# Patient Record
Sex: MALE
[De-identification: names, ages, dates, MRNs, and addresses within clinical notes are randomized per-mention and may not be internally consistent; named-entity substitution may affect disease eponyms.]

---

## 2022-08-02 ENCOUNTER — HOSPITAL ENCOUNTER (EMERGENCY)
Dept: HOSPITAL 97 - ER | Age: 52
Discharge: HOME | End: 2022-08-02
Payer: SELF-PAY

## 2022-08-02 VITALS — OXYGEN SATURATION: 100 %

## 2022-08-02 VITALS — DIASTOLIC BLOOD PRESSURE: 74 MMHG | SYSTOLIC BLOOD PRESSURE: 138 MMHG

## 2022-08-02 VITALS — TEMPERATURE: 98.6 F

## 2022-08-02 DIAGNOSIS — K72.00: Primary | ICD-10-CM

## 2022-08-02 DIAGNOSIS — R79.1: ICD-10-CM

## 2022-08-02 DIAGNOSIS — K70.31: ICD-10-CM

## 2022-08-02 DIAGNOSIS — R60.9: ICD-10-CM

## 2022-08-02 DIAGNOSIS — Z20.822: ICD-10-CM

## 2022-08-02 LAB
ALBUMIN SERPL BCP-MCNC: 1.6 G/DL (ref 3.4–5)
ALP SERPL-CCNC: 90 U/L (ref 45–117)
ALT SERPL W P-5'-P-CCNC: 30 U/L (ref 12–78)
ANISOCYTOSIS BLD QL: (no result)
AST SERPL W P-5'-P-CCNC: 51 U/L (ref 15–37)
BLD SMEAR INTERP: (no result)
BUN BLD-MCNC: 5 MG/DL (ref 7–18)
GLUCOSE SERPLBLD-MCNC: 95 MG/DL (ref 74–106)
HCT VFR BLD CALC: 31 % (ref 39.6–49)
INR BLD: 2.69
LYMPHOCYTES # SPEC AUTO: 0.6 K/UL (ref 0.7–4.9)
MACROCYTES BLD QL: (no result)
MAGNESIUM SERPL-MCNC: 2 MG/DL (ref 1.8–2.4)
MCV RBC: 111 FL (ref 80–100)
MORPHOLOGY BLD-IMP: (no result)
NT-PROBNP SERPL-MCNC: 334 PG/ML (ref ?–125)
PMV BLD: 7.8 FL (ref 7.6–11.3)
POTASSIUM SERPL-SCNC: 3.6 MMOL/L (ref 3.5–5.1)
RBC # BLD: 2.8 M/UL (ref 4.33–5.43)
TROPONIN I SERPL HS-MCNC: 4.9 PG/ML (ref ?–58.9)

## 2022-08-02 PROCEDURE — 83735 ASSAY OF MAGNESIUM: CPT

## 2022-08-02 PROCEDURE — 93005 ELECTROCARDIOGRAM TRACING: CPT

## 2022-08-02 PROCEDURE — 83880 ASSAY OF NATRIURETIC PEPTIDE: CPT

## 2022-08-02 PROCEDURE — 84484 ASSAY OF TROPONIN QUANT: CPT

## 2022-08-02 PROCEDURE — 99284 EMERGENCY DEPT VISIT MOD MDM: CPT

## 2022-08-02 PROCEDURE — 85610 PROTHROMBIN TIME: CPT

## 2022-08-02 PROCEDURE — 96372 THER/PROPH/DIAG INJ SC/IM: CPT

## 2022-08-02 PROCEDURE — 85025 COMPLETE CBC W/AUTO DIFF WBC: CPT

## 2022-08-02 PROCEDURE — 80048 BASIC METABOLIC PNL TOTAL CA: CPT

## 2022-08-02 PROCEDURE — 71045 X-RAY EXAM CHEST 1 VIEW: CPT

## 2022-08-02 PROCEDURE — 86850 RBC ANTIBODY SCREEN: CPT

## 2022-08-02 PROCEDURE — 36415 COLL VENOUS BLD VENIPUNCTURE: CPT

## 2022-08-02 PROCEDURE — 96365 THER/PROPH/DIAG IV INF INIT: CPT

## 2022-08-02 PROCEDURE — 86901 BLOOD TYPING SEROLOGIC RH(D): CPT

## 2022-08-02 PROCEDURE — 96375 TX/PRO/DX INJ NEW DRUG ADDON: CPT

## 2022-08-02 PROCEDURE — 86900 BLOOD TYPING SEROLOGIC ABO: CPT

## 2022-08-02 PROCEDURE — 87811 SARS-COV-2 COVID19 W/OPTIC: CPT

## 2022-08-02 PROCEDURE — 82140 ASSAY OF AMMONIA: CPT

## 2022-08-02 PROCEDURE — 80076 HEPATIC FUNCTION PANEL: CPT

## 2022-08-02 NOTE — XMS REPORT
Continuity of Care Document

                            Created on:2022



Patient:MEHUL CESAR

Sex:Male

:1970

External Reference #:[id number]





Demographics







                          Address                   [address] 



                                                    [place] [address]

 

                          Home Phone                [phone]

 

                          Mobile Phone              [phone]

 

                          Email Address             [email or web address]

 

                          Preferred Language        English

 

                          Marital Status            Unknown

 

                          [employer]ist Affiliation     Unknown

 

                          Race                      Unknown

 

                          Additional Race(s)        Unavailable

 

                          Ethnic Group              Unknown









Author







                          Organization              [facility]

t

 

                          Address                   [address]



                                                    [place] [address]

 

                          Phone                     [phone]









Support







                Name            Relationship    Address         Phone

 

                MIGUEL ANGEL CESAR               [address]available



                                                [address]             



                                                [place] [address] 









Care Team Providers







                    Name                Role                Phone

 

                    OWENRADHAJERRODWILLIS PASCAL Primary Care Physician Unavailable

 

                    JONNA MAHMOOD    Attending Clinician Unavailable

 

                    MARGE HOPKINS   Attending Clinician Unavailable

 

                    ENA KNOX      Attending Clinician Unavailable

 

                    MELINDA MEDINA  Attending Clinician Unavailable

 

                    ESTRELLITA POSEY  Attending Clinician Unavailable

 

                    MACEY OLIVAREZ Attending Clinician Unavailable

 

                    JONNA MAHMOOD    Admitting Clinician Unavailable

 

                    MARGE HOPKINS   Admitting Clinician Unavailable

 

                    ENA KNOX      Admitting Clinician Unavailable

 

                    MACEY OLIVAREZ Admitting Clinician Unavailable









Payers







           Payer Name Policy Type Policy Number Effective Date Expiration Date LAURENCE HERNANDEZ OPEN ACCESS            [id number]  2013 



           O NAP                          00:00:00   00:00:00   

 

           [facility]            [id number] 2019            



                                            00:00:00              







Problems

This patient has no known problems.



Allergies, Adverse Reactions, Alerts







       Allergy Allergy Status Severity Reaction(s) Onset  Inactive Treating Comm

ents 

Source



       Name   Type                        Date   Date   Clinician        

 

       NO KNOWN Allergy Active                                           [facility]



       ALLERGIE                                                         



       S                                                              







Medications

This patient has no known medications.



Vital Signs







             Vital Name   Observation Time Observation Value Comments     Source

 

             HEIGHT       2021 15:18:00 172.7 cm                  

 

             WEIGHT       2021 15:18:00 81.194 kg                 

 

             HEIGHT       2021 15:18:00 172.7 cm                  

 

             WEIGHT       2021 15:18:00 81.194 kg                 

 

             HEIGHT       2021 11:00:00 172.7 cm                  

 

             WEIGHT       2021 11:00:00 81.194 kg                 

 

             HEIGHT       2021 11:00:00 172.7 cm                  

 

             WEIGHT       2021 11:00:00 81.194 kg                 







Procedures

This patient has no known procedures.



Encounters







        Start   End     Encounter Admission Attending Care    Care    Encounter 

Source



        Date/Time Date/Time Type    Type    Clinicians Facility Department ID   

   

 

        2021-10-09         Outpatient         TIMOTEO [facility]    Surgery [id number]

 [facility]



        01:00:26                         JONNA                          

 

        2021         Inpatient UR      NALAM  [facility]    Internal [id number]

 [facility]



        14:51:00                         MARGE           Med             

 

        2021 Outpatient HAYES MEDINA [facility]    [id number]

[id number] [facility]



        00:00:00 00:00:00                 RISE                            

 

        2021 Outpatient [facility]    [id number]

[id number] [facility]



        00:00:00 00:00:00                                                 

 

        2021 Outpatient       ABILIO ENA [facility]    Radiology 2

[id number] [facility]



        06:16:51 09:51:00                                                 

 

        2021 Outpatient [facility]    [id number]

[id number] [facility]



        00:00:00 00:00:00                                                 

 

        2021 Outpatient         [facility]    [id number]

023 [facility]



        00:00:00 00:00:00                 ESTRELLITA                          

 

        2021 Outpatient       KALPESH   [facility]    [id number]

022 [facility]



        00:00:00 00:00:00                 ESTRELLITA                          

 

        2021 Outpatient                 [facility]    [id number]

[id number] [facility]



        00:00:00 00:00:00                                                 

 

        2019-12-10 2019-12-10 Outpatient [facility]    [id number]

253 [facility]



        00:00:00 00:00:00                                                 







Results







           Test Description Test Time  Test Comments Results    Result Comments 

Source









                    HEPATIC FUNCTION PANEL 2022 01:12:10 









                      Test Item  Value      Reference Range Interpretation Comme

nts









             PROTEIN, TOTAL (test code = 7.9 G/DL     6.1-8.3                   



             )                                               

 

             ALBUMIN (test code = 2201) 2.0 G/DL     3.5-5.2      L            

 

             BILIRUBIN, TOTAL (test code = 11.3 MG/DL   See_Comment  H          

   [Automated message] The



             )                                               system which ge

nerated this



                                                                 result transmit

susi reference



                                                                 range: <=1.2. T

he reference



                                                                 range was not u

sed to



                                                                 interpret this 

result as



                                                                 normal/abnormal

.

 

             BILIRUBIN, DIRECT (test code = 3.6 MG/DL    0.0-0.3      H         

   



             )                                               

 

             ALKALINE PHOSPHATASE (test 101 U/L                          



             code = 2204)                                        

 

             AST (test code = 2218) 49 U/L       9-50                      

 

             ALT (test code = 2219) 25 U/L       5-50                      



CBC W/AUTO DIFF WITH [id number] 04:37:27





             Test Item    Value        Reference Range Interpretation Comments

 

             WBC (test code = 4.8 K/UL     3.5-11.0                  



             1001)                                               

 

             RBC (test code = 3.14 M/UL    4.50-6.10    L            



             1002)                                               

 

             HEMOGLOBIN (test 11.7 G/DL    13.5-17.0    L            



             code = 1003)                                        

 

             HEMATOCRIT (test 32.9 %       40.0-51.0    L            



             code = 1004)                                        

 

             MCV (test code = 104.8 fL     80.0-99.0    H            



             1005)                                               

 

             MCH (test code = 37.3 PG      25.0-33.0    H            



             1006)                                               

 

             MCHC (test code = 35.6 G/DL    31.0-36.0                 



             1007)                                               

 

             RDW (test code = 12.8 %       11.5-15.0                 



             1038)                                               

 

             NEUTROPHILS (test 58.4 %                                  AUTOMATED



             code = 1008)                                        DIFFERENTIAL



                                                                 CONFIRMED  WITH



                                                                 MANUAL SLIDE RE

VIEW.

 

             LYMPHOCYTES (test 18.6 %                                 



             code = 1010)                                        

 

             MONOCYTES (test code 15.9 %                                 



             = 1011)                                             

 

             EOSINOPHILS (test 5.0 %                                  



             code = 1012)                                        

 

             BASOPHILS (test code 1.3 %                                  



             = 1013)                                             

 

             IMMATURE     0.8 %                                  



             GRANULOCYTES (test                                        



             code = 1036)                                        

 

             NUCLEATED RBCS (test 0.0 /100     See_Comment                [Autom

ated message]



             code = 1065) WBC'S                                  The system whic

h



                                                                 generated this 

result



                                                                 transmitted ref

erence



                                                                 range: 0.0. The



                                                                 reference range

 was



                                                                 not used to int

erpret



                                                                 this result as



                                                                 normal/abnormal

.

 

             PLATELET COUNT (test 92 K/UL      130-400      L            



             code = 1015)                                        

 

             ABSOLUTE NEUTROPHILS 2.80 K/UL    1.50-7.50                 



             (test code = 1066)                                        

 

             ABSOLUTE LYMPHOCYTES 0.89 K/UL    1.00-4.00    L            



             (test code = 1067)                                        

 

             ABSOLUTE MONOCYTES 0.76 K/UL    0.20-1.00                 



             (test code = 1068)                                        

 

             ABSOLUTE EOSINOPHILS 0.24 K/UL    0.00-0.50                 



             (test code = 1040)                                        

 

             ABSOLUTE BASOPHILS 0.06 K/UL    0.00-0.20                 



             (test code = 1069)                                        

 

             ABS IMMATURE 0.04 K/UL    0.00-0.10                 



             GRANULOCYTES (test                                        



             code = 1020)                                        

 

             ABS NUCLEATED RBCS 0.02 K/UL    0.00-0.11                 



             (test code = 45344)                                        

 

             COMMENTS (test code (NOTE)                                  SLIGHT 

MACROCYTOSIS



             = 1016)                                             SEE ADDITIONAL



                                                                 COMMENTS BELOW:



                                                                 PLATELET CLUMPI

NG



                                                                 PRESENT; TRUE



                                                                 PLATELET COUNT 

MAY BE



                                                                 HIGHER. IF CLIN

ICALLY



                                                                 INDICATED, ORDE

R



                                                                 REPEAT PLATELET

 COUNT



                                                                 WITH CITRATE



                                                                 ANTICOAGULATED 

TUBE



                                                                 (CPL ORDER CODE



                                                                 1047). UNLESS



                                                                 OTHERWISE INDIC

ATED,



                                                                 ALL TESTING PER

FORMED



                                                                 ATCLINICAL PATH

[employer] (now [employer]), [facility].



                                                                 [address] [place], [place] [address] [place] [address]



                                                                 DIRECTOR: BETH MESA M.D.

 CLIA



                                                                 NUMBER [id number]

[id number] CAP



                                                                 ACCREDITATION N

O.



                                                                 [id number]



MISCELLANEOUS LAB [id number] 13:01:00





             Test Item    Value        Reference Range Interpretation Comments

 

             SCAN RESULT (test code = 8651034)                                  

      



BASIC METABOLIC [id number] 14:32:00





             Test Item    Value        Reference Range Interpretation Comments

 

             SODIUM (BEAKER) 138 meq/L    136-145                   



             (test code = 381)                                        

 

             POTASSIUM (BEAKER) 3.8 meq/L    3.5-5.1                   



             (test code = 379)                                        

 

             CHLORIDE (BEAKER) 107 meq/L                        



             (test code = 382)                                        

 

             CO2 (BEAKER) (test 24 meq/L     22-29                     



             code = 355)                                         

 

             BLOOD UREA NITROGEN 4 mg/dL      7-21         L            



             (BEAKER) (test code                                        



             = 354)                                              

 

             CREATININE (BEAKER) 0.83 mg/dL   0.57-1.25                 



             (test code = 358)                                        

 

             GLUCOSE RANDOM 60 mg/dL            L            



             (BEAKER) (test code                                        



             = 652)                                              

 

             CALCIUM (BEAKER) 8.6 mg/dL    8.4-10.2                  



             (test code = 697)                                        

 

             EGFR (BEAKER) (test 98 mL/min/1.73                           ESTIMA

SUSI GFR IS



             code = 1092) sq m                                   NOT AS ACCURATE

 AS



                                                                 CREATININE



                                                                 CLEARANCE IN



                                                                 PREDICTING



                                                                 GLOMERULAR



                                                                 FILTRATION RATE

.



                                                                 ESTIMATED GFR I

S



                                                                 NOT APPLICABLE 

FOR



                                                                 DIALYSIS PATIEN

TS.



 ID - RHONDA Pearceecimen moderately ictericHEPATIC FUNCTION [id number] 
14:32:00





             Test Item    Value        Reference Range Interpretation Comments

 

             TOTAL PROTEIN (BEAKER) (test code = 7.8 gm/dL    6.0-8.3           

        



             770)                                                

 

             ALBUMIN (BEAKER) (test code = 1145) 2.5 g/dL     3.5-5.0      L    

        

 

             BILIRUBIN TOTAL (BEAKER) (test code 5.8 mg/dL    0.2-1.2      H    

        



             = 377)                                              

 

             BILIRUBIN DIRECT (BEAKER) (test 2.5 mg/dL    0.1-0.5      H        

    



             code = 706)                                         

 

             ALKALINE PHOSPHATASE (BEAKER) (test 163 U/L             H    

        



             code = 346)                                         

 

             AST (SGOT) (BEAKER) (test code = 47 U/L       5-34         H       

     



             353)                                                

 

             ALT (SGPT) (BEAKER) (test code = 18 U/L       6-55                 

     



             347)                                                



 ID - RHONDA Mckeonimedolly moderately ictericPROTHROMBIN TIME/[id number] 
14:23:00





             Test Item    Value        Reference Range Interpretation Comments

 

             PROTIME (BEAKER) 21.9 seconds 11.9-14.2    H            



             (test code = 759)                                        

 

             INR (BEAKER) (test 1.97         See_Comment                [Automat

ed message]



             code = 370)                                         The system [employer]



                                                                 generated this 

result



                                                                 transmitted ref

erence



                                                                 range: <=5.90. 

The



                                                                 reference range

 was



                                                                 not used to int

erpret



                                                                 this result as



                                                                 normal/abnormal

.



Effective 2019: PT Reference Range ChangeNew: 11.9-14.2 Previous: 11.7-
14.7RECOMMENDED COUMADIN/WARFARIN INR THERAPY RANGESSTANDARD DOSE: 2.0-3.0 
Includes: PROPHYLAXIS for venous thrombosis, systemic embolization; TREATMENT 
for venous thrombosis and/or pulmonary embolus.HIGH RISK: Target INR is 2.5-3.5 
for patients wiht mechanical heart valves.CBC W/PLT COUNT &amp; AUTO 
[id number] 14:21:00





             Test Item    Value        Reference Range Interpretation Comments

 

             WHITE BLOOD CELL COUNT (BEAKER) 4.5 K/ L     3.5-10.5              

    



             (test code = 775)                                        

 

             RED BLOOD CELL COUNT (BEAKER) 3.70 M/ L    4.63-6.08    L          

  



             (test code = 761)                                        

 

             HEMOGLOBIN (BEAKER) (test code = 12.5 GM/DL   13.7-17.5    L       

     



             410)                                                

 

             HEMATOCRIT (BEAKER) (test code = 37.4 %       40.1-51.0    L       

     



             411)                                                

 

             MEAN CORPUSCULAR VOLUME (BEAKER) 101.1 fL     79.0-92.2    H       

     



             (test code = 753)                                        

 

             MEAN CORPUSCULAR HEMOGLOBIN 33.8 pg      25.7-32.2    H            



             (BEAKER) (test code = 751)                                        

 

             MEAN CORPUSCULAR HEMOGLOBIN CONC 33.4 GM/DL   32.3-36.5            

     



             (BEAKER) (test code = 752)                                        

 

             RED CELL DISTRIBUTION WIDTH 14.9 %       11.6-14.4    H            



             (BEAKER) (test code = 412)                                        

 

             PLATELET COUNT (BEAKER) (test 124 K/CU MM  150-450      L          

  



             code = 756)                                         

 

             MEAN PLATELET VOLUME (BEAKER) 11.8 fL      9.4-12.4                

  



             (test code = 754)                                        

 

             NUCLEATED RED BLOOD CELLS 0 /100 WBC   0-0                       



             (BEAKER) (test code = 413)                                        

 

             NEUTROPHILS RELATIVE PERCENT 43 %                                  

 



             (BEAKER) (test code = 429)                                        

 

             LYMPHOCYTES RELATIVE PERCENT 34 %                                  

 



             (BEAKER) (test code = 430)                                        

 

             MONOCYTES RELATIVE PERCENT 12 %                                   



             (BEAKER) (test code = 431)                                        

 

             EOSINOPHILS RELATIVE PERCENT 10 %                                  

 



             (BEAKER) (test code = 432)                                        

 

             BASOPHILS RELATIVE PERCENT 1 %                                    



             (BEAKER) (test code = 437)                                        

 

             NEUTROPHILS ABSOLUTE COUNT 1.92 K/ L    1.78-5.38                 



             (BEAKER) (test code = 670)                                        

 

             LYMPHOCYTES ABSOLUTE COUNT 1.55 K/ L    1.32-3.57                 



             (BEAKER) (test code = 414)                                        

 

             MONOCYTES ABSOLUTE COUNT (BEAKER) 0.55 K/ L    0.30-0.82           

      



             (test code = 415)                                        

 

             EOSINOPHILS ABSOLUTE COUNT 0.45 K/ L    0.04-0.54                 



             (BEAKER) (test code = 416)                                        

 

             BASOPHILS ABSOLUTE COUNT (BEAKER) 0.05 K/ L    0.01-0.08           

      



             (test code = 417)                                        

 

             IMMATURE GRANULOCYTES-RELATIVE 0 %          0-1                    

   



             PERCENT (BEAKER) (test code =                                      

  



             2801)                                               



MISCELLANEOUS LAB [id number] 10:46:00





             Test Item    Value        Reference Range Interpretation Comments

 

             SCAN RESULT (test code = 7945863)                                  

      



BASIC METABOLIC [id number] 12:16:00





             Test Item    Value        Reference Range Interpretation Comments

 

             SODIUM (BEAKER) 135 meq/L    136-145      L            



             (test code = 381)                                        

 

             POTASSIUM (BEAKER) 3.9 meq/L    3.5-5.1                   



             (test code = 379)                                        

 

             CHLORIDE (BEAKER) 107 meq/L                        



             (test code = 382)                                        

 

             CO2 (BEAKER) (test 25 meq/L     22-29                     



             code = 355)                                         

 

             BLOOD UREA NITROGEN 6 mg/dL      7-21         L            



             (BEAKER) (test code                                        



             = 354)                                              

 

             CREATININE (BEAKER) 0.77 mg/dL   0.57-1.25                 



             (test code = 358)                                        

 

             GLUCOSE RANDOM 83 mg/dL                         



             (BEAKER) (test code                                        



             = 652)                                              

 

             CALCIUM (BEAKER) 7.8 mg/dL    8.4-10.2     L            



             (test code = 697)                                        

 

             EGFR (BEAKER) (test 107 mL/min/1.73                           ESTIM

ATED GFR IS



             code = 1092) sq m                                   NOT AS ACCURATE

 AS



                                                                 CREATININE



                                                                 CLEARANCE IN



                                                                 PREDICTING



                                                                 GLOMERULAR



                                                                 FILTRATION RATE

.



                                                                 ESTIMATED GFR I

S



                                                                 NOT APPLICABLE 

FOR



                                                                 DIALYSIS PATIEN

TS.



 ID Dre TOPETE FSpecimen moderately ictericHEPATIC FUNCTION PANEL
2021 12:07:00





             Test Item    Value        Reference Range Interpretation Comments

 

             TOTAL PROTEIN (BEAKER) (test code = 6.6 gm/dL    6.0-8.3           

        



             770)                                                

 

             ALBUMIN (BEAKER) (test code = 1145) 2.3 g/dL     3.5-5.0      L    

        

 

             BILIRUBIN TOTAL (BEAKER) (test code 5.9 mg/dL    0.2-1.2      H    

        



             = 377)                                              

 

             BILIRUBIN DIRECT (BEAKER) (test 2.2 mg/dL    0.1-0.5      H        

    



             code = 706)                                         

 

             ALKALINE PHOSPHATASE (BEAKER) (test 109 U/L                  

        



             code = 346)                                         

 

             AST (SGOT) (BEAKER) (test code = 54 U/L       5-34         H       

     



             353)                                                

 

             ALT (SGPT) (BEAKER) (test code = 24 U/L       6-55                 

     



             347)                                                



 ID Dre TOPETE FSpecimen moderately ictericPROTHROMBIN TIME/INR
2021 12:01:00





             Test Item    Value        Reference Range Interpretation Comments

 

             PROTIME (BEAKER) 24.8 seconds 11.9-14.2    H            



             (test code = 759)                                        

 

             INR (BEAKER) (test 2.31         See_Comment                [Automat

ed message]



             code = 370)                                         The system [employer]



                                                                 generated this 

result



                                                                 transmitted ref

erence



                                                                 range: <=5.90. 

The



                                                                 reference range

 was



                                                                 not used to int

erpret



                                                                 this result as



                                                                 normal/abnormal

.



Effective 2019: PT Reference Range ChangeNew: 11.9-14.2 Previous: 11.7-
14.7RECOMMENDED COUMADIN/WARFARIN INR THERAPY RANGESSTANDARD DOSE: 2.0-3.0 
Includes: PROPHYLAXIS for venous thrombosis, systemic embolization; TREATMENT 
for venous thrombosis and/or pulmonary embolus.HIGH RISK: Target INR is 2.5-3.5 
for patients wiht mechanical heart valves.CBC W/PLT COUNT &amp; AUTO 
[id number] 11:44:00





             Test Item    Value        Reference Range Interpretation Comments

 

             WHITE BLOOD CELL COUNT (BEAKER) 3.7 K/ L     3.5-10.5              

    



             (test code = 775)                                        

 

             RED BLOOD CELL COUNT (BEAKER) 3.30 M/ L    4.63-6.08    L          

  



             (test code = 761)                                        

 

             HEMOGLOBIN (BEAKER) (test code = 11.6 GM/DL   13.7-17.5    L       

     



             410)                                                

 

             HEMATOCRIT (BEAKER) (test code = 34.8 %       40.1-51.0    L       

     



             411)                                                

 

             MEAN CORPUSCULAR VOLUME (BEAKER) 105.5 fL     79.0-92.2    H       

     



             (test code = 753)                                        

 

             MEAN CORPUSCULAR HEMOGLOBIN 35.2 pg      25.7-32.2    H            



             (BEAKER) (test code = 751)                                        

 

             MEAN CORPUSCULAR HEMOGLOBIN CONC 33.3 GM/DL   32.3-36.5            

     



             (BEAKER) (test code = 752)                                        

 

             RED CELL DISTRIBUTION WIDTH 13.9 %       11.6-14.4                 



             (BEAKER) (test code = 412)                                        

 

             PLATELET COUNT (BEAKER) (test code 83 K/CU MM   150-450      L     

       



             = 756)                                              

 

             MEAN PLATELET VOLUME (BEAKER) 12.1 fL      9.4-12.4                

  



             (test code = 754)                                        

 

             NUCLEATED RED BLOOD CELLS (BEAKER) 0 /100 WBC   0-0                

       



             (test code = 413)                                        

 

             NEUTROPHILS RELATIVE PERCENT 46 %                                  

 



             (BEAKER) (test code = 429)                                        

 

             LYMPHOCYTES RELATIVE PERCENT 34 %                                  

 



             (BEAKER) (test code = 430)                                        

 

             MONOCYTES RELATIVE PERCENT 14 %                                   



             (BEAKER) (test code = 431)                                        

 

             EOSINOPHILS RELATIVE PERCENT 5 %                                   

 



             (BEAKER) (test code = 432)                                        

 

             BASOPHILS RELATIVE PERCENT 1 %                                    



             (BEAKER) (test code = 437)                                        

 

             NEUTROPHILS ABSOLUTE COUNT 1.69 K/ L    1.78-5.38    L            



             (BEAKER) (test code = 670)                                        

 

             LYMPHOCYTES ABSOLUTE COUNT 1.24 K/ L    1.32-3.57    L            



             (BEAKER) (test code = 414)                                        

 

             MONOCYTES ABSOLUTE COUNT (BEAKER) 0.53 K/ L    0.30-0.82           

      



             (test code = 415)                                        

 

             EOSINOPHILS ABSOLUTE COUNT 0.20 K/ L    0.04-0.54                 



             (BEAKER) (test code = 416)                                        

 

             BASOPHILS ABSOLUTE COUNT (BEAKER) 0.03 K/ L    0.01-0.08           

      



             (test code = 417)                                        

 

             IMMATURE GRANULOCYTES-RELATIVE 0 %          0-1                    

   



             PERCENT (BEAKER) (test code =                                      

  



             2801)                                               



U/S, [id number] 23:08:00Referring: Dr. Qiana Liu Administer 
200 mL of albumin 25% (50 grams) IV x1 after paracentesis if 3 or more liters 
removed. Send ascitic fluid for cell count and differential. Administer 200 mL 
ofalbumin 25% (50 grams) IV x1 after paracentesis if 3 or more liters removed. 
Send ascitic fluid for cell count and differential. Labs to be ordered:-
&gt;Other (please add comment) Reason for Exam:-&gt;ascites, portal 
hypertension, cirrhosis
************************************************************[facility] 1970 Sex: 
M************************************************************FINAL REPORT 
PATIENT ID: [id number] Ultrasound guided paracentesis. Clinical History: Ascites. 
Sedation: None. : Denisa Tellez PA-C Assistant: None. Estimated
Blood Loss: &lt; 1 cc. Specimen: 4200 cc of clear yellow fluid, samples sent to 
laboratory. Technique: Informed consent was obtained. The risks of pain, 
bleeding, infection, bowel perforation, injury to adjacent structures, and adv
erse medication reactions were discussed with the patient. After informed 
consent was obtained, the patient's abdomen was scanned. The right upper 
quadrant of the abdomen was selected for paracentesis.After the largest fluid 
pocket area was marked, and the anterior abdominal wall was evaluated with color
Doppler to exclude presence of blood vessels traversing the area, the skin was 
prepped and draped in the usual sterile manner. After local anesthesia was 
achieved with 2% lidocaine, a 5 Argentine one-step catheter was advanced into the 
peritoneal cavity under ultrasound guidance. After completion of drainage, the 
catheter was removed. There was no evidence of complication. 
Impression:Successful ultrasound guided paracentesis. Signed: Latasha Santos 
MDReport Verified Date/Time: 2021 23:08:28 Reading Location: [facility] B1 P006J 
Ultrasound Reading Room Electronically signed by: LATASHA SANTOS MD on 
2021 11:08 PMSARS-COV2/RT-PCR ([facility] [place] &amp; REF LABS)2021 20:09:00





             Test Item    Value        Reference Range Interpretation Comments

 

             SARS-COV2/RT-PCR (test Negative     Not Detected, Negative,        

      



             code = 3426907)              See external report for              



                                       linked test               

 

             SARS-COV-2 PERFORMING LAB [facility]                             



             (test code = 7161064)                                        



Negative result for this test determines that SARS-CoV-2 RNA was not present in 
the specimen above the Limit of Detection (LOD). However, Negative results do 
not preclude SARS-CoV-2 infection and should not be used as the sole basis for 
treatment or patient management decisions. Negative results must be combined 
with clinical observations, patient history, and epidemiological information. A 
false negative result may occur if a specimen is improperly collected, 
transported or handled. A false negative result should be considered if 
patient's recent exposures or clinical presentation indicate that COVID-19 
(SARS-CoV-2) is likely and diagnostic tests for other causes of illness are 
negative. Re-testing should be considered in cases of suspected false 
negatives.The limit of detection for this assay is 800 copies/mL.This SARS CoV-2
test is a real-time RT-PCR test intended for the qualitative detection of 
nucleic acid from SARS-CoV-2 in a nasopharyngeal swab specimen collected from 
individuals suspected of COVID-19 by their healthcare provider.This test has not
been Food and Drug Administration (FDA) cleared or approved. This is a modified 
version of an approved Emergency Use Authorization (EUA) and is in the process 
of review by the FDA. Once authorized by the FDA, the issued EUA will be 
effective until the declaration that circumstances exist justifying the 
authorization of the emergency use ofin vitro diagnostic tests for detection 
and/or diagnosis of COVID-19 is terminated under Section 564(b)(2) of the Act or
the EUA is revoked under Section 564(g) of the Act.Fact Sheet for Healthcare 
Prov
iders:https://www.[employer]/sites/default/files/product/documents/Fact_Sheet_HC

_Xrnzbfwpu_Oimg_POKI-ZpW-1.pdfFact Sheet for Healthcare 
Patients:https://www.[employer]/sites/default/files/product/docume
nts/Anzi_Xzncl_Avkpauys_Byqv_PBZU-ZsK-6.pdfPerforming Laboratory:[facility].[place], [place] [address]WNUG FLUID CELL COUNT 
WITH [id number] 11:53:00





             Test Item    Value        Reference Range Interpretation Comments

 

             APPEARANCE FLUID (BEAKER) (test Hazy         Clear        A        

    



             code = 510)                                         

 

             COLOR FLUID (BEAKER) (test code Yellow       Colorless, Straw A    

        



             = 511)                                              

 

             RBC FLUID (BEAKER) (test code = 490 /cu mm   <=1          H        

    



             513)                                                

 

             ADJUSTED WBC FLUID (BEAKER) 474 /cu mm   <=5          H            



             (test code = 1691)                                        

 

             LINING CELLS (BEAKER) (test 28 /cu mm    <=1          H            



             code = 1590)                                        

 

             NEUTROPHILS FLUID (BEAKER) 10 %                                   



             (test code = 1656)                                        

 

             LYMPHS FLUID (BEAKER) (test 55 %                                   



             code = 488)                                         

 

             MONO/MACROPHAGE FLUID (BEAKER) 35 %                                

   



             (test code = 489)                                        

 

             EOSINOPHILS FLUID (BEAKER) 0 %                                    



             (test code = 491)                                        

 

             BASO FLUID (BEAKER) (test code 0 %                                 

   



             = 492)                                              

 

             CONTAINER BODY FLUID (BEAKER) Sterile Vial                         

  



             (test code = 2873)                                        



BLOOD [id number] 21:00:00





             Test Item    Value        Reference Range Interpretation Comments

 

             CULTURE (BEAKER) (test No growth in 5 days                         

  



             code = 1095)                                        



BLOOD [id number] 21:00:00





             Test Item    Value        Reference Range Interpretation Comments

 

             CULTURE (BEAKER) (test No growth in 5 days                         

  



             code = 1095)                                        



TISSUE [id number] 18:03:00Surgical Pathology Report  Case: [id number] 
Authorizing Provider: Cathryn Frankel MD Collected: 2021 08:47 AM 
Ordering Location: [address]  Received: 2021 08:23 AM Service
Pathologist: Geronimo Garrison MD  Specimen: Polyp, Colon - Left/Descending, x3 
COLON, LEFT/DESCENDING COLON POLYPS X 3, POLYPECTOMY- TUBULAR ADENOMA, FRAGMENTS
OF- HIGH GRADE DYSPLASIA OR CARCINOMA NOT SEEN Signing Pathologist Direct Phone 
Line: [phone] signed by Geronimo Garrison MD on2021 at 
6:03 [id number] CANCER SCREENINGDescending colonReceived in formalin 
labeled thepatient's name, accession number and "descending colon polyp" are 3 
tan-pink tissue fragments measuring up to 0.2 cm in greatest dimension which are
filtered and submitted in toto in A1.RAISSA Ha, HT 
(ASCP)Performed.[facility], Department of Pathology, [address] [place] [address], Tel [phone] [facility], Department of Pathology, [address] [place] [address], Tel 
[phone] Regional Medical Center, Department of Pathology, [address] [place] [address], Tel [phone], CTA CORONARY WITH CALCIUM
EVAL AND FFR [id number] 15:35:00Referring: Dr. Qiana Liu
************************************************************[facility] : 1970 Sex: 
M************************************************************Addendum 
BeginsREPORT STATUS:A PATIENT ID: [id number] I agree with the nonvascular findings
with exceptions and emphasis as below:*Cirrhotic liver with a TIPS and small 
volume upper abdominal ascites.*Trace bilateral pleural effusions with 
subsegmental atelectasis. Signed: Carey Aaron MDReport Verified Date/Time: 
2021 15:35:10 Addendum EndsFINAL REPORT PATIENT ID: [id number] CTA Aorta - 
chest with coronary calcium score and coronary CTA: 2021 11:01 PM. 
Comparison: CT chest dated 2021. Clinical History: 51 years old Male now 
referred for evaluation of coronary calcium score and for evaluation of coronary
artery anatomy and possible stenoses. Study was performed in part in order to 
avoid an invasive procedure.. Technique: Multidetector CT scanner. Images were 
obtained before and during the dynamic passage of intravenous contrast material 
with retrospective ECG gating. 3D volume-renderingand multiplanar 
reconstructions were performed interactively by the interpreting physician using
an independent ([employer]) workstation. Please refer to the contrast sheet scanned 
in the EPIC system for the amount and route of contrast given. This exam was 
performed according to our departmental dose-optimisation programme, which 
includes automated exposure control, adjustment of the mA and/or kV according to
patient size and/or use of iterative reconstruction technique. Dose modulation, 
iterative reconstruction, and/or weight based adjustment of the mA/kV was 
utilized to reduce the radiation dose to as low as reasonably achievable. 
Findings: The visualized thyroid gland appears normal. The chest wall, 
mediastinum, and pericardium are unremarkable. The pulmonary arteries appear 
normal. No significant adenopathy is identified in the axilla, mediastinum, and 
shani. Lung windows reveal no acute abnormalities, except for mild bibasilar 
atelectasis. There is no pulmonary parenchymal mass, infiltrate, orpleural 
effusion. The cardiac chambers demonstrate normal atrioventricular and 
ventriculoarterial concordance, and systemic and pulmonary venous return. The 
cardiac chamber sizes are notable for mild biatrial enlargement. The aortic 
valve is trileaflet, and free from calcifications. The ascending thoracic aorta 
and aortic arch is normal in course, caliber, and contour. The arch vessel 
branching pattern is normal. The imaged arch branch vessels are patent 
proximally. There is no acute aortic pathology, such as dissection, intramural 
hematoma, or contained rupture. Calcium score and high-resolution, ECG 
synchronized computed tomography of the heart with attention to the coronary 
arteries was performed. Coronary calcification was analyzed using the [employer] 
system software. These are the results of the calcificate evaluation (threshold 
= 130 HU): LM: Agatston = 0Volume = 0LAD:Agatston = 182Volume = 151LCX:Agatston 
= 40Volume = 48RCA:Agatston = 278Volume = 
271******************************************
**********************************Total:Agatston = 500Volume = 470 Reference 
ranges for calcium scores are provided as follows ([facility] Clin Proc 1999; 74: 243-
252): 0-10: minimal fvqltyo17-995:mild limbwpt028-476ybeaujaf calcium&gt; 
400significant calcium This calcium score places the patient is &gt; 95th 
percentile for age group. Coronary CT angiogram was performed using standard 
methodology and images analyzed using [employer] software package. CORONARY ANATOMY:
Origins: Normal coronary artery origins. Left Main Coronary Artery: The LM is a 
normal sized vessel that bifurcates into the LAD and LCx. There is no 
significant atherosclerotic change or stenotic disease. Left Anterior Descending
Coronary Artery: The LAD is a normal sized vessel that wraps around the apex. It
gives rise to 2 acute diagonal branches. There is mild to moderate eccentric 
calcification involving the proximal to mid LAD and focal calcification at the 
distal LAD. There is mild (25 -49%)luminal stenosis involving the ostium of the 
LAD with mixed calcific atherosclerotic changes, otherwise the remaining LAD is 
widely patent with minimal luminal irregularities. Left Circumflex Coronary 
Artery: The LCX is a normal sized vessel, which is non-dominant. It gives rise 
to 2 obtuse marginal branches. There is mild focal eccentric calcification at 
the mid circumflex, otherwise there is no significant luminal stenosis noted. 
Right Coronary Artery: The RCA is a normal sized vessel, which is dominant. It 
gives rise to a small conus, and a SA tatyana branch, and 1 acute marginal 
branches. In its distal segment it bifurcates into a PDA and RPL branch. There 
is scattered focal calcification with luminal irregularities noted, however 
there is no significant luminal stenosis visualized. ABDOMEN:The limited images 
of the upper abdomen reveal liver cirrhosis with evidence of TIPS. Conclusions: 
1. Quantitative coronary artery calcium Agaston score of 500 and volume score of
470. This calcium score places the patient is &gt; 95th percentile for age 
group. Coronary arteries have normal origins, and normal branching patterns. The
right coronaryartery is dominant. 2. There is mild to moderate eccentric 
calcification involving the proximal to mid LAD and focal calcification at the 
distal LAD. There is mild (25 -49%) luminal stenosis involving the ostium of the
LAD with mixed calcific atherosclerotic changes, otherwise the remaining LAD is 
widely patent with minimal luminal irregularities 3. The left circumflex, and 
right coronary arteries are all widely patent with scattered eccentric mild 
calcification with no significant luminal stenosis.4. The imaged thoracic aorta 
is normal in course, caliber, and contour. 5. Cirrhotic liver with evidence of 
TIPS. An addendum will be dictated regarding the non-vascular findings by the 
Consultant Radiologist. Signed: Jericho Coopereport Verified Date/Time: 
2021 15:08:54 Reading Location: [place] [address] CT Reading Room Electronically
signed by: AARON FELIX MD on 2021 03:35 PMT SPOT [id number] 
11:53:00





             Test Item    Value        Reference Range Interpretation Comments

 

             T-SPOT TB (BEAKER) (test code = Negative                           

    



             1683)                                               

 

             NEG CONTROL SPOT COUNT (BEAKER) 0                                  

    



             (test code = 1684)                                        

 

             PANEL A SPOT (BEAKER) (test code = 0                               

       



             1685)                                               

 

             PANEL B SPOT (BEAKER) (test code = 0                               

       



             1686)                                               

 

             POS CONTROL SPOT CT (BEAKER) (test 0                               

       



             code = 1687)                                        

 

             SCAN RESULT (test code = 7000549)                                  

      



[id number] 11:38:00





             Test Item    Value        Reference Range Interpretation Comments

 

             T3 TOTAL (BEAKER) (test code = 656) 64 ng/dL                 

        



Reference Range  ng/dLCBC W/PLT COUNT &amp; AUTO [id number] 
05:18:00





             Test Item    Value        Reference Range Interpretation Comments

 

             WHITE BLOOD CELL COUNT 4.1 K/ L     3.5-10.5                  



             (BEAKER) (test code =                                        



             775)                                                

 

             RED BLOOD CELL COUNT 2.95 M/ L    4.63-6.08    L            



             (BEAKER) (test code =                                        



             761)                                                

 

             HEMOGLOBIN (BEAKER) 10.7 GM/DL   13.7-17.5    L            



             (test code = 410)                                        

 

             HEMATOCRIT (BEAKER) 30.7 %       40.1-51.0    L            



             (test code = 411)                                        

 

             MEAN CORPUSCULAR 104.1 fL     79.0-92.2    H            Discordant 

MCV



             VOLUME (BEAKER) (test                                        result

s compared to



             code = 753)                                         previous result

s;



                                                                 clinical correl

ation



                                                                 required.

 

             MEAN CORPUSCULAR 36.3 pg      25.7-32.2    H            



             HEMOGLOBIN (BEAKER)                                        



             (test code = 751)                                        

 

             MEAN CORPUSCULAR 34.9 GM/DL   32.3-36.5                 



             HEMOGLOBIN CONC                                        



             (BEAKER) (test code =                                        



             752)                                                

 

             RED CELL DISTRIBUTION 14.4 %       11.6-14.4                 



             WIDTH (BEAKER) (test                                        



             code = 412)                                         

 

             PLATELET COUNT 111 K/CU MM  150-450      L            



             (BEAKER) (test code =                                        



             756)                                                

 

             MEAN PLATELET VOLUME 11.9 fL      9.4-12.4                  



             (BEAKER) (test code =                                        



             754)                                                

 

             NUCLEATED RED BLOOD 0 /100 WBC   0-0                       



             CELLS (BEAKER) (test                                        



             code = 413)                                         

 

             NEUTROPHILS RELATIVE 50 %                                   



             PERCENT (BEAKER) (test                                        



             code = 429)                                         

 

             LYMPHOCYTES RELATIVE 28 %                                   



             PERCENT (BEAKER) (test                                        



             code = 430)                                         

 

             MONOCYTES RELATIVE 12 %                                   



             PERCENT (BEAKER) (test                                        



             code = 431)                                         

 

             EOSINOPHILS RELATIVE 9 %                                    



             PERCENT (BEAKER) (test                                        



             code = 432)                                         

 

             BASOPHILS RELATIVE 1 %                                    



             PERCENT (BEAKER) (test                                        



             code = 437)                                         

 

             NEUTROPHILS ABSOLUTE 2.04 K/ L    1.78-5.38                 



             COUNT (BEAKER) (test                                        



             code = 670)                                         

 

             LYMPHOCYTES ABSOLUTE 1.16 K/ L    1.32-3.57    L            



             COUNT (BEAKER) (test                                        



             code = 414)                                         

 

             MONOCYTES ABSOLUTE 0.50 K/ L    0.30-0.82                 



             COUNT (BEAKER) (test                                        



             code = 415)                                         

 

             EOSINOPHILS ABSOLUTE 0.37 K/ L    0.04-0.54                 



             COUNT (BEAKER) (test                                        



             code = 416)                                         

 

             BASOPHILS ABSOLUTE 0.04 K/ L    0.01-0.08                 



             COUNT (BEAKER) (test                                        



             code = 417)                                         

 

             IMMATURE     0 %          0-1                       



             GRANULOCYTES-RELATIVE                                        



             PERCENT (BEAKER) (test                                        



             code = 2801)                                        



COMPREHENSIVE METABOLIC [id number] 05:10:00





             Test Item    Value        Reference Range Interpretation Comments

 

             TOTAL PROTEIN 5.5 gm/dL    6.0-8.3      L            



             (BEAKER) (test code =                                        



             770)                                                

 

             ALBUMIN (BEAKER) 1.7 g/dL     3.5-5.0      L            



             (test code = 1145)                                        

 

             ALKALINE PHOSPHATASE 101 U/L                          



             (BEAKER) (test code =                                        



             346)                                                

 

             BILIRUBIN TOTAL 4.9 mg/dL    0.2-1.2      H            



             (BEAKER) (test code =                                        



             377)                                                

 

             SODIUM (BEAKER) (test 134 meq/L    136-145      L            



             code = 381)                                         

 

             POTASSIUM (BEAKER) 3.5 meq/L    3.5-5.1                   



             (test code = 379)                                        

 

             CHLORIDE (BEAKER) 105 meq/L                        



             (test code = 382)                                        

 

             CO2 (BEAKER) (test 24 meq/L     22-29                     



             code = 355)                                         

 

             BLOOD UREA NITROGEN 6 mg/dL      7-21         L            



             (BEAKER) (test code =                                        



             354)                                                

 

             CREATININE (BEAKER) 0.72 mg/dL   0.57-1.25                 



             (test code = 358)                                        

 

             GLUCOSE RANDOM 100 mg/dL                        



             (BEAKER) (test code =                                        



             652)                                                

 

             CALCIUM (BEAKER) 7.0 mg/dL    8.4-10.2     L            



             (test code = 697)                                        

 

             AST (SGOT) (BEAKER) 73 U/L       5-34         H            



             (test code = 353)                                        

 

             ALT (SGPT) (BEAKER) 40 U/L       6-55                      



             (test code = 347)                                        

 

             EGFR (BEAKER) (test 115                                    ESTIMATE

D GFR IS



             code = 1092) mL/min/1.73 sq                           NOT AS ACCURA

TE AS



                          m                                      CREATININE



                                                                 CLEARANCE IN



                                                                 PREDICTING



                                                                 GLOMERULAR



                                                                 FILTRATION RATE

.



                                                                 ESTIMATED GFR I

S



                                                                 NOT APPLICABLE 

FOR



                                                                 DIALYSIS PATIEN

TS.



 ID - EDASISpecimen moderately ictericPROTHROMBIN TIME/[id number] 
04:53:00





             Test Item    Value        Reference Range Interpretation Comments

 

             PROTIME (BEAKER) (test code = 25.8 seconds 11.9-14.2    H          

  



             759)                                                

 

             INR (BEAKER) (test code = 370) 2.45         <=5.90                 

   



Effective 2019: PT Reference Range ChangeNew: 11.9-14.2 Previous: 11.7-
14.7RECOMMENDED COUMADIN/WARFARIN INR THERAPY RANGESSTANDARD DOSE: 2.0-3.0 
Includes: PROPHYLAXIS for venous thrombosis, systemic embolization; TREATMENT 
for venous thrombosis and/or pulmonary embolus.HIGH RISK: Target INR is 2.5-3.5 
for patients wiht mechanical heart valves.COMPREHENSIVE METABOLIC PANEL
2021 06:06:00





             Test Item    Value        Reference Range Interpretation Comments

 

             TOTAL PROTEIN 6.2 gm/dL    6.0-8.3                   



             (BEAKER) (test code =                                        



             770)                                                

 

             ALBUMIN (BEAKER) 1.8 g/dL     3.5-5.0      L            



             (test code = 1145)                                        

 

             ALKALINE PHOSPHATASE 112 U/L                          



             (BEAKER) (test code =                                        



             346)                                                

 

             BILIRUBIN TOTAL 5.6 mg/dL    0.2-1.2      H            



             (BEAKER) (test code =                                        



             377)                                                

 

             SODIUM (BEAKER) (test 132 meq/L    136-145      L            



             code = 381)                                         

 

             POTASSIUM (BEAKER) 3.8 meq/L    3.5-5.1                   



             (test code = 379)                                        

 

             CHLORIDE (BEAKER) 104 meq/L                        



             (test code = 382)                                        

 

             CO2 (BEAKER) (test 24 meq/L     22-29                     



             code = 355)                                         

 

             BLOOD UREA NITROGEN 9 mg/dL      7-21                      



             (BEAKER) (test code =                                        



             354)                                                

 

             CREATININE (BEAKER) 0.81 mg/dL   0.57-1.25                 



             (test code = 358)                                        

 

             GLUCOSE RANDOM 102 mg/dL                        



             (BEAKER) (test code =                                        



             652)                                                

 

             CALCIUM (BEAKER) 7.2 mg/dL    8.4-10.2     L            



             (test code = 697)                                        

 

             AST (SGOT) (BEAKER) 88 U/L       5-34         H            



             (test code = 353)                                        

 

             ALT (SGPT) (BEAKER) 47 U/L       6-55                      



             (test code = 347)                                        

 

             EGFR (BEAKER) (test 100                                    ESTIMATE

D GFR IS



             code = 1092) mL/min/1.73 sq                           NOT AS ACCURA

TE AS



                          m                                      CREATININE



                                                                 CLEARANCE IN



                                                                 PREDICTING



                                                                 GLOMERULAR



                                                                 FILTRATION RATE

.



                                                                 ESTIMATED GFR I

S



                                                                 NOT APPLICABLE 

FOR



                                                                 DIALYSIS PATIEN

TS.



 ID - EDASISpecimen moderately ictericCBC W/PLT COUNT &amp; AUTO 
[id number] 05:40:00





             Test Item    Value        Reference Range Interpretation Comments

 

             WHITE BLOOD CELL COUNT (BEAKER) 6.2 K/ L     3.5-10.5              

    



             (test code = 775)                                        

 

             RED BLOOD CELL COUNT (BEAKER) 3.33 M/ L    4.63-6.08    L          

  



             (test code = 761)                                        

 

             HEMOGLOBIN (BEAKER) (test code = 12.0 GM/DL   13.7-17.5    L       

     



             410)                                                

 

             HEMATOCRIT (BEAKER) (test code = 36.0 %       40.1-51.0    L       

     



             411)                                                

 

             MEAN CORPUSCULAR VOLUME (BEAKER) 108.1 fL     79.0-92.2    H       

     



             (test code = 753)                                        

 

             MEAN CORPUSCULAR HEMOGLOBIN 36.0 pg      25.7-32.2    H            



             (BEAKER) (test code = 751)                                        

 

             MEAN CORPUSCULAR HEMOGLOBIN CONC 33.3 GM/DL   32.3-36.5            

     



             (BEAKER) (test code = 752)                                        

 

             RED CELL DISTRIBUTION WIDTH 14.6 %       11.6-14.4    H            



             (BEAKER) (test code = 412)                                        

 

             PLATELET COUNT (BEAKER) (test 128 K/CU MM  150-450      L          

  



             code = 756)                                         

 

             MEAN PLATELET VOLUME (BEAKER) 11.7 fL      9.4-12.4                

  



             (test code = 754)                                        

 

             NUCLEATED RED BLOOD CELLS 0 /100 WBC   0-0                       



             (BEAKER) (test code = 413)                                        

 

             NEUTROPHILS RELATIVE PERCENT 57 %                                  

 



             (BEAKER) (test code = 429)                                        

 

             LYMPHOCYTES RELATIVE PERCENT 21 %                                  

 



             (BEAKER) (test code = 430)                                        

 

             MONOCYTES RELATIVE PERCENT 10 %                                   



             (BEAKER) (test code = 431)                                        

 

             EOSINOPHILS RELATIVE PERCENT 10 %                                  

 



             (BEAKER) (test code = 432)                                        

 

             BASOPHILS RELATIVE PERCENT 1 %                                    



             (BEAKER) (test code = 437)                                        

 

             NEUTROPHILS ABSOLUTE COUNT 3.48 K/ L    1.78-5.38                 



             (BEAKER) (test code = 670)                                        

 

             LYMPHOCYTES ABSOLUTE COUNT 1.31 K/ L    1.32-3.57    L            



             (BEAKER) (test code = 414)                                        

 

             MONOCYTES ABSOLUTE COUNT (BEAKER) 0.64 K/ L    0.30-0.82           

      



             (test code = 415)                                        

 

             EOSINOPHILS ABSOLUTE COUNT 0.63 K/ L    0.04-0.54    H            



             (BEAKER) (test code = 416)                                        

 

             BASOPHILS ABSOLUTE COUNT (BEAKER) 0.07 K/ L    0.01-0.08           

      



             (test code = 417)                                        

 

             IMMATURE GRANULOCYTES-RELATIVE 1 %          0-1                    

   



             PERCENT (BEAKER) (test code =                                      

  



             2801)                                               



PROTHROMBIN [id number] 05:23:00





             Test Item    Value        Reference Range Interpretation Comments

 

             PROTIME (BEAKER) (test code = 24.0 seconds 11.9-14.2    H          

  



             759)                                                

 

             INR (BEAKER) (test code = 370) 2.21         <=5.90                 

   



Effective 2019: PT Reference Range ChangeNew: 11.9-14.2 Previous: 11.7-
14.7RECOMMENDED COUMADIN/WARFARIN INR THERAPY RANGESSTANDARD DOSE: 2.0-3.0 
Includes: PROPHYLAXIS for venous thrombosis, systemic embolization; TREATMENT 
for venous thrombosis and/or pulmonary embolus.HIGH RISK: Target INR is 2.5-3.5 
for patients wiht mechanical heart valves.COMPREHENSIVE METABOLIC PANEL
2021 06:43:00





             Test Item    Value        Reference Range Interpretation Comments

 

             TOTAL PROTEIN 5.7 gm/dL    6.0-8.3      L            



             (BEAKER) (test code =                                        



             770)                                                

 

             ALBUMIN (BEAKER) 1.7 g/dL     3.5-5.0      L            



             (test code = 1145)                                        

 

             ALKALINE PHOSPHATASE 77 U/L                           



             (BEAKER) (test code =                                        



             346)                                                

 

             BILIRUBIN TOTAL 6.5 mg/dL    0.2-1.2      H            



             (BEAKER) (test code =                                        



             377)                                                

 

             SODIUM (BEAKER) (test 134 meq/L    136-145      L            



             code = 381)                                         

 

             POTASSIUM (BEAKER) 3.7 meq/L    3.5-5.1                   



             (test code = 379)                                        

 

             CHLORIDE (BEAKER) 106 meq/L                        



             (test code = 382)                                        

 

             CO2 (BEAKER) (test 23 meq/L     22-29                     



             code = 355)                                         

 

             BLOOD UREA NITROGEN 8 mg/dL      7-21                      



             (BEAKER) (test code =                                        



             354)                                                

 

             CREATININE (BEAKER) 0.70 mg/dL   0.57-1.25                 



             (test code = 358)                                        

 

             GLUCOSE RANDOM 82 mg/dL                         



             (BEAKER) (test code =                                        



             652)                                                

 

             CALCIUM (BEAKER) 7.1 mg/dL    8.4-10.2     L            



             (test code = 697)                                        

 

             AST (SGOT) (BEAKER) 85 U/L       5-34         H            



             (test code = 353)                                        

 

             ALT (SGPT) (BEAKER) 44 U/L       6-55                      



             (test code = 347)                                        

 

             EGFR (BEAKER) (test 119                                    ESTIMATE

D GFR IS



             code = 1092) mL/min/1.73 sq                           NOT AS ACCURA

TE AS



                          m                                      CREATININE



                                                                 CLEARANCE IN



                                                                 PREDICTING



                                                                 GLOMERULAR



                                                                 FILTRATION RATE

.



                                                                 ESTIMATED GFR I

S



                                                                 NOT APPLICABLE 

FOR



                                                                 DIALYSIS PATIEN

TS.



 ID - ADMINSpecimen moderately ictericPROTHROMBIN TIME/[id number] 
06:38:00





             Test Item    Value        Reference Range Interpretation Comments

 

             PROTIME (BEAKER) (test code = 24.9 seconds 11.9-14.2    H          

  



             759)                                                

 

             INR (BEAKER) (test code = 370) 2.32         <=5.90                 

   



Effective 2019: PT Reference Range ChangeNew: 11.9-14.2 Previous: 11.7-
14.7RECOMMENDED COUMADIN/WARFARIN INR THERAPY RANGESSTANDARD DOSE: 2.0-3.0 
Includes: PROPHYLAXIS for venous thrombosis, systemic embolization; TREATMENT 
for venous thrombosis and/or pulmonary embolus.HIGH RISK: Target INR is 2.5-3.5 
for patients wiht mechanical heart valves.CBC W/PLT COUNT &amp; AUTO 
[id number] 06:14:00





             Test Item    Value        Reference Range Interpretation Comments

 

             WHITE BLOOD CELL COUNT (BEAKER) 4.0 K/ L     3.5-10.5              

    



             (test code = 775)                                        

 

             RED BLOOD CELL COUNT (BEAKER) 2.93 M/ L    4.63-6.08    L          

  



             (test code = 761)                                        

 

             HEMOGLOBIN (BEAKER) (test code = 10.6 GM/DL   13.7-17.5    L       

     



             410)                                                

 

             HEMATOCRIT (BEAKER) (test code = 31.4 %       40.1-51.0    L       

     



             411)                                                

 

             MEAN CORPUSCULAR VOLUME (BEAKER) 107.2 fL     79.0-92.2    H       

     



             (test code = 753)                                        

 

             MEAN CORPUSCULAR HEMOGLOBIN 36.2 pg      25.7-32.2    H            



             (BEAKER) (test code = 751)                                        

 

             MEAN CORPUSCULAR HEMOGLOBIN CONC 33.8 GM/DL   32.3-36.5            

     



             (BEAKER) (test code = 752)                                        

 

             RED CELL DISTRIBUTION WIDTH 14.5 %       11.6-14.4    H            



             (BEAKER) (test code = 412)                                        

 

             PLATELET COUNT (BEAKER) (test 105 K/CU MM  150-450      L          

  



             code = 756)                                         

 

             MEAN PLATELET VOLUME (BEAKER) 11.8 fL      9.4-12.4                

  



             (test code = 754)                                        

 

             NUCLEATED RED BLOOD CELLS 0 /100 WBC   0-0                       



             (BEAKER) (test code = 413)                                        

 

             NEUTROPHILS RELATIVE PERCENT 54 %                                  

 



             (BEAKER) (test code = 429)                                        

 

             LYMPHOCYTES RELATIVE PERCENT 22 %                                  

 



             (BEAKER) (test code = 430)                                        

 

             MONOCYTES RELATIVE PERCENT 14 %                                   



             (BEAKER) (test code = 431)                                        

 

             EOSINOPHILS RELATIVE PERCENT 9 %                                   

 



             (BEAKER) (test code = 432)                                        

 

             BASOPHILS RELATIVE PERCENT 1 %                                    



             (BEAKER) (test code = 437)                                        

 

             NEUTROPHILS ABSOLUTE COUNT 2.17 K/ L    1.78-5.38                 



             (BEAKER) (test code = 670)                                        

 

             LYMPHOCYTES ABSOLUTE COUNT 0.88 K/ L    1.32-3.57    L            



             (BEAKER) (test code = 414)                                        

 

             MONOCYTES ABSOLUTE COUNT (BEAKER) 0.55 K/ L    0.30-0.82           

      



             (test code = 415)                                        

 

             EOSINOPHILS ABSOLUTE COUNT 0.36 K/ L    0.04-0.54                 



             (BEAKER) (test code = 416)                                        

 

             BASOPHILS ABSOLUTE COUNT (BEAKER) 0.05 K/ L    0.01-0.08           

      



             (test code = 417)                                        

 

             IMMATURE GRANULOCYTES-RELATIVE 1 %          0-1                    

   



             PERCENT (BEAKER) (test code =                                      

  



             2801)                                               



DELL CUEVA [id number] 17:05:00Referring: Dr. Qiana Liu Reason for 
exam:-&gt;Eval TIPS stenosis
************************************************************[facility] : 1970 Sex: 
M************************************************************FINAL REPORT 
PATIENT ID: [id number] History: Cirrhosis, portal hypertension, decreased 
velocities within indwelling TIPS concerning for stenosis. Modality: Sonography 
and fluoroscopy. Sedation: Moderate sedation was administered. 1.5 mg of Versed 
and 75 mcg of fentanyl IV was used for moderate sedation monitored under my 
direction. Total intra-service time of sedation was 45 minutes. The patient's 
vitalsigns were monitored throughout the procedure and recorded in the patient's
medical record by the nurse. : Dheeraj Durán MD. Assistant: 
None. Approach: Right internal jugular vein Estimated blood loss: &lt; 5 cc. 
Specimen: None. Fluoroscopy Time: 6.0 min.Reference Air Kerma (Ka, r): 184.4 
mGy. Technique: Informed written consent was obtained. Discussion of risks, 
benefits, and alternatives were made with the patient. The patient expressed 
understanding and agreed to proceed. A universal timeout was performed prior to 
starting the procedure. All elements maximal sterile barrier technique was 
utilized for this procedure, including utilization of sterile scrub solution for
skin prep, a large sterile sheet to cover the areas of the patient that were not
prepped, and hand hygiene, mask, head covering, and sterile gown for performing 
radiologist and scrub technologist. Initial ultrasound images demonstrate patent
right internal jugular vein. Using ultrasound guidance, following acquisition of
permanent images, the right internal jugular vein was accessed using a 21-gauge 
micropuncture needle. A 0.018 inch wire was advanced into the SVC. The needle 
was exchanged for a 4 Argentine micropuncture sheath. The micropuncture sheath was 
exchanged over a 0.035 Bentson wire for a 7 Argentine x 10 cm vascular sheath. 
Using a 5 Argentine H1 catheter and 0.035 angled Glidewire, the TIPS shunt was 
cannulated and catheter manipulated into the main portal vein. A portogram was 
performed and portosystemicpressure measurements were obtained. The stenosis 
within the hepatic venous end of the stent was then angioplastied using a 10 mm 
high-pressure balloon. Post angioplasty portogram was performed and repeat 
pressure measurements obtained. All catheters and wires removed. The sheath was 
removed and hemostasis achieved with manual compression. A sterile dressing was 
applied. The patient tolerated procedure without immediate complication. 
FINDINGS/IMPRESSION: Portogram demonstrates patent TIPS with focalstenosis 
towards the the hepatic venous end which was successfully treated using a 10 mm 
high-pressure balloon. Repeat portogram demonstrates patency of the TIPS with no
significant residual stenosis. Pressure measurements as follows: Pre-
angioplasty:Main portal vein - 24 mmHgRight atrium - 6 mmHg Post-
angioplasty:Main portal vein - 18 mmHgRight atrium - 8 mmHg Signed: Dheeraj Durná MDRpineda VerifiedDate/Time: 2021 17:05:16 Reading Location: [place] [address] Angio Body Reading Room Electronically signed by: DHEERAJ DURÁN MD on 
2021 05:05 PMPET/CT, CARDIAC PERF REST AND [id number] 16:10:00
Referring: Dr. Qiana Joshi for exam:-&gt;liver transplant evaluation
************************************************************[facility] 1970 Sex: 
M************************************************************FINAL REPORT 
PATIENT ID: [id number] PROCEDURE: MYOCARDIAL PERFUSION PET IMAGING 
(Rest/Stress)CPT CODE: 24436 INDICATION: Evaluation for CAD prior to liver 
transplant CARDIOVASCULAR PROFILE:CAD History: NoneSymptoms: NoneRisk Factors: 
NoneBMI: 27.2Medications: None STRESS PROTOCOL:Pharmacologic stress wasachieved 
with a 10-second intravenous infusion of regadenoson 0.4 mg. The 
radiopharmaceutical was administered 30 seconds after the start of the 
regadenoson infusion. IMAGING PROTOCOL:Limited low-dose CT imaging was performed
for attenuation correction. 40.1 mCi of Rb-82 chloride was injected intraveno
usly at rest, and gated PET images were obtained. Then, 40.1 mCi of Rb-82 
chloride was injected intravenously at peak stress, and gated PET images were 
obtained. Image quality is good. REST FINDINGS:HR: 85/minBP: 106/50 mmHgPrelim. 
EKG: Normal sinus rhythm.Perfusion: there is a small mild, defect in the apex 
and apical anterior wall.Wall Motion: Normal (LVEF greater than 70%).LV Volume: 
Normal.RV Volume: Normal. STRESS FINDINGS:HR: 96/min (54% of MPHR)BP: 105/52 
mmHgPrelim. EKG: No ischemic changes.Symptoms: None (treatment not 
required).Perfusion: there is a small, moderate severity defect in the apex and 
apical anterior wall.Wall Motion: Normal (LVEF greater than 70%).LV Volume: Not 
significantly changed from rest. IMPRESSION:1. Abnormal study.2. Abnormal 
myocardial perfusion. There is a small size, moderate severity, mostly 
reversible perfusion abnormality in the apex and apical anterior LV.3. Normal 
resting LVEF, which does not deteriorate with pharmacologic stress.4. Normal 
extracardiac tracer distribution.5. There is no prior study for comparison. 
Signed: Anthony Laughlin MDReport Verified Date/Time: 2021 16:10:19 Reading 
Location: [address] Reading Room Electronically signed by: 
ANTHONY LAUGHLIN MD on 2021 04:10 PMALBUMIN PLEURAL [id number] 
14:17:00





             Test Item    Value        Reference Range Interpretation Comments

 

             ALBUMIN, PLEURAL FLUID                                        ALBUM

IN, PLEURAL FLUID =



             (BEAKER) (test code =                                        0.4 g/

dLTEST PERFORMED AT



             [id number])                                            [facility]



PROTEIN, BODY [id number] 14:15:00





             Test Item    Value        Reference Range Interpretation Comments

 

             PROTEIN FLUID (BEAKER)                                        PROTE

IN, PLEURAL = 1.0



             (test code = 579)                                        g/dLTEST P

ERFORMED AT



                                                                 [facility]



Absence of reference range indicates that normals have not been defined.Assay 
performance has not been validated for this type of specimen.RUBELLA ANTIBODY, 
[id number] 12:43:00





             Test Item    Value        Reference Range Interpretation Comments

 

             RUBELLA IGG QUANTITATION (BEAKER) 19.0 IU/mL   <8.0         H      

      



             (test code = 572)                                        



Rubella IgG Result Interpretation: &lt;/= 7.0 IU/mL Negative - Presumed non-
immune 8.0 - 9.9 IU/mL Equivocal &gt;= 10.0 IU/mL Positive - Presumed immune
VARICELLA ZOSTER ANTIBODY, [id number] 12:43:00





             Test Item    Value        Reference Range Interpretation Comments

 

             VARICELLA ZOSTER IGG (AL) (BEAKER) >                               

       



             (test code = 3197)                                        



VARICELLA ZOSTER RESULT INTERPRETATIONS: &lt;=0.8 Al Nonreactive: Presumed non-
immune to VZV 0.9-1.0Al Equivocal &gt;=1.1 Al Reactive: Presumed immune to VZV
U/S, ABDOMINAL, [id number] 12:34:00Referring: Dr. Qiana Liu Labs to
be ordered:-&gt;No Labs Needed Reason for exam:-&gt;therapeutic para
************************************************************[facility] 1970 Sex: 
M************************************************************FINAL REPORT 
PATIENT ID: [id number] History: Ascites. PROCEDURE: Limited sonographic 
examination of theabdomen was performed in preparation for planned ultrasound-
guided paracentesis. Limited sonographicexamination of the abdomen showed only a
trace amount of fluid, primarily in the right lower quadrant. Therefore, 
paracentesis was not performed. IMPRESSION: 1. Trace ascites, not enough for 
planned therapeutic paracentesis. Signed: Dheeraj Durán Verified 
Date/Time: 2021 12:34:52 Reading Location: [address] Ultrasound Reading
Room Electronically signed by: DHEERAJ DURÁN MD on 2021 12:34 PMEBV 
ANTIBODY, [id number] 11:36:00





             Test Item    Value        Reference Range Interpretation Comments

 

             RUPESH PEREZ VIRAL CAPSID Positive     Negative, Equivocal A       

     



             ANTIGEN IGG (BEAKER) (test code                                    

    



             = 3415)                                             



Rupesh Perez Viral Capsid Antigen IgG Result Interpretation: &lt;/= 0.8 Al 
Negative 0.9-1.0 Al Equivocal &gt;/= 1.1 Al [id number] 11:35:00





             Test Item    Value        Reference Range Interpretation Comments

 

             RPR SCREEN (BEAKER) (test code = Nonreactive  Nonreactive          

     



             420)                                                



CRYPTOCOCCAL [id number] 11:35:00





             Test Item    Value        Reference Range Interpretation Comments

 

             CRYPTOCOCCAL ANTIGEN, SERUM Negative     Negative, Interference    

          



             (BEAKER) (test code = 1828)                                        



CYTOMEGALOVIRUS ANTIBODY, [id number] 10:00:00





             Test Item    Value        Reference Range Interpretation Comments

 

             CYTOMEGALOVIRUS, IGG (BEAKER) Positive     Negative, Equivocal A   

         



             (test code = 3429)                                        



CMV IgG Result Interpretation: &lt;/= 0.8 Al Negative 0.9-1.0 Al Equivocal 
&gt;/=1.1 Al PositiveCYTOMEGALOVIRUS ANTIBODY, [id number] 10:00:00





             Test Item    Value        Reference Range Interpretation Comments

 

             CYTOMEGALOVIRUS IGM ANTIBODY Negative     Negative, Equivocal      

        



             (BEAKER) (test code = 3437)                                        



CMV IgM Result Interpretation: &lt;/= 0.8 Al Negative 0.9-1.0 Al Equivocal 
&gt;/= 1.1 Al PositiveEBV ANTIBODY, [id number] 10:00:00





             Test Item    Value        Reference Range Interpretation Comments

 

             RUPESH PEREZ VIRAL CAPSID Negative     Negative, Equivocal         

     



             ANTIGEN IGM (BEAKER) (test code                                    

    



             = 3418)                                             



Rupesh Perez Viral Capsid Antigen IgM Result Interpretation: &lt;/= 0.8 Al 
Negative 0.9-1.0 Al Equivocal &gt;/= 1.1 Al PositiveCOMPREHENSIVE METABOLIC 
[id number] 06:31:00





             Test Item    Value        Reference Range Interpretation Comments

 

             TOTAL PROTEIN 6.0 gm/dL    6.0-8.3                   



             (BEAKER) (test code =                                        



             770)                                                

 

             ALBUMIN (BEAKER) 1.8 g/dL     3.5-5.0      L            



             (test code = 1145)                                        

 

             ALKALINE PHOSPHATASE 89 U/L                           



             (BEAKER) (test code =                                        



             346)                                                

 

             BILIRUBIN TOTAL 7.2 mg/dL    0.2-1.2      H            



             (BEAKER) (test code =                                        



             377)                                                

 

             SODIUM (BEAKER) (test 130 meq/L    136-145      L            



             code = 381)                                         

 

             POTASSIUM (BEAKER) 3.8 meq/L    3.5-5.1                   



             (test code = 379)                                        

 

             CHLORIDE (BEAKER) 102 meq/L                        



             (test code = 382)                                        

 

             CO2 (BEAKER) (test 24 meq/L     22-29                     



             code = 355)                                         

 

             BLOOD UREA NITROGEN 7 mg/dL      7-21                      



             (BEAKER) (test code =                                        



             354)                                                

 

             CREATININE (BEAKER) 0.75 mg/dL   0.57-1.25                 



             (test code = 358)                                        

 

             GLUCOSE RANDOM 83 mg/dL                         



             (BEAKER) (test code =                                        



             652)                                                

 

             CALCIUM (BEAKER) 7.3 mg/dL    8.4-10.2     L            



             (test code = 697)                                        

 

             AST (SGOT) (BEAKER) 90 U/L       5-34         H            



             (test code = 353)                                        

 

             ALT (SGPT) (BEAKER) 49 U/L       6-55                      



             (test code = 347)                                        

 

             EGFR (BEAKER) (test 110                                    ESTIMATE

D GFR IS



             code = 1092) mL/min/1.73 sq                           NOT AS ACCURA

TE AS



                          m                                      CREATININE



                                                                 CLEARANCE IN



                                                                 PREDICTING



                                                                 GLOMERULAR



                                                                 FILTRATION RATE

.



                                                                 ESTIMATED GFR I

S



                                                                 NOT APPLICABLE 

FOR



                                                                 DIALYSIS PATIEN

TS.



 ID - ANGELIQUE WSpecimen moderately ictericPROTHROMBIN TIME/[id number] 
04:57:00





             Test Item    Value        Reference Range Interpretation Comments

 

             PROTIME (BEAKER) (test code = 26.5 seconds 11.9-14.2    H          

  



             759)                                                

 

             INR (BEAKER) (test code = 370) 2.51         <=5.90                 

   



Effective 2019: PT Reference Range ChangeNew: 11.9-14.2 Previous: 11.7-
14.7RECOMMENDED COUMADIN/WARFARIN INR THERAPY RANGESSTANDARD DOSE: 2.0-3.0 
Includes: PROPHYLAXIS for venous thrombosis, systemic embolization; TREATMENT 
for venous thrombosis and/or pulmonary embolus.HIGH RISK: Target INR is 2.5-3.5 
for patients wiht mechanical heart valves.CBC W/PLT COUNT &amp; AUTO 
[id number] 04:45:00





             Test Item    Value        Reference Range Interpretation Comments

 

             WHITE BLOOD CELL COUNT (BEAKER) 5.4 K/ L     3.5-10.5              

    



             (test code = 775)                                        

 

             RED BLOOD CELL COUNT (BEAKER) 2.94 M/ L    4.63-6.08    L          

  



             (test code = 761)                                        

 

             HEMOGLOBIN (BEAKER) (test code = 10.7 GM/DL   13.7-17.5    L       

     



             410)                                                

 

             HEMATOCRIT (BEAKER) (test code = 30.5 %       40.1-51.0    L       

     



             411)                                                

 

             MEAN CORPUSCULAR VOLUME (BEAKER) 103.7 fL     79.0-92.2    H       

     



             (test code = 753)                                        

 

             MEAN CORPUSCULAR HEMOGLOBIN 36.4 pg      25.7-32.2    H            



             (BEAKER) (test code = 751)                                        

 

             MEAN CORPUSCULAR HEMOGLOBIN CONC 35.1 GM/DL   32.3-36.5            

     



             (BEAKER) (test code = 752)                                        

 

             RED CELL DISTRIBUTION WIDTH 14.4 %       11.6-14.4                 



             (BEAKER) (test code = 412)                                        

 

             PLATELET COUNT (BEAKER) (test 112 K/CU MM  150-450      L          

  



             code = 756)                                         

 

             MEAN PLATELET VOLUME (BEAKER) 11.9 fL      9.4-12.4                

  



             (test code = 754)                                        

 

             NUCLEATED RED BLOOD CELLS 0 /100 WBC   0-0                       



             (BEAKER) (test code = 413)                                        

 

             NEUTROPHILS RELATIVE PERCENT 55 %                                  

 



             (BEAKER) (test code = 429)                                        

 

             LYMPHOCYTES RELATIVE PERCENT 21 %                                  

 



             (BEAKER) (test code = 430)                                        

 

             MONOCYTES RELATIVE PERCENT 13 %                                   



             (BEAKER) (test code = 431)                                        

 

             EOSINOPHILS RELATIVE PERCENT 8 %                                   

 



             (BEAKER) (test code = 432)                                        

 

             BASOPHILS RELATIVE PERCENT 1 %                                    



             (BEAKER) (test code = 437)                                        

 

             NEUTROPHILS ABSOLUTE COUNT 3.01 K/ L    1.78-5.38                 



             (BEAKER) (test code = 670)                                        

 

             LYMPHOCYTES ABSOLUTE COUNT 1.15 K/ L    1.32-3.57    L            



             (BEAKER) (test code = 414)                                        

 

             MONOCYTES ABSOLUTE COUNT (BEAKER) 0.73 K/ L    0.30-0.82           

      



             (test code = 415)                                        

 

             EOSINOPHILS ABSOLUTE COUNT 0.45 K/ L    0.04-0.54                 



             (BEAKER) (test code = 416)                                        

 

             BASOPHILS ABSOLUTE COUNT (BEAKER) 0.06 K/ L    0.01-0.08           

      



             (test code = 417)                                        

 

             IMMATURE GRANULOCYTES-RELATIVE 1 %          0-1                    

   



             PERCENT (BEAKER) (test code =                                      

  



             2801)                                               



SARS-COV2/RT-PCR ([facility] [place] [facility])2021 21:15:00





             Test Item    Value        Reference Range Interpretation Comments

 

             SARS-COV2/RT-PCR (test Negative     Not Detected, Negative,        

      



             code = 3708390)              See external report for              



                                       linked test               

 

             SARS-COV-2 PERFORMING LAB [facility]                             



             (test code = 5211847)                                        



Negative result for this test determines that SARS-CoV-2 RNA was not present in 
the specimen above the Limit of Detection (LOD). However, Negative results do 
not preclude SARS-CoV-2 infection and should not be used as the sole basis for 
treatment or patient management decisions. Negative results must be combined 
with clinical observations, patient history, and epidemiological information. A 
false negative result may occur if a specimen is improperly collected, 
transported or handled. A false negative result should be considered if 
patient's recent exposures or clinical presentation indicate that COVID-19 
(SARS-CoV-2) is likely and diagnostic tests for other causes of illness are 
negative. Re-testing should be considered in cases of suspected false 
negatives.The limit of detection for this assay is 800 copies/mL.This SARS CoV-2
test is a real-time RT-PCR test intended for the qualitative detection of 
nucleic acid from SARS-CoV-2 in a nasopharyngeal swab specimen collected from 
individuals suspected of COVID-19 by their healthcare provider.This test has not
been Food and Drug Administration (FDA) cleared or approved. This is a modified 
version of an approved Emergency Use Authorization (EUA) and is in the process 
of review by the FDA. Once authorized by the FDA, the issued EUA will be 
effective until the declaration that circumstances exist justifying the 
authorization of the emergency use ofin vitro diagnostic tests for detection 
and/or diagnosis of COVID-19 is terminated under Section 564(b)(2) of the Act or
the EUA is revoked under Section 564(g) of the Act.Fact Sheet for Healthcare 
Prov
iders:https://www.[employer].com/sites/default/files/product/documents/Fact_Sheet_HC

_Syindpobq_Qykj_LCEF-KeW-7.pdfFact Sheet for Healthcare 
Patients:https://www.[employer].Wenwo/sites/default/files/product/docume
nts/Mpow_Oywmi_Ykwrqkuj_Ruiy_DUNU-QtP-7.pdfPerforming Laboratory:[place] [address], [place] [id number] 
20:48:00





             Test Item    Value        Reference Range Interpretation Comments

 

             FERRITIN (BEAKER) (test code = 440.42 ng/mL 5..00  H         

   



             361)                                                



 ID - RHONDA MVITAMIN D, [id number] 19:53:00





             Test Item    Value        Reference Range Interpretation Comments

 

             VITAMIN D 25-OH (BEAKER) (test code = < ng/mL      6.6-49.9     L  

          



             2764)                                               



Effective 10/11/2017: Reference Range ChangeNew: 6.6-49.9 ng/mL Previous: 13.0-
47.8 ng/mLRecommendedVitamin D Target Range: 30.0-40.0 ng/mLOperator ID - FSE
HEPATITIS A ANTIBODY, [id number] 19:53:00





             Test Item    Value        Reference Range Interpretation Comments

 

             HEPATITIS A IGG ANTIBODY (BEAKER) Reactive     Nonreactive  A      

      



             (test code = 2797)                                        



 ID - [id number] 19:47:00





             Test Item    Value        Reference Range Interpretation Comments

 

             PROSTATE SPECIFIC ANTIGEN (BEAKER) 0.4 ng/mL    0.0-4.0            

       



             (test code = 844)                                        



 ID - FSECT, CHEST, WITHOUT [id number] 19:42:00Referring: Dr. Qiana LiuUnlisted Reason for Exam - Click Yes and Enter Reason 
Below-&gt;YesUnlisted Reason for Exam-&gt;liver transplant evaluation
************************************************************[facility] : 1970 Sex: 
M************************************************************FINAL REPORT 
PATIENT ID: [id number] TECHNIQUE: CT scan of the chest WITHOUT intravenous 
contrast. Dose modulation, iterative reconstruction, and/or weight-based 
adjustment of the mA/kV was utilized to reduce the radiation dose to as low as 
reasonably achievable. INDICATION: Unlisted Reason for Examliver transplant 
evaluation. COMPARISON: 2016 CT abdomen/pelvis. FINDINGS: ABSENCE OF 
INTRAVENOUS CONTRAST DECREASES SENSITIVITY FOR DETECTION OF FOCAL LESIONS AND 
VASCULAR PATHOLOGY. LINES/TUBES: None. LUNGS AND AIRWAYS: No consolidation or 
suspicious lesion. Tracheobronchial airways are clear. PLEURA:The pleural spaces
are clear. HEART AND MEDIASTINUM: The visualized thyroid gland is normal. No 
mediastinal, hilar, or axillary lymphadenopathy. Heart is normal size. No 
pericardial effusion or aneurysmal dilatation. SOFT TISSUES AND BONES: 
Unremarkable. UPPER ABDOMEN: Cirrhotic liver with TIPS. Moderate volume of 
hypoattenuating ascites and partially imaged spleen is enlarged. Gallstones 
present within the gallbladder IMPRESSION:1. No evidence of an acute or active 
intrathoracic abnormality. 2. 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 
hypertension. 3. Cholelithiasis. Signed: [facility] Verified 
Date/Time: 2021 19:42:49 Electronically signed by: FRANCO RODARTE MD on 
2021 07:42 PMHEPATITIS B SURFACE [id number] 19:36:00





             Test Item    Value        Reference Range Interpretation Comments

 

             HEPATITIS B SURFACE ANTIBODY < mIU/mL     <8.0                     

 



             (BEAKER) (test code = 647)                                        



 ID - FSECARCINOEMBRYONIC ANTIGEN (CEA)2021 19:36:00





             Test Item    Value        Reference Range Interpretation Comments

 

             CARCINOEMBRYONIC ANTIGEN (BEAKER) 4.1 ng/mL    0.0-5.0             

      



             (test code = 685)                                        



 ID - FSEHEPATITIS B CORE ANTIBODY, [id number] 19:36:00





             Test Item    Value        Reference Range Interpretation Comments

 

             HEPATITIS B CORE IGM ANTIBODY Nonreactive  Nonreactive             

  



             (BEAKER) (test code = 645)                                        



 ID - FSEHEPATITIS A ANTIBODY, [id number] 19:36:00





             Test Item    Value        Reference Range Interpretation Comments

 

             HEPATITIS A IGM ANTIBODY (BEAKER) Nonreactive  Nonreactive         

      



             (test code = 498)                                        



 ID - FSEURINALYSIS W/ REFLEX URINE [id number] 19:34:00





             Test Item    Value        Reference Range Interpretation Comments

 

             COLOR (BEAKER) (test code = 470) Brown                             

     

 

             CLARITY (BEAKER) (test code = 469) Clear                           

       

 

             SPECIFIC GRAVITY UA (BEAKER) (test 1.036        1.001-1.035  H     

       



             code = 468)                                         

 

             PH UA (BEAKER) (test code = 467) 6.0          5.0-8.0              

     

 

             PROTEIN UA (BEAKER) (test code = 20 mg/dL     Negative     A       

     



             464)                                                

 

             GLUCOSE UA (BEAKER) (test code = Negative     Negative             

     



             365)                                                

 

             KETONES UA (BEAKER) (test code = Negative     Negative             

     



             371)                                                

 

             BILIRUBIN UA (BEAKER) (test code = Positive     Negative     A     

       



             462)                                                

 

             BLOOD UA (BEAKER) (test code = 461) Negative     Negative          

        

 

             NITRITE UA (BEAKER) (test code = Negative     Negative             

     



             465)                                                

 

             LEUKOCYTE ESTERASE UA (BEAKER) (test Negative     Negative         

         



             code = 466)                                         

 

             UROBILINOGEN UA (BEAKER) (test code > mg/dL      0.2-1.0      H    

        



             = 463)                                              

 

             RBC UA (BEAKER) (test code = 519) 0 /HPF                           

      

 

             WBC UA (BEAKER) (test code = 520) 3 /HPF                           

      

 

             MUCUS (BEAKER) (test code = 1574) Many                             

      

 

             SOURCE(BEAKER) (test code = 2797)                                  

      



 ID - [auto] ID - techHEPATITIS B SURFACE [id number] 
19:34:00





             Test Item    Value        Reference Range Interpretation Comments

 

             HEPATITIS B SURFACE ANTIGEN (2) Nonreactive  Nonreactive           

    



             (BEAKER) (test code = 2585)                                        



Specimen is considered negative for HBsAg.HEPATITIS C [id number] 
19:34:00





             Test Item    Value        Reference Range Interpretation Comments

 

             HEPATITIS C ANTIBODY (BEAKER) Nonreactive  Nonreactive             

  



             (test code = 367)                                        



 ID - FSEHEPATITIS B CORE ANTIBODY, [id number] 19:34:00





             Test Item    Value        Reference Range Interpretation Comments

 

             HEPATITIS B CORE TOTAL ANTIBODY Nonreactive  Nonreactive           

    



             (BEAKER) (test code = 497)                                        



 ID - FSEHIV-1 ANTIGEN WITH HIV-1/2 [id number] 19:34:00





             Test Item    Value        Reference Range Interpretation Comments

 

             HIV-1 ANTIGEN WITH HIV 1\T\2 Nonreactive  Nonreactive              

 



             ANTIBODY (2) (BEAKER) (test code                                   

     



             = 2586)                                             



 ID - FSEHEMOGLOBIN [id number] 19:32:00





             Test Item    Value        Reference Range Interpretation Comments

 

             HEMOGLOBIN A1C (BEAKER) (test code = < %          4.3-6.1      L   

         



             368)                                                



MR, ABDOMEN, [id number] 18:32:00Referring: Dr. Qiana LiuUnlisted 
Reason for Exam - Click Yes and Enter Reason Below-&gt;YesUnlisted Reason for 
Exam-&gt;liver transplant evaluation
************************************************************[facility] 1970 Sex: 
M************************************************************FINAL REPORT 
PATIENT ID: [id number] MR, ABDOMEN, WITH \T\ WITHOUT CONTRAST HISTORY: Unlisted 
Reason forExamliver transplant evaluation COMPARISON: Abdominal MRI 2017 
TECHNIQUE: MRI of the abdomen was performed with and without gadolinium. 
Multiplanar, multisequence images were obtained before and following intravenous
 injection of intravenous gadolinium contrast. FINDINGS: Hepatobiliary 
Findings:Contour and signal intensity: Diffusely nodular and heterogeneous. 
Focal treated observations: None. Focal observations satisfying imaging criteria
 for HCC (LI-RADS 5): None. Focal observations at leastmildly suspicious for HCC
 (LI-RADS 4 or 3): None. Other focal observations (LI-RADS 2 or 1):Small focus 
of susceptibility artifact adjacent to the TIPS (postcontrast series image 65), 
benign but of uncertain etiology Portal vein: Patent, including a patent TIPS. 
Dilated main portal vein, 20 mm. Suggestion of mild to moderate stenosis at the 
distal TIPS near the hepatic veinArterial anatomy: Conventional.Gallbladder and 
bile ducts: Gallstones. No biliary ductal dilation or any filling defects in the
 common bile duct. Nonspecific mild apparent gallbladder wall thickening, 
underdistended gallbladder.Spleen: Markedly enlarged, 17.1 cm in long axis. A 
few punctate nonenhancing foci spleen, too small tocharacterize but 
statistically most likely benign.Varices: Small varices inferior to the spleen. 
Small varices in the abdominal wall.Ascites: Moderate ascites. Additional 
Findings:Lung bases: Trace bilateral pleural effusions.Pancreas: 
Unremarkable.Adrenals: Unremarkable.Kidneys and ureters: Unremarkable.Bowel: No 
evidence for bowel obstruction. Lymph nodes: No lymphadenopathy. A 1.1 cm short 
axis lymph node in the upper retroperitoneum adjacent to the pancreatic body, 
unchanged and likely reactiveVessels: Unremarkable.Abdominal wall: 
Unremarkable.Bones: Unremarkable. IMPRESSION: Cirrhosis with sequelae of portal 
hypertension including marked splenomegaly, moderate ascites, a few small 
varices as above, and no suspicious liver lesions. TIPS, suggestion of mild to 
moderate stenosis at the distal TIPS near the hepatic vein, otherwise patent. 
Cholelithiasis. No biliary ductal dilation or filling defects. The gallbladder 
is contracted. Gallbladder wall thickening, nonspecific in the setting of under
distention and chronic liver disease. Signed: Sarina Gamboa Verified 
Date/Time: 2021 18:32:22 Reading Location: [address] Body Reading 
Room Electronically signed by: SARINA GAMBOA MD on 2021 06:32 PM
VITAMIN B12 AND [id number] 18:17:00





             Test Item    Value        Reference Range Interpretation Comments

 

             VITAMIN B12 (BEAKER) (test code = 1215 pg/mL   213-816      H      

      



             774)                                                

 

             FOLATE (BEAKER) (test code = 362) 8.40 ng/mL   >=7.00              

      



 ID - RHONDA [id number] 18:02:00





             Test Item    Value        Reference Range Interpretation Comments

 

             T4 TOTAL (BEAKER) (test code = 895) 5.9 ug/dL    4.9-11.7          

        



 ID - RHONDA [id number] 18:02:00





             Test Item    Value        Reference Range Interpretation Comments

 

             THYROID STIMULATING HORMONE 1.687 uIU/mL 0.350-4.940               



             (BEAKER) (test code = 772)                                        



 ID - RHONDA MCALCIUM, [id number] 17:42:00





             Test Item    Value        Reference Range Interpretation Comments

 

             CALCIUM IONIZED (BEAKER) (test 1.08 mmol/L  1.12-1.27    L         

   



             code = 698)                                         

 

             PH, BLOOD (BEAKER) (test code = 7.45                               

    



             1810)                                               



[id number] 17:41:00





             Test Item    Value        Reference Range Interpretation Comments

 

             TRANSFERRIN (BEAKER) (test code = 106 mg/dL    174-382      L      

      



             541)                                                



 ID - RHONDA MSpecimen moderately ictericIRON, TIBC, % SAT. (WITHOUT 
FERRITIN)2021 17:41:00





             Test Item    Value        Reference Range Interpretation Comments

 

             IRON (BEAKER) (test code = 547) 126.0 ug/dL  40.0-160.0            

    

 

             TOTAL IRON BINDING CAPACITY 133 ug/dL    250-450      L            



             (BEAKER) (test code = 769)                                        

 

             IRON % SATURATION (2) (BEAKER) 95 %         20-55        H         

   



             (test code = 2590)                                        



 ID - RHONDA [id number] 17:39:00





             Test Item    Value        Reference Range Interpretation Comments

 

             ETHANOL (BEAKER) (test code = 400) < mg/dL      <=10               

       



 ID - RHONDA [id number] 17:32:00





             Test Item    Value        Reference Range Interpretation Comments

 

             FIBRINOGEN LEVEL (BEAKER) (test 154 mg/dl    225-434      L        

    



             code = 658)                                         



[id number] 17:26:00





             Test Item    Value        Reference Range Interpretation Comments

 

             PARTIAL THROMBOPLASTIN TIME 42.0 seconds 22.5-36.0    H            



             (BEAKER) (test code = 760)                                        



BLOOD GAS, [id number] 16:01:00





             Test Item    Value        Reference Range Interpretation Comments

 

             PH ARTERIAL (BEAKER) (test code = 7.51         7.35-7.45    H      

      



             383)                                                

 

             PCO2 ARTERIAL (BEAKER) (test code 32 mm Hg     35-45        L      

      



             = 384)                                              

 

             PO2 ARTERIAL (BEAKER) (test code = 74 mm Hg     80-90        L     

       



             385)                                                

 

             O2 SATURATION ARTERIAL (BEAKER) 96.2 %       96.0-97.0             

    



             (test code = 386)                                        

 

             HCO3 ARTERIAL (BEAKER) (test code 25 mmol/L    21-29               

      



             = 388)                                              

 

             BASE EXCESS ARTERIAL (BEAKER) 2.2 mmol/L   -2.0-3.0                

  



             (test code = 387)                                        

 

             PATIENT TEMPERATURE (BEAKER) (test 37.0                            

       



             code = 1818)                                        

 

             FIO2 (BEAKER) (test code = 1819) 21.0                              

     



RAD, MANDIBLE, MIN 4 [id number] 13:58:00Referring: Dr. Qiana Joshi for exam:-&gt;liver transplant evaluation
************************************************************[facility] 1970 Sex: 
M************************************************************FINAL REPORT 
PATIENT ID: [id number] RAD, MANDIBLE, MIN 4 VIEWS INDICATION: liver transplant 
evaluation COMPARISON: None TECHNIQUE: AP and lateral radiographs of the 
mandible FINDINGS/IMPRESSION:Multiple dental fillings. Periapical lucencies. 
Signed: Mela Cotto Verified Date/Time: 113:58:03 Reading 
Location: [facility] Radiology Reading Room Electronically signed by: MLEA COTTO MD on 2021 01:58 PMRAD, CHEST, 2 [id number] 
13:56:00Referring: Dr. Qiana Joshi for exam:-&gt;liver transplant 
evaluation************************************************************[facility] 1970 Sex: 
M************************************************************FINAL REPORT 
PATIENT ID: [id number] INDICATION: liver transplant evaluation COMPARISON: None 
TECHNIQUE:Frontal and lateral views of the chest. FINDINGS: Lungs and pleura: 
Clear lungs. No effusion.Heart and mediastinum: Normal heart size. Unremarkable 
mediastinal contours.Osseous structures: No acute abnormality.Additional 
findings: None. IMPRESSION: No acute intrathoracic abnormality. Signed: 
Mela Cotto MDReport Verified Date/Time: 2021 13:56:08 Reading 
Location: [facility] Radiology Reading Room Electronically signed by: MELA COTTO MD on 2021 01:56 PMLIPID [id number] 13:19:00





             Test Item    Value        Reference Range Interpretation Comments

 

             TRIGLYCERIDES (BEAKER) (test code = 42 mg/dL                       

        



             540)                                                

 

             CHOLESTEROL (BEAKER) (test code = 40 mg/dL                         

      



             631)                                                

 

             HDL CHOLESTEROL (BEAKER) (test code 9 mg/dL                        

        



             = 976)                                              

 

             LDL CHOLESTEROL CALCULATED (BEAKER) 23 mg/dL                       

        



             (test code = 633)                                        



Triglyceride Reference Range: Low Risk &lt;150 Borderline 150-199 High Risk 200-
499 Very High Risk &gt;=500Cholesterol Reference Range: Low Risk &lt;200 
Borderline 200-239 High Risk &gt;240HDL Cholesterol Reference Range: Low Risk 
&gt;=60 High Risk &lt;40LDL Cholesterol Reference Range: Optimal &lt;100 Near 
Optimal 100-129 Borderline 130-159 High 160-189 Very High &gt;=190  ID -
 RHONDA MSpecimen moderately [id number] 13:09:00





             Test Item    Value        Reference Range Interpretation Comments

 

             MAGNESIUM (BEAKER) (test code = 1.9 mg/dL    1.6-2.6               

    



             627)                                                



 ID - RHONDA MURIC [id number] 13:09:00





             Test Item    Value        Reference Range Interpretation Comments

 

             URIC ACID (BEAKER) (test code = 4.0 mg/dL    2.6-7.2               

    



             773)                                                



 ID - RHONDA MSpecimen moderately [id number] 13:09:00





             Test Item    Value        Reference Range Interpretation Comments

 

             PHOSPHORUS (BEAKER) (test code = 3.4 mg/dL    2.3-4.7              

     



             604)                                                



 ID - RHONDA MGAMMA GLUTAMYL TRANSFERASE (GGT)2021 13:09:00





             Test Item    Value        Reference Range Interpretation Comments

 

             GAMMA GLUTAMYL TRANSFERASE (BEAKER) 65 U/L       9-64         H    

        



             (test code = 364)                                        



 ID - RHONDA MSpecimen moderately ictericU/S, [id number] 
09:40:00Referring: Dr. Qiana AikenenderSend ascitic fluid for cell count and 
differential If Cr &gt; 1.5, do not remove more than 3.5L of ascitic fluid. If 
&gt; 3L removed, please administer 200 mL of albumin 25% (50 grams) IV x 1Labs 
to be ordered:-&gt;Cell CountReason for Exam:-&gt;ascites, send fluid for cell 
count with differential
************************************************************[facility] 1970 Sex: 
M************************************************************FINAL REPORT 
PATIENT ID: [id number] Ultrasound guided paracentesis. Clinical History: Ascites. 
Sedation: None. : Denisa Tellez PA-C Assistant: None. Estimated
 Blood Loss: &lt; 1 cc. Specimen: 2800 cc of clear yellow fluid, samples sent to
 laboratory. Technique: Informed consent was obtained. The risks of pain, 
bleeding, infection, bowel perforation, injury to adjacent structures, and adv
erse medication reactions were discussed with the patient. After informed 
consent was obtained, the patient's abdomen was scanned. The right lower 
quadrant of the abdomen was selected for paracentesis.After the largest fluid 
pocket area was marked, and the anterior abdominal wall was evaluated with color
 Doppler to exclude presence of blood vessels traversing the area, the skin was 
prepped and draped in the usual sterile manner. After local anesthesia was 
achieved with 2% lidocaine, a 5 [place] one-step catheter was advanced into the 
peritoneal cavity under ultrasound guidance. After completion of drainage, the 
catheter was removed. There was no evidence of complication. 
Impression:Successful ultrasound guided paracentesis. Signed: Dejah Fitzgerald Verified Date/Time: 2021 09:40:17 Reading Location: [facility] P006J 
Ultrasound Reading Room Electronically signed by: DEJAH FITZGERALD M.D. on 
2021 09:40 AMCOMPREHENSIVE METABOLIC [id number] 06:17:00





             Test Item    Value        Reference Range Interpretation Comments

 

             TOTAL PROTEIN 6.0 gm/dL    6.0-8.3                   



             (BEAKER) (test code =                                        



             770)                                                

 

             ALBUMIN (BEAKER) 1.8 g/dL     3.5-5.0      L            



             (test code = 1145)                                        

 

             ALKALINE PHOSPHATASE 86 U/L                           



             (BEAKER) (test code =                                        



             346)                                                

 

             BILIRUBIN TOTAL 7.2 mg/dL    0.2-1.2      H            



             (BEAKER) (test code =                                        



             377)                                                

 

             SODIUM (BEAKER) (test 130 meq/L    136-145      L            



             code = 381)                                         

 

             POTASSIUM (BEAKER) 3.7 meq/L    3.5-5.1                   



             (test code = 379)                                        

 

             CHLORIDE (BEAKER) 102 meq/L                        



             (test code = 382)                                        

 

             CO2 (BEAKER) (test 24 meq/L     22-29                     



             code = 355)                                         

 

             BLOOD UREA NITROGEN 6 mg/dL      7-21         L            



             (BEAKER) (test code =                                        



             354)                                                

 

             CREATININE (BEAKER) 0.76 mg/dL   0.57-1.25                 



             (test code = 358)                                        

 

             GLUCOSE RANDOM 90 mg/dL                         



             (BEAKER) (test code =                                        



             652)                                                

 

             CALCIUM (BEAKER) 7.3 mg/dL    8.4-10.2     L            



             (test code = 697)                                        

 

             AST (SGOT) (BEAKER) 100 U/L      5-34         H            



             (test code = 353)                                        

 

             ALT (SGPT) (BEAKER) 54 U/L       6-55                      



             (test code = 347)                                        

 

             EGFR (BEAKER) (test 108                                    ESTIMATE

D GFR IS



             code = 1092) mL/min/1.73 sq                           NOT AS ACCURA

TE AS



                          m                                      CREATININE



                                                                 CLEARANCE IN



                                                                 PREDICTING



                                                                 GLOMERULAR



                                                                 FILTRATION RATE

.



                                                                 ESTIMATED GFR I

S



                                                                 NOT APPLICABLE 

FOR



                                                                 DIALYSIS PATIEN

TS.



 ID - RHONDA MSpecimen moderately ictericCBC W/PLT COUNT &amp; AUTO 
[id number] 05:25:00





             Test Item    Value        Reference Range Interpretation Comments

 

             WHITE BLOOD CELL COUNT (BEAKER) 5.1 K/ L     3.5-10.5              

    



             (test code = 775)                                        

 

             RED BLOOD CELL COUNT (BEAKER) 2.98 M/ L    4.63-6.08    L          

  



             (test code = 761)                                        

 

             HEMOGLOBIN (BEAKER) (test code = 10.6 GM/DL   13.7-17.5    L       

     



             410)                                                

 

             HEMATOCRIT (BEAKER) (test code = 31.3 %       40.1-51.0    L       

     



             411)                                                

 

             MEAN CORPUSCULAR VOLUME (BEAKER) 105.0 fL     79.0-92.2    H       

     



             (test code = 753)                                        

 

             MEAN CORPUSCULAR HEMOGLOBIN 35.6 pg      25.7-32.2    H            



             (BEAKER) (test code = 751)                                        

 

             MEAN CORPUSCULAR HEMOGLOBIN CONC 33.9 GM/DL   32.3-36.5            

     



             (BEAKER) (test code = 752)                                        

 

             RED CELL DISTRIBUTION WIDTH 14.5 %       11.6-14.4    H            



             (BEAKER) (test code = 412)                                        

 

             PLATELET COUNT (BEAKER) (test 114 K/CU MM  150-450      L          

  



             code = 756)                                         

 

             MEAN PLATELET VOLUME (BEAKER) 11.9 fL      9.4-12.4                

  



             (test code = 754)                                        

 

             NUCLEATED RED BLOOD CELLS 0 /100 WBC   0-0                       



             (BEAKER) (test code = 413)                                        

 

             NEUTROPHILS RELATIVE PERCENT 55 %                                  

 



             (BEAKER) (test code = 429)                                        

 

             LYMPHOCYTES RELATIVE PERCENT 20 %                                  

 



             (BEAKER) (test code = 430)                                        

 

             MONOCYTES RELATIVE PERCENT 16 %                                   



             (BEAKER) (test code = 431)                                        

 

             EOSINOPHILS RELATIVE PERCENT 8 %                                   

 



             (BEAKER) (test code = 432)                                        

 

             BASOPHILS RELATIVE PERCENT 1 %                                    



             (BEAKER) (test code = 437)                                        

 

             NEUTROPHILS ABSOLUTE COUNT 2.80 K/ L    1.78-5.38                 



             (BEAKER) (test code = 670)                                        

 

             LYMPHOCYTES ABSOLUTE COUNT 1.00 K/ L    1.32-3.57    L            



             (BEAKER) (test code = 414)                                        

 

             MONOCYTES ABSOLUTE COUNT (BEAKER) 0.80 K/ L    0.30-0.82           

      



             (test code = 415)                                        

 

             EOSINOPHILS ABSOLUTE COUNT 0.40 K/ L    0.04-0.54                 



             (BEAKER) (test code = 416)                                        

 

             BASOPHILS ABSOLUTE COUNT (BEAKER) 0.07 K/ L    0.01-0.08           

      



             (test code = 417)                                        

 

             IMMATURE GRANULOCYTES-RELATIVE 1 %          0-1                    

   



             PERCENT (BEAKER) (test code =                                      

  



             2801)                                               



PROTHROMBIN [id number] 05:08:00





             Test Item    Value        Reference Range Interpretation Comments

 

             PROTIME (MONALISA) (test code = 25.0 seconds 11.9-14.2    H          

  



             759)                                                

 

             INR (MONALISA) (test code = 370) 2.33         <=5.90                 

   



Effective 2019: PT Reference Range ChangeNew: 11.9-14.2 Previous: 11.7-
14.7RECOMMENDED COUMADIN/WARFARIN INR THERAPY RANGESSTANDARD DOSE: 2.0-3.0 
Includes: PROPHYLAXIS for venous thrombosis, systemic embolization; TREATMENT 
for venous thrombosis and/or pulmonary embolus.HIGH RISK: Target INR is 2.5-3.5 
for patients wiht mechanical heart valves.U/S, ABDOMINAL, WITH [id number]
 03:09:00Referring: Dr. Qiana LiuAbdomwillis limited area? Add comment if 
clarification is needed.-&gt;LiverReason for exam:-&gt;Cirrhosis, Assess TIPS
************************************************************[facility] : 1970 Sex: 
M************************************************************FINAL REPORT 
PATIENT ID: [id number] U/S, ABDOMINAL, WITH DOPPLER CLINICAL INDICATION: 
Cirrhosis, AssessTIPS COMPARISON: None TECHNIQUE: Abdominal ultrasound was 
performed with Doppler. FINDINGS:Liver: 13.7 cm in length at the right 
midclavicular line. Cirrhotic morphology. No lesion is identified by ultrasound.
 Main portal vein is patent measuring 0.8 cm in diameter and demonstrates 
hepatopetal flow. ATIPS stent is present. Biliary tree: Common duct 3 mm. No 
intrahepatic biliary ductal dilatation. Gallbladder: No shadowing 
calculus/calculi. No wall thickening. No pericholecystic fluid. Negative sonog
raphic Talavera's sign. Pancreas: Largely obscured by bowel gas but the visualized
 portions of the head are grossly unremarkable. Spleen: 14.2 cm in length. 
Ascites: Small perihepatic ascites. Kidneys: Right kidney measures 11 x 6.2 x 
5.4 cm with cortical thickness of 2 cm. Left kidney measures 11.3 x 6.8 x 5.5 cm
 with cortical thickness of 1.8 cm. Normal cortical echogenicity. No mass. No 
shadowing calculus. No hydronephrosis. IVC/Aorta: Segments partially seen. 
Unremarkable. Color and spectral Doppler evaluation of the hepatic vasculature: 
The main portal vein and branches are patent and demonstrate hepatofugal flow. 
The peak systolic velocity in the main portal vein is 19.4 cm/s. The proper, 
right and left hepatic arteries are patent with normal waveforms. The resistive 
indices in the proper, right and left hepatic arteries are 0.7, 0.7 and 0.7 
respectively. The hepatic confluence, main hepaticvein and branches are patent 
with normal waveforms. The splenic vein at the pancreatic head and tailwere not 
seen due to obscuration by bowel gas. The splenic artery and vein at the splenic
 hilum are patent with normal waveforms. The peak systolic velocities in the 
proximal, mid and distal TIPS stentof 30.4 cm/s, 53.9 cm/s and 98.5 cm/s 
respectively. IMPRESSION:Decreased velocities in the proximal and mid TIPS stent
 as well as in the main portal vein. 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 
Venography may be performed for further evaluation as clinically warranted. 
Hepatofugal flow in the mainportal vein in keeping with portal hypertension. 
Cirrhotic liver. Small perihepatic ascites. Splenomegaly. Signed: Marisabel Gandara 
MDReport Verified Date/Time: 2021 03:09:45 Electronically signed by: 
MARISABEL GANDARA MD on 2021 03:09 AMBODY FLUID CELL COUNT WITH 
[id number] 13:51:00





             Test Item    Value        Reference Range Interpretation Comments

 

             APPEARANCE FLUID (BEAKER) (test Hazy         Clear        A        

    



             code = 510)                                         

 

             COLOR FLUID (BEAKER) (test code Yellow       Colorless, Straw A    

        



             = 511)                                              

 

             RBC FLUID (BEAKER) (test code = 620 /cu mm   <=1          H        

    



             513)                                                

 

             ADJUSTED WBC FLUID (BEAKER) 279 /cu mm   <=5          H            



             (test code = 1691)                                        

 

             LINING CELLS (BEAKER) (test 34 /cu mm    <=1          H            



             code = 1590)                                        

 

             NEUTROPHILS FLUID (BEAKER) 12 %                                   



             (test code = 1656)                                        

 

             LYMPHS FLUID (BEAKER) (test 33 %                                   



             code = 488)                                         

 

             MONO/MACROPHAGE FLUID (BEAKER) 55 %                                

   



             (test code = 489)                                        

 

             EOSINOPHILS FLUID (BEAKER) 0 %                                    



             (test code = 491)                                        

 

             BASO FLUID (BEAKER) (test code 0 %                                 

   



             = 492)                                              

 

             CONTAINER BODY FLUID (BEAKER) Sterile Vial                         

  



             (test code = 2873)                                        



ALPHA FETOPROTEIN (AFP), TUMOR [id number] 10:37:00





             Test Item    Value        Reference Range Interpretation Comments

 

             ALPHA-FETOPROTEIN (BEAKER) (test 4.2 ng/mL    <10.0                

     



             code = 1094)                                        



 ID Dre EFREM FHEPATIC FUNCTION [id number] 10:28:00





             Test Item    Value        Reference Range Interpretation Comments

 

             TOTAL PROTEIN (BEAKER) (test code = 7.5 gm/dL    6.0-8.3           

        



             770)                                                

 

             ALBUMIN (BEAKER) (test code = 1145) 2.3 g/dL     3.5-5.0      L    

        

 

             BILIRUBIN TOTAL (BEAKER) (test code 9.1 mg/dL    0.2-1.2      H    

        



             = 377)                                              

 

             BILIRUBIN DIRECT (BEAKER) (test 4.2 mg/dL    0.1-0.5      H        

    



             code = 706)                                         

 

             ALKALINE PHOSPHATASE (BEAKER) (test 115 U/L                  

        



             code = 346)                                         

 

             AST (SGOT) (BEAKER) (test code = 141 U/L      5-34         H       

     



             353)                                                

 

             ALT (SGPT) (BEAKER) (test code = 71 U/L       6-55         H       

     



             347)                                                



 ID Dre TOPETE FSpecimen moderately ictericBASIC METABOLIC PANEL
2021 10:28:00





             Test Item    Value        Reference Range Interpretation Comments

 

             SODIUM (BEAKER) 130 meq/L    136-145      L            



             (test code = 381)                                        

 

             POTASSIUM (BEAKER) 4.1 meq/L    3.5-5.1                   



             (test code = 379)                                        

 

             CHLORIDE (BEAKER) 100 meq/L                        



             (test code = 382)                                        

 

             CO2 (BEAKER) (test 26 meq/L     22-29                     



             code = 355)                                         

 

             BLOOD UREA NITROGEN 7 mg/dL      7-21                      



             (BEAKER) (test code                                        



             = 354)                                              

 

             CREATININE (BEAKER) 0.83 mg/dL   0.57-1.25                 



             (test code = 358)                                        

 

             GLUCOSE RANDOM 101 mg/dL                        



             (BEAKER) (test code                                        



             = 652)                                              

 

             CALCIUM (BEAKER) 7.7 mg/dL    8.4-10.2     L            



             (test code = 697)                                        

 

             EGFR (BEAKER) (test 98 mL/min/1.73                           ESTIMA

SUSI GFR IS



             code = 1092) sq m                                   NOT AS ACCURATE

 AS



                                                                 CREATININE



                                                                 CLEARANCE IN



                                                                 PREDICTING



                                                                 GLOMERULAR



                                                                 FILTRATION RATE

.



                                                                 ESTIMATED GFR I

S



                                                                 NOT APPLICABLE 

FOR



                                                                 DIALYSIS PATIEN

TS.



 ID - EFREM FSpecimen moderately ictericPROTHROMBIN TIME/INR
2021 09:50:00





             Test Item    Value        Reference Range Interpretation Comments

 

             PROTIME (BEAKER) (test code = 22.7 seconds 11.9-14.2    H          

  



             759)                                                

 

             INR (BEAKER) (test code = 370) 2.08         <=5.90                 

   



Effective 2019: PT Reference Range ChangeNew: 11.9-14.2 Previous: 11.7-
14.7RECOMMENDED COUMADIN/WARFARIN INR THERAPY RANGESSTANDARD DOSE: 2.0-3.0 
Includes: PROPHYLAXIS for venous thrombosis, systemic embolization; TREATMENT 
for venous thrombosis and/or pulmonary embolus.HIGH RISK: Target INR is 2.5-3.5 
for patients wiht mechanical heart valves.CBC W/PLT COUNT &amp; AUTO 
[id number] 09:30:00





             Test Item    Value        Reference Range Interpretation Comments

 

             WHITE BLOOD CELL COUNT (BEAKER) 6.0 K/ L     3.5-10.5              

    



             (test code = 775)                                        

 

             RED BLOOD CELL COUNT (BEAKER) 3.63 M/ L    4.63-6.08    L          

  



             (test code = 761)                                        

 

             HEMOGLOBIN (BEAKER) (test code = 13.2 GM/DL   13.7-17.5    L       

     



             410)                                                

 

             HEMATOCRIT (BEAKER) (test code = 38.8 %       40.1-51.0    L       

     



             411)                                                

 

             MEAN CORPUSCULAR VOLUME (BEAKER) 106.9 fL     79.0-92.2    H       

     



             (test code = 753)                                        

 

             MEAN CORPUSCULAR HEMOGLOBIN 36.4 pg      25.7-32.2    H            



             (BEAKER) (test code = 751)                                        

 

             MEAN CORPUSCULAR HEMOGLOBIN CONC 34.0 GM/DL   32.3-36.5            

     



             (BEAKER) (test code = 752)                                        

 

             RED CELL DISTRIBUTION WIDTH 14.6 %       11.6-14.4    H            



             (BEAKER) (test code = 412)                                        

 

             PLATELET COUNT (BEAKER) (test 141 K/CU MM  150-450      L          

  



             code = 756)                                         

 

             MEAN PLATELET VOLUME (BEAKER) 11.4 fL      9.4-12.4                

  



             (test code = 754)                                        

 

             NUCLEATED RED BLOOD CELLS 0 /100 WBC   0-0                       



             (BEAKER) (test code = 413)                                        

 

             NEUTROPHILS RELATIVE PERCENT 53 %                                  

 



             (BEAKER) (test code = 429)                                        

 

             LYMPHOCYTES RELATIVE PERCENT 23 %                                  

 



             (BEAKER) (test code = 430)                                        

 

             MONOCYTES RELATIVE PERCENT 15 %                                   



             (BEAKER) (test code = 431)                                        

 

             EOSINOPHILS RELATIVE PERCENT 7 %                                   

 



             (BEAKER) (test code = 432)                                        

 

             BASOPHILS RELATIVE PERCENT 1 %                                    



             (BEAKER) (test code = 437)                                        

 

             NEUTROPHILS ABSOLUTE COUNT 3.20 K/ L    1.78-5.38                 



             (BEAKER) (test code = 670)                                        

 

             LYMPHOCYTES ABSOLUTE COUNT 1.36 K/ L    1.32-3.57                 



             (BEAKER) (test code = 414)                                        

 

             MONOCYTES ABSOLUTE COUNT (BEAKER) 0.93 K/ L    0.30-0.82    H      

      



             (test code = 415)                                        

 

             EOSINOPHILS ABSOLUTE COUNT 0.40 K/ L    0.04-0.54                 



             (BEAKER) (test code = 416)                                        

 

             BASOPHILS ABSOLUTE COUNT (BEAKER) 0.08 K/ L    0.01-0.08           

      



             (test code = 417)                                        

 

             IMMATURE GRANULOCYTES-RELATIVE 1 %          0-1                    

   



             PERCENT (BEAKER) (test code =                                      

  



             2801)                                               



BLOOD [id number] 17:00:00





             Test Item    Value        Reference Range Interpretation Comments

 

             CULTURE (BEAKER) (test No growth in 5 days                         

  



             code = 1095)                                        



BLOOD [id number] 17:00:00





             Test Item    Value        Reference Range Interpretation Comments

 

             CULTURE (BEAKER) (test No growth in 5 days                         

  



             code = 1095)                                        



COMPREHENSIVE METABOLIC [id number] 07:39:00





             Test Item    Value        Reference Range Interpretation Comments

 

             TOTAL PROTEIN 6.6 gm/dL    6.0-8.3                   Specimen sligh

tly



             (BEAKER) (test code =                                        hemoly

zed



             770)                                                

 

             ALBUMIN (BEAKER) 2.7 g/dL     3.5-5.0      L            Specimen sl

ightly



             (test code = 1145)                                        hemolyzed

 

             ALKALINE PHOSPHATASE 78 U/L                           



             (BEAKER) (test code =                                        



             346)                                                

 

             BILIRUBIN TOTAL 2.6 mg/dL    0.2-1.2      H            Specimen sli

ghtly



             (BEAKER) (test code =                                        hemoly

zed



             377)                                                

 

             SODIUM (BEAKER) (test 134 meq/L    136-145      L            



             code = 381)                                         

 

             POTASSIUM (BEAKER) 4.0 meq/L    3.5-5.1                   Specimen 

slightly



             (test code = 379)                                        hemolyzed

 

             CHLORIDE (BEAKER) 107 meq/L                        



             (test code = 382)                                        

 

             CO2 (BEAKER) (test 21 meq/L     22-29        L            



             code = 355)                                         

 

             BLOOD UREA NITROGEN 9 mg/dL      7-21                      



             (BEAKER) (test code =                                        



             354)                                                

 

             CREATININE (BEAKER) 0.69 mg/dL   0.57-1.25                 Specimen

 slightly



             (test code = 358)                                        hemolyzed

 

             GLUCOSE RANDOM 126 mg/dL           H            



             (BEAKER) (test code =                                        



             652)                                                

 

             CALCIUM (BEAKER) 7.8 mg/dL    8.4-10.2     L            



             (test code = 697)                                        

 

             AST (SGOT) (BEAKER) 52 U/L       5-34         H            Specimen

 slightly



             (test code = 353)                                        hemolyzed

 

             ALT (SGPT) (BEAKER) 24 U/L       6-55                      Specimen

 slightly



             (test code = 347)                                        hemolyzed

 

             EGFR (BEAKER) (test 123                                    ESTIMATE

D GFR IS



             code = 1092) mL/min/1.73 sq                           NOT AS ACCURA

TE AS



                          m                                      CREATININE



                                                                 CLEARANCE IN



                                                                 PREDICTING



                                                                 GLOMERULAR



                                                                 FILTRATION RATE

.



                                                                 ESTIMATED GFR I

S



                                                                 NOT APPLICABLE 

FOR



                                                                 DIALYSIS PATIEN

TS.



Specimen slightly ictericHEMOGLOBIN AND [id number] 06:16:00





             Test Item    Value        Reference Range Interpretation Comments

 

             HEMOGLOBIN (BEAKER) (test code = 8.8 GM/DL    13.7-17.5    L       

     



             410)                                                

 

             HEMATOCRIT (BEAKER) (test code = 28.4 %       40.1-51.0    L       

     



             411)                                                



HEMOGLOBIN AND [id number] 23:04:00





             Test Item    Value        Reference Range Interpretation Comments

 

             HEMOGLOBIN (BEAKER) (test code = 9.6 GM/DL    13.7-17.5    L       

     



             410)                                                

 

             HEMATOCRIT (BEAKER) (test code = 31.6 %       40.1-51.0    L       

     



             411)                                                



ANG, [id number] 17:11:00Referring: Dr. Qiana Joshi for exam:-
&gt;Recurrent GI bleedFINAL REPORT PATIENT ID: [id number] TIPS procedure, 
2017 HISTORY: Variceal bleeding Anesthesia: General Approach: Right 
internal jugular vein Modality: Ultrasound and fluoroscopy, fluoroscopy time:18 
minutes, total dose: 6-0 mGy, reference air kerma method TECHNIQUE: This 
procedure was performed after obtaining written informed consent using all 
elements maximal sterile barrier technique. Ultrasound was used to identify the 
right internal jugular vein was used for access the vein is well. No images were
 saved on PACS. Catheter was advanced in the right hepatic vein and contrast was
 injected confirming patency and appropriate positioning. A coaxial needle 
system was then advanced into the right hepatic vein and directed through the 
liver inferiorly. A vessel was encountered and the sheath partially advanced 
into the vascular system. The vessel entered was a prominent right hepatic 
artery. The sheath was removed from the right hepatic artery and the tract 
between the hepatic artery and the right hepatic vein was embolized with a 
stainless steel coil. A second puncture was then made more medially, entering 
the right portal vein. A guidewire was introduced into the portal vein and 
subsequently in the splenic vein. The tract was initially dilated with a 6 mm 
balloon catheter. An 8 cm in length by 10 mm in diameter Viatorr covered stent 
was deployed and subsequently dilated to 8 mm. Post TIPS placement angiography 
discloses satisfactory appearance with a pet portosystemic gradient of 9 mm. P
ortal vein pressure was 25 mm in the IVC pressure was 16 mm. The catheter was 
removed and neck and hemostasis obtained. CONCLUSION: TIPS placement. Signed: 
Rupa Blanco MDReport Verified Date/Time: 2017 17:11:07 Reading Location:
 [place] [id number] Angio Body Reading Room Electronically signed by: RUPA BLANCO M.D. on 2017 05:11 PMURINE [id number] 14:13:00





             Test Item    Value        Reference Range Interpretation Comments

 

             CULTURE (BEAKER) (test code = 1095) No growth                      

        



CBC W/PLT COUNT &amp; AUTO [id number] 13:29:00





             Test Item    Value        Reference Range Interpretation Comments

 

             WHITE BLOOD CELL COUNT 4.3 K/ L     3.5-10.5                  



             (BEAKER) (test code =                                        



             775)                                                

 

             RED BLOOD CELL COUNT 3.19 M/ L    4.63-6.08    L            



             (BEAKER) (test code =                                        



             761)                                                

 

             HEMOGLOBIN (BEAKER) 7.7 GM/DL    13.7-17.5    L            



             (test code = 410)                                        

 

             HEMATOCRIT (BEAKER) 25.0 %       40.1-51.0    L            



             (test code = 411)                                        

 

             MEAN CORPUSCULAR VOLUME 78.7 fL      79.0-92.2    L            



             (BEAKER) (test code =                                        



             753)                                                

 

             MEAN CORPUSCULAR 24.1 pg      25.7-32.2    L            



             HEMOGLOBIN (BEAKER)                                        



             (test code = 751)                                        

 

             MEAN CORPUSCULAR 30.7 GM/DL   32.3-36.5    L            



             HEMOGLOBIN CONC                                        



             (BEAKER) (test code =                                        



             752)                                                

 

             RED CELL DISTRIBUTION 18.2 %       11.6-14.4    H            



             WIDTH (BEAKER) (test                                        



             code = 412)                                         

 

             PLATELET COUNT (BEAKER) 54 K/CU MM   150-450      L            



             (test code = 756)                                        

 

             MEAN PLATELET VOLUME  fL          9.4-12.4                  Unable 

to report due



             (BEAKER) (test code =                                        to abn

ormal Platelet



             754)                                                population



                                                                 distribution.

 

             NUCLEATED RED BLOOD 0 /100 WBC   0-0                       



             CELLS (BEAKER) (test                                        



             code = 413)                                         

 

             NEUTROPHILS RELATIVE 56 %                                   



             PERCENT (BEAKER) (test                                        



             code = 429)                                         

 

             LYMPHOCYTES RELATIVE 22 %                                   



             PERCENT (BEAKER) (test                                        



             code = 430)                                         

 

             MONOCYTES RELATIVE 11 %                                   



             PERCENT (BEAKER) (test                                        



             code = 431)                                         

 

             EOSINOPHILS RELATIVE 10 %                                   



             PERCENT (BEAKER) (test                                        



             code = 432)                                         

 

             BASOPHILS RELATIVE 1 %                                    



             PERCENT (BEAKER) (test                                        



             code = 437)                                         

 

             NEUTROPHILS ABSOLUTE 2.41 K/ L    1.78-5.38                 



             COUNT (BEAKER) (test                                        



             code = 670)                                         

 

             LYMPHOCYTES ABSOLUTE 0.96 K/ L    1.32-3.57    L            



             COUNT (BEAKER) (test                                        



             code = 414)                                         

 

             MONOCYTES ABSOLUTE 0.47 K/ L    0.30-0.82                 



             COUNT (BEAKER) (test                                        



             code = 415)                                         

 

             EOSINOPHILS ABSOLUTE 0.41 K/ L    0.04-0.54                 



             COUNT (BEAKER) (test                                        



             code = 416)                                         

 

             BASOPHILS ABSOLUTE 0.02 K/ L    0.01-0.08                 



             COUNT (BEAKER) (test                                        



             code = 417)                                         

 

             IMMATURE     0 %          0-1                       



             GRANULOCYTES-RELATIVE                                        



             PERCENT (BEAKER) (test                                        



             code = 2808)                                        



(MANUAL DIFFERENTIAL)2017 13:29:00





             Test Item    Value        Reference Range Interpretation Comments

 

             TOTAL COUNTED (BEAKER) (test code =                                

        



             1351)                                               

 

             WBC MORPHOLOGY (BEAKER) (test code = Normal                        

         



             487)                                                

 

             PLT MORPHOLOGY (BEAKER) (test code = Normal                        

         



             486)                                                

 

             RBC MORPHOLOGY (BEAKER) (test code = Normal                        

         



             762)                                                



PROTHROMBIN [id number] 09:45:00





             Test Item    Value        Reference Range Interpretation Comments

 

             PROTIME (BEAKER) (test code = 17.3 seconds 11.7-14.7    H          

  



             759)                                                

 

             INR (BEAKER) (test code = 370) 1.4          <=5.9                  

   



RECOMMENDED COUMADIN/WARFARIN INR THERAPY RANGESSTANDARD DOSE: 2.0 - 3.0 
Includes: PROPHYLAXIS for venous thrombosis, systemic embolization; TREATMENT 
for venous thrombosis and/or pulmonary embolus.HIGH RISK: Target INR is 2.5-3.5 
for patients with mechanical heart valves.COMPREHENSIVE METABOLIC PANEL
2017 07:07:00





             Test Item    Value        Reference Range Interpretation Comments

 

             TOTAL PROTEIN 6.6 gm/dL    6.0-8.3                   



             (BEAKER) (test code =                                        



             770)                                                

 

             ALBUMIN (BEAKER) 2.8 g/dL     3.5-5.0      L            



             (test code = 1145)                                        

 

             ALKALINE PHOSPHATASE 77 U/L                           



             (BEAKER) (test code =                                        



             346)                                                

 

             BILIRUBIN TOTAL 2.6 mg/dL    0.2-1.2      H            



             (BEAKER) (test code =                                        



             377)                                                

 

             SODIUM (BEAKER) (test 136 meq/L    136-145                   



             code = 381)                                         

 

             POTASSIUM (BEAKER) 3.6 meq/L    3.5-5.1                   



             (test code = 379)                                        

 

             CHLORIDE (BEAKER) 106 meq/L                        



             (test code = 382)                                        

 

             CO2 (BEAKER) (test 23 meq/L     22-29                     



             code = 355)                                         

 

             BLOOD UREA NITROGEN 11 mg/dL     7-21                      



             (BEAKER) (test code =                                        



             354)                                                

 

             CREATININE (BEAKER) 0.82 mg/dL   0.57-1.25                 



             (test code = 358)                                        

 

             GLUCOSE RANDOM 107 mg/dL           H            



             (BEAKER) (test code =                                        



             652)                                                

 

             CALCIUM (BEAKER) 7.9 mg/dL    8.4-10.2     L            



             (test code = 697)                                        

 

             AST (SGOT) (BEAKER) 34 U/L       5-34                      



             (test code = 353)                                        

 

             ALT (SGPT) (BEAKER) 14 U/L       6-55                      



             (test code = 347)                                        

 

             EGFR (BEAKER) (test 101                                    ESTIMATE

D GFR IS



             code = 1092) mL/min/1.73 sq                           NOT AS ACCURA

TE AS



                          m                                      CREATININE



                                                                 CLEARANCE IN



                                                                 PREDICTING



                                                                 GLOMERULAR



                                                                 FILTRATION RATE

.



                                                                 ESTIMATED GFR I

S



                                                                 NOT APPLICABLE 

FOR



                                                                 DIALYSIS PATIEN

TS.



Specimen slightly [id number] 06:58:00





             Test Item    Value        Reference Range Interpretation Comments

 

             PHOSPHORUS (BEAKER) (test code = 3.1 mg/dL    2.3-4.7              

     



             604)                                                



[id number] 06:58:00





             Test Item    Value        Reference Range Interpretation Comments

 

             MAGNESIUM (BEAKER) (test code = 1.8 mg/dL    1.6-2.6               

    



             627)                                                



HEMOGLOBIN AND [id number] 22:27:00





             Test Item    Value        Reference Range Interpretation Comments

 

             HEMOGLOBIN (BEAKER) (test code = 7.3 GM/DL    13.7-17.5    L       

     



             410)                                                

 

             HEMATOCRIT (BEAKER) (test code = 23.9 %       40.1-51.0    L       

     



             411)                                                



HEMOGLOBIN AND [id number] 13:19:00





             Test Item    Value        Reference Range Interpretation Comments

 

             HEMOGLOBIN (BEAKER) (test code = 7.5 GM/DL    13.7-17.5    L       

     



             410)                                                

 

             HEMATOCRIT (BEAKER) (test code = 24.8 %       40.1-51.0    L       

     



             411)                                                



POCT-GLUCOSE [id number] 12:25:00





             Test Item    Value        Reference Range Interpretation Comments

 

             POC-GLUCOSE METER 128 mg/dL           H            TESTED AT 

[facility] [address]



             (BEAKER) (test code =                                        [place] [place]



             7648)                                               [id number]



MR, ABDOMEN, [id number] 11:54:00Referring: Dr. Qiana Pollack PROTOCOL
FINAL REPORT PATIENT ID: [id number] INDICATION:47-year-old male with cirrhosis. 
Surveillance for hepatocellular carcinoma. COMPARISON: Abdomen pelvis CT exam 
2016 TECHNIQUE: MR of the AbdomenWITHOUT and WITH intravenous 
contrast. FINDINGS: Irregular contour of the liver is in keeping with cirrhosis.
 No significant lobar hypertrophy or atrophy is demonstrated. Diffuse mild 
heterogeneous signal of the liver parenchyma is noted. No suspicious enhancing 
liver mass is demonstrated. There is nothrombosis in the portal vein, superior 
mesenteric vein, or splenic vein. Main portal vein is mildlydilated measuring 
1.3 cm in diameter. Spleen is mildly enlarged measuring 14 cm in sagittal 
diameter. There is no ascites or upper abdominal lymphadenopathy. No significant
 varices are demonstrated. Gallbladder is notable for a few small stones. There 
is no biliary ductal dilatation. Pancreas, adrenalglands, kidneys, and 
visualized bowel loops are unremarkable. Partial imaging of the lower thorax is
unremarkable. No suspicious marrow signal abnormality demonstrated. 
IMPRESSION:Cirrhosis without evidence of hepatocellular carcinoma. No portal 
vein, superior mesenteric vein, or splenic vein thrombosis. Mildly dilated main 
portal vein and mild prostatomegaly. No ascites. Cholelithiasis. Signed: Rommel Mendoza MDReport Verified Date/Time: 2017 11:54:46 Reading Location: 
[address] Reading Room Electronically signed by: ROMMEL YAP M.D. on 2017 11:54 [id number] 06:12:00





             Test Item    Value        Reference Range Interpretation Comments

 

             PHOSPHORUS (BEAKER) (test code = 2.0 mg/dL    2.3-4.7      L       

     



             604)                                                



[id number] 06:12:00





             Test Item    Value        Reference Range Interpretation Comments

 

             MAGNESIUM (BEAKER) (test code = 1.7 mg/dL    1.6-2.6               

    



             627)                                                



COMPREHENSIVE METABOLIC [id number] 06:12:00





             Test Item    Value        Reference Range Interpretation Comments

 

             TOTAL PROTEIN 6.5 gm/dL    6.0-8.3                   



             (BEAKER) (test code =                                        



             770)                                                

 

             ALBUMIN (BEAKER) 2.8 g/dL     3.5-5.0      L            



             (test code = 1145)                                        

 

             ALKALINE PHOSPHATASE 79 U/L                           



             (BEAKER) (test code =                                        



             346)                                                

 

             BILIRUBIN TOTAL 3.0 mg/dL    0.2-1.2      H            



             (BEAKER) (test code =                                        



             377)                                                

 

             SODIUM (BEAKER) (test 139 meq/L    136-145                   



             code = 381)                                         

 

             POTASSIUM (BEAKER) 3.7 meq/L    3.5-5.1                   



             (test code = 379)                                        

 

             CHLORIDE (BEAKER) 109 meq/L           H            



             (test code = 382)                                        

 

             CO2 (BEAKER) (test 26 meq/L     22-29                     



             code = 355)                                         

 

             BLOOD UREA NITROGEN 19 mg/dL     7-21                      



             (BEAKER) (test code =                                        



             354)                                                

 

             CREATININE (BEAKER) 0.88 mg/dL   0.57-1.25                 



             (test code = 358)                                        

 

             GLUCOSE RANDOM 113 mg/dL           H            



             (BEAKER) (test code =                                        



             652)                                                

 

             CALCIUM (BEAKER) 8.0 mg/dL    8.4-10.2     L            



             (test code = 697)                                        

 

             AST (SGOT) (BEAKER) 36 U/L       5-34         H            



             (test code = 353)                                        

 

             ALT (SGPT) (BEAKER) 16 U/L       6-55                      



             (test code = 347)                                        

 

             EGFR (BEAKER) (test 93 mL/min/1.73                           ESTIMA

SUSI GFR IS



             code = 1092) sq m                                   NOT AS ACCURATE

 AS



                                                                 CREATININE



                                                                 CLEARANCE IN



                                                                 PREDICTING



                                                                 GLOMERULAR



                                                                 FILTRATION RATE

.



                                                                 ESTIMATED GFR I

S



                                                                 NOT APPLICABLE 

FOR



                                                                 DIALYSIS PATIEN

TS.



Specimen slightly ictericCBC W/PLT COUNT &amp; AUTO [id number] 
05:43:00





             Test Item    Value        Reference Range Interpretation Comments

 

             WHITE BLOOD CELL COUNT 3.8 K/ L     3.5-10.5                  



             (BEAKER) (test code =                                        



             775)                                                

 

             RED BLOOD CELL COUNT 3.19 M/ L    4.63-6.08    L            



             (BEAKER) (test code =                                        



             761)                                                

 

             HEMOGLOBIN (BEAKER) 7.6 GM/DL    13.7-17.5    L            



             (test code = 410)                                        

 

             HEMATOCRIT (BEAKER) 24.9 %       40.1-51.0    L            



             (test code = 411)                                        

 

             MEAN CORPUSCULAR VOLUME 78.1 fL      79.0-92.2    L            



             (BEAKER) (test code =                                        



             753)                                                

 

             MEAN CORPUSCULAR 23.8 pg      25.7-32.2    L            



             HEMOGLOBIN (BEAKER)                                        



             (test code = 751)                                        

 

             MEAN CORPUSCULAR 30.5 GM/DL   32.3-36.5    L            



             HEMOGLOBIN CONC                                        



             (BEAKER) (test code =                                        



             752)                                                

 

             RED CELL DISTRIBUTION 17.7 %       11.6-14.4    H            



             WIDTH (BEAKER) (test                                        



             code = 412)                                         

 

             PLATELET COUNT (BEAKER) 61 K/CU MM   150-450      L            



             (test code = 756)                                        

 

             MEAN PLATELET VOLUME  fL          9.4-12.4                  Unable 

to report due



             (BEAKER) (test code =                                        to abn

ormal Platelet



             754)                                                population



                                                                 distribution.

 

             NUCLEATED RED BLOOD 0 /100 WBC   0-0                       



             CELLS (BEAKER) (test                                        



             code = 413)                                         

 

             NEUTROPHILS RELATIVE 59 %                                   



             PERCENT (BEAKER) (test                                        



             code = 429)                                         

 

             LYMPHOCYTES RELATIVE 22 %                                   



             PERCENT (BEAKER) (test                                        



             code = 430)                                         

 

             MONOCYTES RELATIVE 11 %                                   



             PERCENT (BEAKER) (test                                        



             code = 431)                                         

 

             EOSINOPHILS RELATIVE 7 %                                    



             PERCENT (BEAKER) (test                                        



             code = 432)                                         

 

             BASOPHILS RELATIVE 1 %                                    



             PERCENT (BEAKER) (test                                        



             code = 437)                                         

 

             NEUTROPHILS ABSOLUTE 2.26 K/ L    1.78-5.38                 



             COUNT (BEAKER) (test                                        



             code = 670)                                         

 

             LYMPHOCYTES ABSOLUTE 0.83 K/ L    1.32-3.57    L            



             COUNT (BEAKER) (test                                        



             code = 414)                                         

 

             MONOCYTES ABSOLUTE 0.42 K/ L    0.30-0.82                 



             COUNT (BEAKER) (test                                        



             code = 415)                                         

 

             EOSINOPHILS ABSOLUTE 0.27 K/ L    0.04-0.54                 



             COUNT (BEAKER) (test                                        



             code = 416)                                         

 

             BASOPHILS ABSOLUTE 0.03 K/ L    0.01-0.08                 



             COUNT (BEAKER) (test                                        



             code = 417)                                         

 

             IMMATURE     1 %          0-1                       



             GRANULOCYTES-RELATIVE                                        



             PERCENT (BEAKER) (test                                        



             code = 2801)                                        



HEMOGLOBIN AND [id number] 05:40:00





             Test Item    Value        Reference Range Interpretation Comments

 

             HEMOGLOBIN (BEAKER) (test code = 7.6 GM/DL    13.7-17.5    L       

     



             410)                                                

 

             HEMATOCRIT (BEAKER) (test code = 24.9 %       40.1-51.0    L       

     



             411)                                                



POCT-GLUCOSE [id number] 00:46:00





             Test Item    Value        Reference Range Interpretation Comments

 

             POC-GLUCOSE METER 122 mg/dL           H            TESTED AT 

[facility] [address]



             (BEAKER) (test code =                                        [place]



             [address])                                               [address]



HEMOGLOBIN AND [id number] 00:12:00





             Test Item    Value        Reference Range Interpretation Comments

 

             HEMOGLOBIN (BEAKER) (test code = 6.6 GM/DL    13.7-17.5    L       

     



             410)                                                

 

             HEMATOCRIT (BEAKER) (test code = 21.6 %       40.1-51.0    L       

     



             411)                                                



URINALYSIS W/ [id number] 19:27:00





             Test Item    Value        Reference Range Interpretation Comments

 

             COLOR (BEAKER) (test code = Yellow                                 



             470)                                                

 

             CLARITY (BEAKER) (test code = Clear                                

  



             469)                                                

 

             SPECIFIC GRAVITY UA (BEAKER) 1.012        1.001-1.035              

 



             (test code = 468)                                        

 

             PH UA (BEAKER) (test code = 7.5          5.0-8.0                   



             467)                                                

 

             PROTEIN UA (BEAKER) (test code Negative     Negative               

   



             = 464)                                              

 

             GLUCOSE UA (BEAKER) (test code Negative     Negative               

   



             = 365)                                              

 

             KETONES UA (BEAKER) (test code Trace        Negative     A         

   



             = 371)                                              

 

             BILIRUBIN UA (BEAKER) (test Negative     Negative                  



             code = 462)                                         

 

             BLOOD UA (BEAKER) (test code = Negative     Negative               

   



             461)                                                

 

             NITRITE UA (BEAKER) (test code Negative     Negative               

   



             = 465)                                              

 

             LEUKOCYTE ESTERASE UA (BEAKER) Negative     Negative               

   



             (test code = 466)                                        

 

             UROBILINOGEN UA (BEAKER) (test 0.2 mg/dL    0.2-1.0                

   



             code = 463)                                         

 

             RBC UA (BEAKER) (test code = < /HPF                                

 



             519)                                                

 

             WBC UA (BEAKER) (test code = 0 /HPF                                

 



             520)                                                

 

             SOURCE(BEAKER) (test code = Urine, Voided                          

 



             3116)                                               



POCT-GLUCOSE [id number] 18:35:00





             Test Item    Value        Reference Range Interpretation Comments

 

             POC-GLUCOSE METER 191 mg/dL           H            TESTED AT 

[facility] [address]



             (BEAKER) (test code =                                        [place]



             1538)                                               [address]



BILIRUBIN, [id number] 18:14:00





             Test Item    Value        Reference Range Interpretation Comments

 

             BILIRUBIN DIRECT (BEAKER) (test 0.6 mg/dL    0.1-0.5      H        

    



             code = 706)                                         



HEMOGLOBIN AND [id number] 17:41:00





             Test Item    Value        Reference Range Interpretation Comments

 

             HEMOGLOBIN (BEAKER) (test code = 7.0 GM/DL    13.7-17.5    L       

     



             410)                                                

 

             HEMATOCRIT (BEAKER) (test code = 22.9 %       40.1-51.0    L       

     



             411)                                                



T4, [id number] 13:51:00





             Test Item    Value        Reference Range Interpretation Comments

 

             FREE T4 (BEAKER) (test code = 655) 0.86 ng/dL   0.70-1.48          

       



[id number] 13:51:00





             Test Item    Value        Reference Range Interpretation Comments

 

             THYROID STIMULATING HORMONE 0.36 uIU/mL  0.35-4.94                 



             (BEAKER) (test code = 772)                                        



HEPATIC FUNCTION [id number] 13:26:00





             Test Item    Value        Reference Range Interpretation Comments

 

             TOTAL PROTEIN (BEAKER) (test code = 7.2 gm/dL    6.0-8.3           

        



             770)                                                

 

             ALBUMIN (BEAKER) (test code = 1145) 3.1 g/dL     3.5-5.0      L    

        

 

             BILIRUBIN TOTAL (BEAKER) (test code 3.1 mg/dL    0.2-1.2      H    

        



             = 377)                                              

 

             BILIRUBIN DIRECT (BEAKER) (test 0.6 mg/dL    0.1-0.5      H        

    



             code = 706)                                         

 

             ALKALINE PHOSPHATASE (BEAKER) (test 87 U/L                   

        



             code = 346)                                         

 

             AST (SGOT) (BEAKER) (test code = 32 U/L       5-34                 

     



             353)                                                

 

             ALT (SGPT) (BEAKER) (test code = 18 U/L       6-55                 

     



             347)                                                



Specimen slightly ictericHEMOGLOBIN AND [id number] 12:29:00





             Test Item    Value        Reference Range Interpretation Comments

 

             HEMOGLOBIN (BEAKER) (test code = 7.2 GM/DL    13.7-17.5    L       

     



             410)                                                

 

             HEMATOCRIT (BEAKER) (test code = 23.8 %       40.1-51.0    L       

     



             411)                                                



BASIC METABOLIC [id number] 12:03:00





             Test Item    Value        Reference Range Interpretation Comments

 

             SODIUM (BEAKER) 140 meq/L    136-145                   



             (test code = 381)                                        

 

             POTASSIUM (BEAKER) 4.7 meq/L    3.5-5.1                   



             (test code = 379)                                        

 

             CHLORIDE (BEAKER) 108 meq/L           H            



             (test code = 382)                                        

 

             CO2 (BEAKER) (test 24 meq/L     22-29                     



             code = 355)                                         

 

             BLOOD UREA NITROGEN 22 mg/dL     7-21         H            



             (BEAKER) (test code                                        



             = 354)                                              

 

             CREATININE (BEAKER) 0.78 mg/dL   0.57-1.25                 



             (test code = 358)                                        

 

             GLUCOSE RANDOM 155 mg/dL           H            



             (BEAKER) (test code                                        



             = 652)                                              

 

             CALCIUM (BEAKER) 8.1 mg/dL    8.4-10.2     L            



             (test code = 697)                                        

 

             EGFR (BEAKER) (test 107 mL/min/1.73                           ESTIM

ATED GFR IS



             code = 1092) sq m                                   NOT AS ACCURATE

 AS



                                                                 CREATININE



                                                                 CLEARANCE IN



                                                                 PREDICTING



                                                                 GLOMERULAR



                                                                 FILTRATION RATE

.



                                                                 ESTIMATED GFR I

S



                                                                 NOT APPLICABLE 

FOR



                                                                 DIALYSIS PATIEN

TS.



Specimen slightly ictericRAD, CHEST, 1 VIEW, NON [id number] 11:40:00
Referring: Dr. Qiana MillenderReason for exam:-&gt;pulmonary insufficiencyShould
 this be performed at the bedside?-&gt;YesFINAL REPORT PATIENT ID: [id number] 
Chest one view AP 2017 11:40 AM CLINICAL HISTORY: pulmonary insufficiency 
COMPARISON: None available FINDINGS: The lungs are clear. Cardiomediastinal 
contours are within normal limits. The central pulmonary vasculature is not 
engorged. IMPRESSION: Unremarkable frontal chest radiograph. Signed: Seipel, Timothy MDReport Verified Date/Time: 2017 11:40:03 Reading Location: [facility] Radiology Reading Room Electronically signed by: TIMOTHY J SEIPEL, M.D. on 2017 11:40 AMCBC W/PLT COUNT &amp; AUTO [id number] 
10:54:00





             Test Item    Value        Reference Range Interpretation Comments

 

             WHITE BLOOD CELL COUNT 6.1 K/ L     3.5-10.5                  



             (BEAKER) (test code =                                        



             775)                                                

 

             RED BLOOD CELL COUNT 3.06 M/ L    4.63-6.08    L            



             (BEAKER) (test code =                                        



             761)                                                

 

             HEMOGLOBIN (BEAKER) 7.2 GM/DL    13.7-17.5    L            



             (test code = 410)                                        

 

             HEMATOCRIT (BEAKER) 23.7 %       40.1-51.0    L            



             (test code = 411)                                        

 

             MEAN CORPUSCULAR VOLUME 77.5 fL      79.0-92.2    L            



             (BEAKER) (test code =                                        



             753)                                                

 

             MEAN CORPUSCULAR 23.5 pg      25.7-32.2    L            



             HEMOGLOBIN (BEAKER)                                        



             (test code = 751)                                        

 

             MEAN CORPUSCULAR 30.4 GM/DL   32.3-36.5    L            



             HEMOGLOBIN CONC                                        



             (BEAKER) (test code =                                        



             752)                                                

 

             RED CELL DISTRIBUTION 18.6 %       11.6-14.4    H            



             WIDTH (BEAKER) (test                                        



             code = 412)                                         

 

             PLATELET COUNT (BEAKER) 69 K/CU MM   150-450      L            



             (test code = 756)                                        

 

             MEAN PLATELET VOLUME  fL          9.4-12.4                  Unable 

to report due



             (BEAKER) (test code =                                        to abn

ormal Platelet



             754)                                                population



                                                                 distribution.

 

             NUCLEATED RED BLOOD 0 /100 WBC   0-0                       



             CELLS (BEAKER) (test                                        



             code = 413)                                         

 

             NEUTROPHILS RELATIVE 70 %                                   



             PERCENT (BEAKER) (test                                        



             code = 429)                                         

 

             LYMPHOCYTES RELATIVE 16 %                                   



             PERCENT (BEAKER) (test                                        



             code = 430)                                         

 

             MONOCYTES RELATIVE 13 %                                   



             PERCENT (BEAKER) (test                                        



             code = 431)                                         

 

             EOSINOPHILS RELATIVE 0 %                                    



             PERCENT (BEAKER) (test                                        



             code = 432)                                         

 

             BASOPHILS RELATIVE 1 %                                    



             PERCENT (BEAKER) (test                                        



             code = 437)                                         

 

             NEUTROPHILS ABSOLUTE 4.25 K/ L    1.78-5.38                 



             COUNT (BEAKER) (test                                        



             code = 670)                                         

 

             LYMPHOCYTES ABSOLUTE 0.98 K/ L    1.32-3.57    L            



             COUNT (BEAKER) (test                                        



             code = 414)                                         

 

             MONOCYTES ABSOLUTE 0.79 K/ L    0.30-0.82                 



             COUNT (BEAKER) (test                                        



             code = 415)                                         

 

             EOSINOPHILS ABSOLUTE 0.00 K/ L    0.04-0.54    L            



             COUNT (BEAKER) (test                                        



             code = 416)                                         

 

             BASOPHILS ABSOLUTE 0.04 K/ L    0.01-0.08                 



             COUNT (BEAKER) (test                                        



             code = 417)                                         

 

             IMMATURE     1 %          0-1                       



             GRANULOCYTES-RELATIVE                                        



             PERCENT (BEAKER) (test                                        



             code = 2801)                                        



PROTHROMBIN TIME/[id number] 10:50:00





             Test Item    Value        Reference Range Interpretation Comments

 

             PROTIME (BEAKER) (test code = 17.5 seconds 11.7-14.7    H          

  



             759)                                                

 

             INR (BEAKER) (test code = 370) 1.4          <=5.9                  

   



RECOMMENDED COUMADIN/WARFARIN INR THERAPY RANGESSTANDARD DOSE: 2.0 - 3.0 
Includes: PROPHYLAXIS for venous thrombosis, systemic embolization; TREATMENT 
for venous thrombosis and/or pulmonary embolus.HIGH RISK: Target INR is 2.5-3.5 
for patients with mechanical heart valves.

## 2022-08-02 NOTE — RAD REPORT
EXAM DESCRIPTION:  RAD - Chest Single View - 8/2/2022 9:43 am

 

CLINICAL HISTORY:  COUGH

 

COMPARISON:  Portable 11/22/2017

 

TECHNIQUE:  AP portable chest image was obtained 8/2/2022 9:43 am .

 

FINDINGS:  Lung volumes are low accentuating the baseline interstitial pattern. No peripheral mass or
 consolidation. No failure or volume overload. Heart and vasculature are normal. No measurable pleura
l effusion and no pneumothorax. No acute bony abnormality seen. No acute aortic findings suspected.

 

IMPRESSION:  No acute cardiopulmonary process.

## 2022-08-03 NOTE — EDPHYS
Physician Documentation                                                                           

 [facility]                                                                 

Name: Emanuel Burch                                                                             

Age: 52 yrs                                                                                       

Sex: Male                                                                                         

: 1970                                                                                   

MRN: [id number]                                                                                   

Arrival Date: 2022                                                                          

Time: 08:49                                                                                       

Account#: [id number]                                                                            

Bed 8                                                                                             

Private MD:                                                                                       

ED Physician Kamron Weiss                                                                      

HPI:                                                                                              

                                                                                             

10:53 This 52 yrs old  Male presents to ER via Ambulatory with complaints of          debbie 

      Abdominal Swelling.                                                                         

10:53 The patient presents with abdominal pain abdominal distention. Onset: The               debbie 

      symptoms/episode began/occurred 3 day(s) ago. The symptoms do not radiate. Associated       

      signs and symptoms: Pertinent positives: anorexia, shortness of breath. The symptoms        

      are described as crampy. Modifying factors: The symptoms are alleviated by nothing, the     

      symptoms are aggravated by nothing. Severity of pain: At its worst the pain was             

      moderate in the emergency department the pain is unchanged. The patient has experienced     

      similar episodes in the past.                                                               

                                                                                                  

Historical:                                                                                       

- Allergies:                                                                                      

09:05 No Known Allergies;                                                                     tw2 

- Home Meds:                                                                                      

09:05 Lactulose 30mg Oral 3 times per day [Active];                                           tw2 

11:42 rifaximin 550 mg oral tab 1 tab 2 times per day [Active]; thiamine HCl (vitamin B1) 100 bp  

      mg Oral tab 100 mg daily [Active]; ergocalciferol (vitamin D2) 1,250 mcg (50,000 unit)      

      oral cap [Active]; Folvite-D 94 mcg- 1 mg oral tab 1 tab once daily [Active];               

- PMHx:                                                                                           

09:05 Cirrhosis; esophageal varices;                                                          tw2 

                                                                                                  

- Immunization history:: Client reports receiving the 2nd dose of the Covid vaccine.              

- Social history:: Smoking status: Patient denies any tobacco usage or history of.                

- Family history:: not pertinent.                                                                 

                                                                                                  

                                                                                                  

ROS:                                                                                              

10:53 Constitutional: Negative for fever, chills, and weight loss, Eyes: Negative for injury, debbie 

      pain, redness, and discharge, ENT: Negative for injury, pain, and discharge, Neck:          

      Negative for injury, pain, and swelling, Cardiovascular: Negative for chest pain,           

      palpitations, and edema, Respiratory: Negative for shortness of breath, cough,              

      wheezing, and pleuritic chest pain, Back: Negative for injury and pain, : Negative        

      for injury, bleeding, discharge, and swelling, Skin: Negative for injury, rash, and         

      discoloration, Neuro: Negative for headache, weakness, numbness, tingling, and seizure.     

10:53 Abdomen/GI: Positive for abdominal pain, of the right upper quadrant, left upper            

      quadrant, right lower quadrant and left lower quadrant.                                     

10:53 MS/extremity: Positive for swelling.                                                        

                                                                                                  

Exam:                                                                                             

10:53 Constitutional:  This is a well developed, well nourished patient who is awake, alert,  debbie 

      and in no acute distress. Head/Face:  Normocephalic, atraumatic. ENT:  Nares patent. No     

      nasal discharge, no septal abnormalities noted.  Tympanic membranes are normal and          

      external auditory canals are clear.  Oropharynx with no redness, swelling, or masses,       

      exudates, or evidence of obstruction, uvula midline.  Mucous membranes moist. Neck:         

      Trachea midline, no thyromegaly or masses palpated, and no cervical lymphadenopathy.        

      Supple, full range of motion without nuchal rigidity, or vertebral point tenderness.        

      No Meningismus. Chest/axilla:  Normal chest wall appearance and motion.  Nontender with     

      no deformity.  No lesions are appreciated. Cardiovascular:  Regular rate and rhythm         

      with a normal S1 and S2.  No gallops, murmurs, or rubs.  Normal PMI, no JVD.  No pulse      

      deficits. Respiratory:  Lungs have equal breath sounds bilaterally, clear to                

      auscultation and percussion.  No rales, rhonchi or wheezes noted.  No increased work of     

      breathing, no retractions or nasal flaring. Back:  No spinal tenderness.  No                

      costovertebral tenderness.  Full range of motion. Male :  Normal genitalia with no        

      discharge or lesions. Skin:  Warm, dry with normal turgor.  Normal color with no            

      rashes, no lesions, and no evidence of cellulitis. Neuro:  Awake and alert, GCS 15,         

      oriented to person, place, time, and situation.  Cranial nerves II-XII grossly intact.      

      Motor strength 5/5 in all extremities.  Sensory grossly intact.  Cerebellar exam            

      normal.  Normal gait. Psych:  Awake, alert, with orientation to person, place and time.     

       Behavior, mood, and affect are within normal limits.                                       

10:53 Abdomen/GI: Inspection: distension.                                                         

10:53 Musculoskeletal/extremity: Extremities: noted in the right leg and left leg: decreased      

      ROM, swelling, ROM: intact in all extremities, Circulation is intact in all                 

      extremities. Sensation intact. Compartment Syndrome exam of affected extremity: is          

      normal. DVT Exam: swelling.                                                                 

11:05 ECG was reviewed by the Attending Physician.                                            [facility] 

                                                                                                  

Vital Signs:                                                                                      

09:03  / 78; Pulse 104; Resp 20; Temp 98.6(TE); Pulse Ox 100% on R/A; Weight 86.18 kg   tw2 

      (R); Height 5 ft. 8 in. (172.72 cm); Pain 0/10;                                             

09:38  / 71; Pulse 97; Resp 17; Pulse Ox 96% on R/A;                                    tw2 

10:23  / 72; Pulse 98; Resp 20; Pulse Ox 100% on R/A;                                   tw2 

11:10  / 74; Pulse 94; Resp 19; Pulse Ox 100% on R/A;                                   tw2 

11:54  / 74; Pulse 112; Resp 20; Pulse Ox 100% on R/A;                                  tw2 

09:03 Body Mass Index 28.89 (86.18 kg, 172.72 cm)                                             tw2 

                                                                                                  

MDM:                                                                                              

08:55 Patient medically screened.                                                             [facility] 

10:59 Differential diagnosis: bowel obstruction, gastritis, pancreatitis, Pyelonephritis.     [facility] 

      Data reviewed: vital signs, nurses notes, lab test result(s), EKG, radiologic studies.      

      Data interpreted: Cardiac monitor: rate is 98 beats/min, rhythm is regular, Pulse           

      oximetry: on room air is 100 %. Test interpretation: by ED physician or midlevel            

      provider: ECG, plain radiologic studies. Counseling: I had a detailed discussion with       

      the patient and/or guardian regarding: the historical points, exam findings, and any        

      diagnostic results supporting the discharge/admit diagnosis, lab results, radiology         

      results, the need to transfer to another facility, for higher level of care, [facility] does not immediately have the required specialist.                        

12:00 ED course: pt refuses transfer despite all risk of worsening, possible death, want to   [facility] 

      be dc'd home to follow up on own, i discussed all risk and my opposition to this plan.      

                                                                                                  

                                                                                             

09:07 Order name: Basic Metabolic Panel; Complete Time: 10:24                                 [facility] 

                                                                                             

09:07 Order name: CBC with Diff                                                               [facility] 

                                                                                             

09:07 Order name: LFT's; Complete Time: 10:24                                                 [facility] 

                                                                                             

09:07 Order name: Magnesium; Complete Time: 10:24                                             [facility] 

                                                                                             

09:07 Order name: NT PRO-BNP; Complete Time: 10:24                                            [facility] 

                                                                                             

09:07 Order name: PT-INR; Complete Time: 10:24                                                [facility] 

                                                                                             

09:07 Order name: Troponin HS; Complete Time: 10:24                                           [facility] 

                                                                                             

10:25 Order name: AMMONIA; Complete Time: 11:05                                               [facility] 

                                                                                             

10:25 Order name: AMMONIA                                                                       

                                                                                             

10:35 Order name: Type And Screen                                                             [facility] 

                                                                                             

11:04 Order name: Bb Add On                                                                     

                                                                                             

11:07 Order name: Fresh Frozen Plasma                                                         [facility]

                                                                                             

11:29 Order name: SARS RAPID                                                                  bd  

                                                                                             

09:07 Order name: XRAY Chest (1 view); Complete Time: 10:24                                   [facility] 

                                                                                             

09:07 Order name: EKG; Complete Time: 09:10                                                   [facility] 

                                                                                             

09:07 Order name: Cardiac monitoring; Complete Time: 09:12                                    [facility] 

                                                                                             

09:07 Order name: EKG - Nurse/Tech; Complete Time: 09:12                                      [facility] 

                                                                                             

09:07 Order name: IV Saline Lock; Complete Time: 09:22                                        [facility] 

                                                                                             

09:07 Order name: Labs collected and sent; Complete Time: 09:22                               [facility] 

                                                                                             

09:07 Order name: O2 Per Protocol; Complete Time: 09:12                                       [facility] 

                                                                                             

09:07 Order name: O2 Sat Monitoring; Complete Time: 09:12                                     [facility] 

                                                                                             

09:07 Order name: IV Saline Lock - Large Bore; Complete Time: 09:19                           [facility] 

                                                                                             

12:15 Order name: CBC Smear Scan                                                              [facility]

                                                                                             

09:31 Order name: Labs - recollect needed: recollect green,lavender, blue top; Complete Time: bd  

      09:38                                                                                       

                                                                                                  

EC:05 Rate is 101 beats/min. Rhythm is regular. QRS Axis is Normal. MI interval is normal.    [facility] 

      QRS interval is normal. QT interval is normal. No Q waves. T waves are Flattened. No ST     

      changes noted. Clinical impression: Sinus tachycardia and No evidence of ischemia.          

      Interpreted by me.                                                                          

                                                                                                  

Administered Medications:                                                                         

10:54 Drug: Albumin 25 grams Volume: 100 ml; Route: IVPB; Site: left antecubital;             tw2 

11:41 Follow up: IV Status: Completed infusion; IV Intake: 100ml                              bp  

10:56 Drug: Vitamin K1 (phytonadione) 10 mg Route: Sub-Q; Site: left upper arm;               tw2 

11:15 Drug: ProTONIX (pantoprazole) 40 mg Route: IVP; Site: right antecubital;                bp  

11:15 Drug: Lasix (furosemide) 20 mg Route: IVP; Site: right antecubital;                     bp  

11:15 Drug: Spironolactone 50 mg Route: PO;                                                   bp  

11:15 Drug: Lactulose 30 grams Volume: 45 ml; Route: PO;                                      bp  

                                                                                                  

                                                                                                  

Disposition Summary:                                                                              

22 12:06                                                                                    

Discharge Ordered                                                                                 

      Location: Home                                                                          debbie 

      Problem: an acute exacerbation(22 12:06)                                          debbie 

      Symptoms: are unchanged(22 12:06)                                                 debbie 

      Condition: Fair(22 12:06)                                                         debbie 

      Diagnosis                                                                                   

        - Acute and subacute hepatic failure(22 12:06)                                  debbie 

        - Hepatic failure, unspecified without coma(22 12:06)                           debbie 

        - Alcoholic cirrhosis of liver with ascites(22 12:06)                           debbie 

        - Edema, unspecified                                                                  debbie 

        - Abnormal coagulation profile                                                        debbie 

      Followup:                                                                               debbie 

        - With: Private Physician                                                                  

        - When: Upon discharge from the Emergency Department                                       

        - Reason: Recheck today's complaints, Continuance of care, Re-evaluation by your           

      physician                                                                                   

      Discharge Instructions:                                                                     

        - Discharge Summary Sheet                                                             debbie 

        - Ascites                                                                             debbie 

        - Cirrhosis                                                                           debbie 

        - Edema                                                                               debbie 

        - Paracentesis                                                                        debbie 

        - Hepatic Encephalopathy                                                              debbie 

        - Edema, Easy-to-Read                                                                 debbie 

        - Alcoholic Liver Disease, Easy-to-Read                                               debbie 

        - Alcoholic Liver Disease                                                             debbie 

        - Liver Failure                                                                       debbie 

        - Peripheral Edema                                                                    debbie 

      Forms:                                                                                      

        - Medication Reconciliation Form                                                      debbie 

        - Thank You Letter                                                                    debbie 

        - Antibiotic Education                                                                debbie 

        - Prescription Opioid Use                                                             debbie 

      Prescriptions:                                                                              

        - Protonix 40 mg Oral Tablet                                                               

            - take 1 tablet by ORAL route once daily; 30 tablet; Refills: 0, Product          debbie 

      Selection Permitted                                                                         

        - Lasix 20 mg Oral Tablet                                                                  

            - take 1 tablet by ORAL route once daily; 20 tablet; Refills: 0, Product          debbie 

      Selection Permitted                                                                         

        - Lactulose 10 gram/15 mL Oral Solution                                                    

            - take 30 milliliters by ORAL route once daily; 300 milliliter; Refills: 0,       debbie 

      Product Selection Permitted                                                                 

        - Spironolactone 25 mg Oral Tablet                                                         

            - take 1 tablet by ORAL route every 12 hours; 40 tablet; Refills: 0, Product      debbie 

      Selection Permitted                                                                         

Signatures:                                                                                       

Dispatcher [employer]                           EDDory Masters Corey, MD MD cha Wise, Tara, RN                          RN   tw2                                                  

Tunde Armstrong, RN                      RN   bp                                                   

                                                                                                  

Corrections: (The following items were deleted from the chart)                                    

12:00 11:03 to sl debbie                                                                        debbie 

12:00 11:03 [facility] debbie                                         debbie 

12:00 11:03 Higher level of care debbie                                                          debbie 

12:00 11:03 Fair debbie                                                                          debbie 

12:00 11:03 new debbie                                                                           debbie 

12:00 11:03 have improved debbie                                                                 debbie 

12:00 11:03 Alcoholic cirrhosis of liver debbie                                                  debbie 

12:00 11:03 Alcoholic cirrhosis of liver with ascites debbie                                     debbie 

12:00 11:03 Hepatic failure, unspecified without coma debbie                                     debbie 

12:00 11:03 Acute and subacute hepatic failure debbie                                            debbie 

12:16 10:25 Misc. Order ordered. [facility]                                                          bp  

12:16 11:05 Blood Transfusion Consent ordered. [facility]                                            bp  

                                                                                                  

**************************************************************************************************

## 2022-08-03 NOTE — ER
Nurse's Notes                                                                                     

 [facility]                                                                 

Name: Emanuel Burch                                                                             

Age: 52 yrs                                                                                       

Sex: Male                                                                                         

: 1970                                                                                   

MRN: [id number]                                                                                   

Arrival Date: 2022                                                                          

Time: 08:49                                                                                       

Account#: [id number]                                                                            

Bed 8                                                                                             

Private MD:                                                                                       

Diagnosis: Acute and subacute hepatic failure;Hepatic failure, unspecified without coma;Alcoholic 

  cirrhosis of liver with ascites;Edema, unspecified;Abnormal coagulation profile                 

                                                                                                  

Presentation:                                                                                     

                                                                                             

09:03 Chief complaint: Patient states: i need to be drained. the last time i was drained was  tw2021. i have cirrhosis. i take lactuolose 3 times a day. i dont have any pain its       

      just uncomfortable when i am trying to put my shoes on and stuff. Coronavirus screen:       

      At this time, the client does not indicate any symptoms associated with coronavirus-19.     

      Ebola Screen: Patient denies travel to an Ebola-affected area in the 21 days before         

      illness onset. Initial Sepsis Screen: Does the patient meet any 2 criteria? HR > 90         

      bpm. No. Patient's initial sepsis screen is negative. Does the patient have a suspected     

      source of infection? No. Patient's initial sepsis screen is negative. Risk Assessment:      

      Do you want to hurt yourself or someone else? Patient reports no desire to harm self or     

      others. Onset of symptoms was 2022.                                              

09:03 Method Of Arrival: Ambulatory                                                           tw2 

09:03 Acuity: CHERYL 3                                                                           tw2 

                                                                                                  

Triage Assessment:                                                                                

09:05 General: Appears in no apparent distress. well groomed, Behavior is calm, cooperative,  tw2 

      appropriate for age. Pain: Denies pain. Neuro: Level of Consciousness is awake, alert,      

      obeys commands, Oriented to person, place, time, situation. Cardiovascular: Patient's       

      skin is warm and dry. Respiratory: Airway is patent Respiratory effort is even,             

      unlabored, Respiratory pattern is regular, symmetrical. GI: Abdomen is round distended,     

      noted to have ascites. Musculoskeletal: Range of motion: intact in all extremities.         

                                                                                                  

Historical:                                                                                       

- Allergies:                                                                                      

09:05 No Known Allergies;                                                                     tw2 

- Home Meds:                                                                                      

09:05 Lactulose 30mg Oral 3 times per day [Active];                                           tw2 

11:42 rifaximin 550 mg oral tab 1 tab 2 times per day [Active]; thiamine HCl (vitamin B1) 100 bp  

      mg Oral tab 100 mg daily [Active]; ergocalciferol (vitamin D2) 1,250 mcg (50,000 unit)      

      oral cap [Active]; Folvite-D 94 mcg- 1 mg oral tab 1 tab once daily [Active];               

- PMHx:                                                                                           

09:05 Cirrhosis; esophageal varices;                                                          tw2 

                                                                                                  

- Immunization history:: Client reports receiving the 2nd dose of the Covid vaccine.              

- Social history:: Smoking status: Patient denies any tobacco usage or history of.                

- Family history:: not pertinent.                                                                 

                                                                                                  

                                                                                                  

Screenin:57 Abuse screen: Denies threats or abuse. Nutritional screening: No deficits noted.        tw2 

      Tuberculosis screening: No symptoms or risk factors identified. Fall Risk None              

      identified.                                                                                 

                                                                                                  

Assessment:                                                                                       

08:57 Reassessment: pt not in exam room at this time.                                         tw2 

09:06 Reassessment: see triage assessment.                                                    tw2 

09:39 Reassessment: Patient appears in no apparent distress at this time. No changes from     tw2 

      previously documented assessment. Patient and/or family updated on plan of care and         

      expected duration. Pain level reassessed. Patient is alert, oriented x 3, equal             

      unlabored respirations, skin warm/dry/pink.                                                 

10:23 Reassessment: Patient appears in no apparent distress at this time. No changes from     tw2 

      previously documented assessment. Patient and/or family updated on plan of care and         

      expected duration. Pain level reassessed. Patient is alert, oriented x 3, equal             

      unlabored respirations, skin warm/dry/pink.                                                 

11:53 Reassessment: pt states "i am not going to be transferred, just take everything off me  tw2 

      and i will just go home and get my wife and go to [facility] in [place]", provider            

      notified. provider at bedside at this time explaining transfusion ordered of FFP, poc       

      and transfer plans at this time.                                                            

11:58 Reassessment: pt refusing transfer and treatment poc at this time. dr. weiss at      tw2 

      bedside discussing all risks of worsening conditions at this time.                          

                                                                                                  

Vital Signs:                                                                                      

09:03  / 78; Pulse 104; Resp 20; Temp 98.6(TE); Pulse Ox 100% on R/A; Weight 86.18 kg   tw2 

      (R); Height 5 ft. 8 in. (172.72 cm); Pain 0/10;                                             

09:38  / 71; Pulse 97; Resp 17; Pulse Ox 96% on R/A;                                    tw2 

10:23  / 72; Pulse 98; Resp 20; Pulse Ox 100% on R/A;                                   tw2 

11:10  / 74; Pulse 94; Resp 19; Pulse Ox 100% on R/A;                                   tw2 

11:54  / 74; Pulse 112; Resp 20; Pulse Ox 100% on R/A;                                  tw2 

09:03 Body Mass Index 28.89 (86.18 kg, 172.72 cm)                                             tw2 

                                                                                                  

ED Course:                                                                                        

08:49 Patient arrived in ED.                                                                  mr  

08:55 Kamron Weiss MD is Attending Physician.                                             debbie 

08:55 Tunde Armstrong, RN is Primary Nurse.                                                    bp  

08:57 Arm band placed on.                                                                     tw2 

09:05 Triage completed.                                                                       tw2 

09:06 Bed in low position. Call light in reach. Cardiac monitor on. Pulse ox on. NIBP on.     tw2 

09:18 Missed attempt(s): 20 gauge in right antecubital area. KJ, Tech - blood collected.      tw2 

      Inserted saline lock: 20 gauge in left antecubital area, using aseptic technique.           

09:45 XRAY Chest (1 view) In Process Unspecified.                                             EDMS

10:34 AMMONIA Sent.                                                                           kj1 

11:58 initiated transfer to [facility].                                              bd  

12:01 transfer cancelled by dr Weiss,pt does not want to be transfered.                    bd  

12:16 No provider procedures requiring assistance completed. IV discontinued, intact,         tw2 

      bleeding controlled, No redness/swelling at site. Pressure dressing applied.                

                                                                                                  

Administered Medications:                                                                         

10:54 Drug: Albumin 25 grams Volume: 100 ml; Route: IVPB; Site: left antecubital;             tw2 

11:41 Follow up: IV Status: Completed infusion; IV Intake: 100ml                              bp  

10:56 Drug: Vitamin K1 (phytonadione) 10 mg Route: Sub-Q; Site: left upper arm;               tw2 

11:15 Drug: ProTONIX (pantoprazole) 40 mg Route: IVP; Site: right antecubital;                bp  

11:15 Drug: Lasix (furosemide) 20 mg Route: IVP; Site: right antecubital;                     bp  

11:15 Drug: Spironolactone 50 mg Route: PO;                                                   bp  

11:15 Drug: Lactulose 30 grams Volume: 45 ml; Route: PO;                                      bp  

                                                                                                  

                                                                                                  

Medication:                                                                                       

09:06 VIS not applicable for this client.                                                     tw2 

                                                                                                  

Intake:                                                                                           

11:41 IV: 100ml; Total: 100ml.                                                                bp  

                                                                                                  

Outcome:                                                                                          

11:03 ER care complete, transfer ordered by MD.                                               [facility] 

12:06 Discharge ordered by MD.                                                                [facility] 

12:16 Discharged to home ambulatory.                                                          tw2 

12:16 Condition: stable                                                                           

12:16 Discharge instructions given to patient, Instructed on discharge instructions, follow       

      up and referral plans. medication usage, Demonstrated understanding of instructions,        

      follow-up care, medications, Prescriptions given X 4.                                       

12:17 Patient left the ED.                                                                    tw2 

                                                                                                  

Signatures:                                                                                       

Dispatcher [employer]                           EDMS                                                 

Dory Barbosa Corey, MD MD cha Rivera, Alka                                 mr                                                   

Vashti Thompson RN                          RN   tw2                                                  

Tunde Armstrong RN                      RN   Liza Horn                              kj1                                                  

                                                                                                  

**************************************************************************************************

## 2022-08-03 NOTE — EKG
Test Date:    2022-08-02               Test Time:    09:10:53

Technician:   IWONA                                     

                                                     

MEASUREMENT RESULTS:                                       

Intervals:                                           

Rate:         101                                    

RI:           138                                    

QRSD:         82                                     

QT:           372                                    

QTc:          482                                    

Axis:                                                

P:            71                                     

RI:           138                                    

QRS:          25                                     

T:            37                                     

                                                     

INTERPRETIVE STATEMENTS:                                       

                                                     

Sinus tachycardia

Nonspecific T wave abnormality

Abnormal ECG

Compared to ECG 11/22/2017 04:55:37

T-wave abnormality now present



Electronically Signed On 08-03-22 07:19:34 CDT by Jayy Finn

## 2022-08-10 ENCOUNTER — HOSPITAL ENCOUNTER (EMERGENCY)
Dept: HOSPITAL 97 - ER | Age: 52
Discharge: HOME | End: 2022-08-10
Payer: SELF-PAY

## 2022-08-10 VITALS — TEMPERATURE: 98.1 F

## 2022-08-10 VITALS — SYSTOLIC BLOOD PRESSURE: 127 MMHG | DIASTOLIC BLOOD PRESSURE: 78 MMHG | OXYGEN SATURATION: 99 %

## 2022-08-10 DIAGNOSIS — K70.31: Primary | ICD-10-CM

## 2022-08-10 DIAGNOSIS — I85.00: ICD-10-CM

## 2022-08-10 LAB
ALBUMIN SERPL BCP-MCNC: 1.5 G/DL (ref 3.4–5)
ALP SERPL-CCNC: 87 U/L (ref 45–117)
ALT SERPL W P-5'-P-CCNC: 28 U/L (ref 12–78)
ANISOCYTOSIS BLD QL: (no result)
AST SERPL W P-5'-P-CCNC: 50 U/L (ref 15–37)
BLD SMEAR INTERP: (no result)
BUN BLD-MCNC: 7 MG/DL (ref 7–18)
GLUCOSE SERPLBLD-MCNC: 113 MG/DL (ref 74–106)
HCT VFR BLD CALC: 30.7 % (ref 39.6–49)
LYMPHOCYTES # SPEC AUTO: 0.6 K/UL (ref 0.7–4.9)
MACROCYTES BLD QL: (no result)
MCV RBC: 110.7 FL (ref 80–100)
MORPHOLOGY BLD-IMP: (no result)
PMV BLD: 7.7 FL (ref 7.6–11.3)
POTASSIUM SERPL-SCNC: 3.5 MMOL/L (ref 3.5–5.1)
RBC # BLD: 2.78 M/UL (ref 4.33–5.43)

## 2022-08-10 PROCEDURE — 85025 COMPLETE CBC W/AUTO DIFF WBC: CPT

## 2022-08-10 PROCEDURE — 49083 ABD PARACENTESIS W/IMAGING: CPT

## 2022-08-10 PROCEDURE — 80053 COMPREHEN METABOLIC PANEL: CPT

## 2022-08-10 PROCEDURE — 36415 COLL VENOUS BLD VENIPUNCTURE: CPT

## 2022-08-10 PROCEDURE — 99284 EMERGENCY DEPT VISIT MOD MDM: CPT

## 2022-08-10 NOTE — RAD REPORT
EXAM DESCRIPTION:  US - Paracentesis Proc Guidance - 8/10/2022 11:38 am

 

CLINICAL HISTORY:  abd swelling

Ascites

 

COMPARISON:  US LIVER ONLY dated 10/30/2015

 

FINDINGS:  Informed consent was obtained and time-out was performed.

 

Patient's abdomen was prepped and draped in the usual sterile fashion. 1% lidocaine was used for loca
l anesthetic purposes.

 

A small skin incision was made. A paracentesis catheter was guided into the peroneal cavity under son
ographic guidance.

 

A small amount of fluid was sent for requested lab studies. A large volume paracentesis was performed
.

 

The patient tolerated the procedure well.

 

Patient was administered IV albumin per protocol following the procedure.

 

IMPRESSION:  Successful ultrasound-guided paracentesis.

## 2022-08-10 NOTE — EDPHYS
Physician Documentation                                                                           

 [facility]                                                                 

Name: Emanuel Burch                                                                             

Age: 52 yrs                                                                                       

Sex: Male                                                                                         

: 1970                                                                                   

MRN: [id number]                                                                                   

Arrival Date: 08/10/2022                                                                          

Time: 09:13                                                                                       

Account#: [id number]                                                                            

Bed 5                                                                                             

Private MD:                                                                                       

ED Physician Sarita Santos                                                                         

HPI:                                                                                              

08/10                                                                                             

09:55 This 52 yrs old  Male presents to ER via Ambulatory with complaints of          sp3 

      Abdominal Swelling.                                                                         

09:55 2-year-old male with a history of liver cirrhosis, esophageal varices, recurrent        sp3 

      ascites presents to the ED for recurrent abdominal swelling. Patient was seen               

      approximately 1 week ago and was offered transfer to [facility] but declined at         

      that time. His last paracentesis was done in 2021. He has not seen his GI          

      specialist for over 10 months secondary to loss of health insurance. He states his          

      health insurance restarts in October. Currently he denies any fever, chest pain, back       

      pain, shortness of breath, headache, bleeding, rash, known sick contacts nausea,            

      vomiting, diarrhea, or any other ROS at this time. He states that his fluid status in       

      his abdomen has been consistent over the last 3 weeks and is extremely uncomfortable..      

                                                                                                  

Historical:                                                                                       

- Allergies:                                                                                      

09:18 No Known Allergies;                                                                     ss  

- PMHx:                                                                                           

09:18 Cirrhosis; esophageal varices;                                                          ss  

- PSHx:                                                                                           

09:18 TIPS procedure; ankle repair;                                                           ss  

                                                                                                  

- Immunization history:: Client reports receiving the 2nd dose of the Covid vaccine.              

- Social history:: Smoking status: Patient denies any tobacco usage or history of.                

                                                                                                  

                                                                                                  

ROS:                                                                                              

09:58 Constitutional: Negative for fever, chills, and weight loss, Eyes: Negative for injury, sp3 

      pain, redness, and discharge, ENT: Negative for injury, pain, and discharge, Neck:          

      Negative for injury, pain, and swelling, Cardiovascular: Negative for chest pain,           

      palpitations, and edema, Respiratory: Negative for shortness of breath, cough,              

      wheezing, and pleuritic chest pain, Back: Negative for injury and pain, MS/Extremity:       

      Negative for injury and deformity, Skin: Negative for injury, rash, and discoloration,      

      Neuro: Negative for headache, weakness, numbness, tingling, and seizure, Psych:             

      Negative for depression, anxiety, suicide ideation, homicidal ideation, and                 

      hallucinations, Allergy/Immunology: Negative for hives, rash, and allergies.                

09:58 All other systems are negative.                                                             

                                                                                                  

Exam:                                                                                             

09:58 Constitutional:  This is a well developed, well nourished patient who is awake, alert,  sp3 

      and in no acute distress. Head/Face:  Normocephalic, atraumatic. Eyes:  Pupils equal        

      round and reactive to light, extra-ocular motions intact.  Lids and lashes normal.          

      Conjunctiva and sclera are non-icteric and not injected.  Cornea within normal limits.      

      Periorbital areas with no swelling, redness, or edema. ENT:  Nares patent. No nasal         

      discharge, no septal abnormalities noted.  External auditory canals are clear.              

      Oropharynx with no redness, swelling, or masses, exudates, or evidence of obstruction,      

      uvula midline.  Mucous membranes moist. Neck:  Trachea midline, no thyromegaly or           

      masses palpated, and no cervical lymphadenopathy.  Supple, full range of motion without     

      nuchal rigidity, or vertebral point tenderness.  No Meningismus. Chest/axilla:  Normal      

      chest wall appearance and motion.  Nontender with no deformity.  No lesions are             

      appreciated. Cardiovascular:  Regular rate and rhythm with a normal S1 and S2.  No          

      gallops, murmurs, or rubs.  Normal PMI, no JVD.  No pulse deficits. Respiratory:  Lungs     

      have equal breath sounds bilaterally, clear to auscultation and percussion.  No rales,      

      rhonchi or wheezes noted.  No increased work of breathing, no retractions or nasal          

      flaring. Skin:  Warm, dry with normal turgor.  Normal color with no rashes, no lesions,     

      and no evidence of cellulitis. MS/ Extremity:  Pulses equal, no cyanosis.                   

      Neurovascular intact.  Full, normal range of motion. Neuro:  Awake and alert, GCS 15,       

      oriented to person, place, time, and situation.  Cranial nerves II-XII grossly intact.      

      Motor strength 5/5 in all extremities.  Sensory grossly intact.  Cerebellar exam            

      normal.  Normal gait. Psych:  Awake, alert, with orientation to person, place and time.     

       Behavior, mood, and affect are within normal limits.                                       

09:58 Abdomen/GI: Markedly distended abdomen with positive fluid wave consistent with large       

      amount of ascites.  No peritoneal signs, rebound or guarding or tenderness noted..          

                                                                                                  

Vital Signs:                                                                                      

09:16  / 85; Pulse 114; Resp 22; Temp 98.1(TE); Pulse Ox 100% on R/A; Weight 86.18 kg;  ss  

      Height 5 ft. 8 in. (172.72 cm); Pain 3/10;                                                  

09:20  / 74; Pulse 100; Resp 19 S; Pulse Ox 99% ;                                       ha1 

10:05  / 72; Pulse 92; Resp 19 S; Pulse Ox 100% on R/A;                                 ha1 

11:40  / 68; Pulse 88; Resp 19 S; Pulse Ox 100% on R/A;                                 ha1 

12:30  / 73; Pulse 86; Resp 18 S; Pulse Ox 100% on R/A;                                 ha1 

13:30  / 78; Pulse 86; Resp 18 S; Pulse Ox 99% on R/A;                                  ha1 

09:16 Body Mass Index 28.89 (86.18 kg, 172.72 cm)                                             ss  

                                                                                                  

MDM:                                                                                              

09:42 Patient medically screened.                                                             sp3 

09:59 Data reviewed: vital signs, nurses notes. ED course: 52-year-old male with liver        sp3 

      failure and ascites. He will need a therapeutic paracentesis. We have contacted             

      interventional radiology attempt to get him on the schedule for this procedure. If his      

      paracentesis can be completed, we will discharge him from the emergency department..        

12:13 ED course: Paracentesis complete. He is hyponatremic at 129 and hyperbilirubinemic at   sp3 

      13. Offered admission but patient elects to be discharged at this time. He feels better     

      after the paracentesis and states he will follow-up with his GI specialist..                

                                                                                                  

08/10                                                                                             

09:54 Order name: CBC with Diff                                                               sp3 

08/10                                                                                             

09:54 Order name: CMP; Complete Time: 12:12                                                   sp3 

08/10                                                                                             

13:13 Order name: CBC Smear Scan                                                              EDMS

08/10                                                                                             

09:54 Order name: IV Saline Lock; Complete Time: 10:23                                        sp3 

08/10                                                                                             

09:54 Order name: Labs collected and sent; Complete Time: 10:23                               sp3 

08/10                                                                                             

10:01 Order name: Paracentesis Proc Guidance; Complete Time: 12:04                            EDMS

08/10                                                                                             

10:46 Order name: Labs - recollect needed: recollect green top                                bd  

                                                                                                  

Administered Medications:                                                                         

No medications were administered                                                                  

                                                                                                  

                                                                                                  

Disposition Summary:                                                                              

08/10/22 12:14                                                                                    

Discharge Ordered                                                                                 

      Location: Home                                                                          sp3 

      Condition: Stable                                                                       sp3 

      Diagnosis                                                                                   

        - Alcoholic cirrhosis of liver with ascites                                           sp3 

      Followup:                                                                               sp3 

        - With: Private Physician                                                                  

        - When: Upon discharge from the Emergency Department                                       

        - Reason: Continuance of care                                                              

      Discharge Instructions:                                                                     

        - Discharge Summary Sheet                                                             sp3 

        - Ascites                                                                             sp3 

        - Cirrhosis                                                                           sp3 

        - Paracentesis                                                                        sp3 

      Forms:                                                                                      

        - Medication Reconciliation Form                                                      sp3 

        - Thank You Letter                                                                    sp3 

        - Antibiotic Education                                                                sp3 

        - Prescription Opioid Use                                                             sp3 

Signatures:                                                                                       

Dispatcher [employer]                           EDMS                                                 

Dory Barbosa Shelby, RN                      RN   ss                                                   

Sarita Santos MD MD   sp3                                                  

                                                                                                  

Corrections: (The following items were deleted from the chart)                                    

13:14 10:22 Manual Differential ordered. EDMS                                                 EDMS

                                                                                                  

**************************************************************************************************

## 2022-08-10 NOTE — XMS REPORT
Continuity of Care Document

                           Created on:August 10, 2022



Patient:MEHUL CESAR

Sex:Male

:1970

External Reference #:[id number]





Demographics







                          Address                   [address] 



                                                    [place] [address]

 

                          Home Phone                [phone]

 

                          Mobile Phone              [phone]

 

                          Email Address             [email or web address]

 

                          Preferred Language        English

 

                          Marital Status            Unknown

 

                          Mormon Affiliation     Unknown

 

                          Race                      Unknown

 

                          Additional Race(s)        Unavailable

 

                          Ethnic Group              Unknown









Author







                          Organization              [facility]

t

 

                          Address                   [address]



                                                    [place] [address]

 

                          Phone                     [phone]









Support







                Name            Relationship    Address         Phone

 

                MIGUEL ANGEL CESAR               [address]available



                                                [address]             



                                                [place] [address] 









Care Team Providers







                    Name                Role                Phone

 

                    OWENRADHAJERRODCASSIDY PASCAL Primary Care Physician Unavailable

 

                    JONNA MAHMOOD    Attending Clinician Unavailable

 

                    MARGE HOPKINS   Attending Clinician Unavailable

 

                    ENA KNOX      Attending Clinician Unavailable

 

                    MELINDA MEDINA  Attending Clinician Unavailable

 

                    ESTRELLITA POSEY  Attending Clinician Unavailable

 

                    MACEY OLIVAREZ Attending Clinician Unavailable

 

                    JONNA MAHMOOD    Admitting Clinician Unavailable

 

                    MARGE HOPKINS   Admitting Clinician Unavailable

 

                    ENA KNOX      Admitting Clinician Unavailable

 

                    MACEY OLIVAREZ Admitting Clinician Unavailable









Payers







           Payer Name Policy Type Policy Number Effective Date Expiration Date LAURENCE HERNANDEZ OPEN ACCESS            [id number]  2013 



           O NAP                          00:00:00   00:00:00   

 

           [facility]            [id number] 2019            



                                            00:00:00              







Problems

This patient has no known problems.



Allergies, Adverse Reactions, Alerts







       Allergy Allergy Status Severity Reaction(s) Onset  Inactive Treating Comm

ents 

Source



       Name   Type                        Date   Date   Clinician        

 

       NO KNOWN Allergy Active                                           [facility]



       ALLERGIE                                                         



       S                                                              







Medications

This patient has no known medications.



Vital Signs







             Vital Name   Observation Time Observation Value Comments     Source

 

             HEIGHT       2021 15:18:00 172.7 cm                  

 

             WEIGHT       2021 15:18:00 81.194 kg                 

 

             HEIGHT       2021 15:18:00 172.7 cm                  

 

             WEIGHT       2021 15:18:00 81.194 kg                 

 

             HEIGHT       2021 11:00:00 172.7 cm                  

 

             WEIGHT       2021 11:00:00 81.194 kg                 

 

             HEIGHT       2021 11:00:00 172.7 cm                  

 

             WEIGHT       2021 11:00:00 81.194 kg                 







Procedures

This patient has no known procedures.



Encounters







        Start   End     Encounter Admission Attending Care    Care    Encounter 

Source



        Date/Time Date/Time Type    Type    Clinicians Facility Department ID   

   

 

        2021-10-09         Outpatient         TIMOTEO [facility]    Surgery [id number]

 [facility]



        01:00:26                         JONNA                          

 

        2021         Inpatient UR      NALAM  [facility]    Internal [id number]

 [facility]



        14:51:00                         MARGE           Med             

 

        2021 Outpatient HAYES MEDINA [facility]    7

[id number] [facility]



        00:00:00 00:00:00                 RISE                            

 

        2021 Outpatient [facility]    [id number]

[id number] [facility]



        00:00:00 00:00:00                                                 

 

        2021 Outpatient       ABILIO ENA [facility]    Radiology 2

[id number] [facility]



        06:16:51 09:51:00                                                 

 

        2021 Outpatient [facility]    [id number]

170 [facility]



        00:00:00 00:00:00                                                 

 

        2021 Outpatient         [facility]    [id number]

[id number] [facility]



        00:00:00 00:00:00                 ESTRELLITA                          

 

        2021 Outpatient       KALPESH   [facility]    [id number]

022 [facility]



        00:00:00 00:00:00                 ESTRELLITA                          

 

        2021 Outpatient                 [facility]    [id number]

147 [facility]



        00:00:00 00:00:00                                                 

 

        2019-12-10 2019-12-10 Outpatient [facility]    [id number]

253 [facility]



        00:00:00 00:00:00                                                 







Results







           Test Description Test Time  Test Comments Results    Result Comments 

Source









                    HEPATIC FUNCTION PANEL 2022 01:12:10 









                      Test Item  Value      Reference Range Interpretation Comme

nts









             PROTEIN, TOTAL (test code = 7.9 G/DL     6.1-8.3                   



             )                                               

 

             ALBUMIN (test code = 2201) 2.0 G/DL     3.5-5.2      L            

 

             BILIRUBIN, TOTAL (test code = 11.3 MG/DL   See_Comment  H          

   [Automated message] The



             )                                               system which ge

nerated this



                                                                 result transmit

susi reference



                                                                 range: <=1.2. T

he reference



                                                                 range was not u

sed to



                                                                 interpret this 

result as



                                                                 normal/abnormal

.

 

             BILIRUBIN, DIRECT (test code = 3.6 MG/DL    0.0-0.3      H         

   



             )                                               

 

             ALKALINE PHOSPHATASE (test 101 U/L                          



             code = 2204)                                        

 

             AST (test code = 2218) 49 U/L       9-50                      

 

             ALT (test code = 2219) 25 U/L       5-50                      



CBC W/AUTO DIFF WITH [id number] 04:37:27





             Test Item    Value        Reference Range Interpretation Comments

 

             WBC (test code = 4.8 K/UL     3.5-11.0                  



             1001)                                               

 

             RBC (test code = 3.14 M/UL    4.50-6.10    L            



             1002)                                               

 

             HEMOGLOBIN (test 11.7 G/DL    13.5-17.0    L            



             code = 1003)                                        

 

             HEMATOCRIT (test 32.9 %       40.0-51.0    L            



             code = 1004)                                        

 

             MCV (test code = 104.8 fL     80.0-99.0    H            



             1005)                                               

 

             MCH (test code = 37.3 PG      25.0-33.0    H            



             1006)                                               

 

             MCHC (test code = 35.6 G/DL    31.0-36.0                 



             1007)                                               

 

             RDW (test code = 12.8 %       11.5-15.0                 



             1038)                                               

 

             NEUTROPHILS (test 58.4 %                                  AUTOMATED



             code = 1008)                                        DIFFERENTIAL



                                                                 CONFIRMED  WITH



                                                                 MANUAL SLIDE RE

VIEW.

 

             LYMPHOCYTES (test 18.6 %                                 



             code = 1010)                                        

 

             MONOCYTES (test code 15.9 %                                 



             = 1011)                                             

 

             EOSINOPHILS (test 5.0 %                                  



             code = 1012)                                        

 

             BASOPHILS (test code 1.3 %                                  



             = 1013)                                             

 

             IMMATURE     0.8 %                                  



             GRANULOCYTES (test                                        



             code = 1036)                                        

 

             NUCLEATED RBCS (test 0.0 /100     See_Comment                [Autom

ated message]



             code = 1065) WBC'S                                  The system whic

h



                                                                 generated this 

result



                                                                 transmitted ref

erence



                                                                 range: 0.0. The



                                                                 reference range

 was



                                                                 not used to int

erpret



                                                                 this result as



                                                                 normal/abnormal

.

 

             PLATELET COUNT (test 92 K/UL      130-400      L            



             code = 1015)                                        

 

             ABSOLUTE NEUTROPHILS 2.80 K/UL    1.50-7.50                 



             (test code = 1066)                                        

 

             ABSOLUTE LYMPHOCYTES 0.89 K/UL    1.00-4.00    L            



             (test code = 1067)                                        

 

             ABSOLUTE MONOCYTES 0.76 K/UL    0.20-1.00                 



             (test code = 1068)                                        

 

             ABSOLUTE EOSINOPHILS 0.24 K/UL    0.00-0.50                 



             (test code = 1040)                                        

 

             ABSOLUTE BASOPHILS 0.06 K/UL    0.00-0.20                 



             (test code = 1069)                                        

 

             ABS IMMATURE 0.04 K/UL    0.00-0.10                 



             GRANULOCYTES (test                                        



             code = 1020)                                        

 

             ABS NUCLEATED RBCS 0.02 K/UL    0.00-0.11                 



             (test code = 15032)                                        

 

             COMMENTS (test code (NOTE)                                  SLIGHT 

MACROCYTOSIS



             = 1016)                                             SEE ADDITIONAL



                                                                 COMMENTS BELOW:



                                                                 PLATELET CLUMPI

NG



                                                                 PRESENT; TRUE



                                                                 PLATELET COUNT 

MAY BE



                                                                 HIGHER. IF CLIN

ICALLY



                                                                 INDICATED, ORDE

R



                                                                 REPEAT PLATELET

 COUNT



                                                                 WITH CITRATE



                                                                 ANTICOAGULATED 

TUBE



                                                                 (CPL ORDER CODE



                                                                 1047). UNLESS



                                                                 OTHERWISE INDIC

ATED,



                                                                 ALL TESTING PER

FORMED



                                                                 ATCLINICAL PATH

[employer], [facility].



                                                                 [address] [place], [place] [address] [place] [address]



                                                                 DIRECTOR: BETH MESA M.D.

 CLIA



                                                                 NUMBER [id number]

[id number]



                                                                 ACCREDITATION N

O.



                                                                 [id number]



MISCELLANEOUS LAB [id number] 13:01:00





             Test Item    Value        Reference Range Interpretation Comments

 

             SCAN RESULT (test code = 7527482)                                  

      



BASIC METABOLIC [id number] 14:32:00





             Test Item    Value        Reference Range Interpretation Comments

 

             SODIUM (BEAKER) 138 meq/L    136-145                   



             (test code = 381)                                        

 

             POTASSIUM (BEAKER) 3.8 meq/L    3.5-5.1                   



             (test code = 379)                                        

 

             CHLORIDE (BEAKER) 107 meq/L                        



             (test code = 382)                                        

 

             CO2 (BEAKER) (test 24 meq/L     22-29                     



             code = 355)                                         

 

             BLOOD UREA NITROGEN 4 mg/dL      7-21         L            



             (BEAKER) (test code                                        



             = 354)                                              

 

             CREATININE (BEAKER) 0.83 mg/dL   0.57-1.25                 



             (test code = 358)                                        

 

             GLUCOSE RANDOM 60 mg/dL            L            



             (BEAKER) (test code                                        



             = 652)                                              

 

             CALCIUM (BEAKER) 8.6 mg/dL    8.4-10.2                  



             (test code = 697)                                        

 

             EGFR (BEAKER) (test 98 mL/min/1.73                           ESTIMA

SUSI GFR IS



             code = 1092) sq m                                   NOT AS ACCURATE

 AS



                                                                 CREATININE



                                                                 CLEARANCE IN



                                                                 PREDICTING



                                                                 GLOMERULAR



                                                                 FILTRATION RATE

.



                                                                 ESTIMATED GFR I

S



                                                                 NOT APPLICABLE 

FOR



                                                                 DIALYSIS PATIEN

TS.



 ID - RHONDA Pearceecimen moderately ictericHEPATIC FUNCTION [id number] 
14:32:00





             Test Item    Value        Reference Range Interpretation Comments

 

             TOTAL PROTEIN (BEAKER) (test code = 7.8 gm/dL    6.0-8.3           

        



             770)                                                

 

             ALBUMIN (BEAKER) (test code = 1145) 2.5 g/dL     3.5-5.0      L    

        

 

             BILIRUBIN TOTAL (BEAKER) (test code 5.8 mg/dL    0.2-1.2      H    

        



             = 377)                                              

 

             BILIRUBIN DIRECT (BEAKER) (test 2.5 mg/dL    0.1-0.5      H        

    



             code = 706)                                         

 

             ALKALINE PHOSPHATASE (BEAKER) (test 163 U/L             H    

        



             code = 346)                                         

 

             AST (SGOT) (BEAKER) (test code = 47 U/L       5-34         H       

     



             353)                                                

 

             ALT (SGPT) (BEAKER) (test code = 18 U/L       6-55                 

     



             347)                                                



 ID - RHONDA Mckeonimedolly moderately ictericPROTHROMBIN TIME/[id number] 
14:23:00





             Test Item    Value        Reference Range Interpretation Comments

 

             PROTIME (BEAKER) 21.9 seconds 11.9-14.2    H            



             (test code = 759)                                        

 

             INR (BEAKER) (test 1.97         See_Comment                [Automat

ed message]



             code = 370)                                         The system [employer]



                                                                 generated this 

result



                                                                 transmitted ref

erence



                                                                 range: <=5.90. 

The



                                                                 reference range

 was



                                                                 not used to int

erpret



                                                                 this result as



                                                                 normal/abnormal

.



Effective 2019: PT Reference Range ChangeNew: 11.9-14.2 Previous: 11.7-
14.7RECOMMENDED COUMADIN/WARFARIN INR THERAPY RANGESSTANDARD DOSE: 2.0-3.0 
Includes: PROPHYLAXIS for venous thrombosis, systemic embolization; TREATMENT 
for venous thrombosis and/or pulmonary embolus.HIGH RISK: Target INR is 2.5-3.5 
for patients wiht mechanical heart valves.CBC W/PLT COUNT &amp; AUTO 
[id number] 14:21:00





             Test Item    Value        Reference Range Interpretation Comments

 

             WHITE BLOOD CELL COUNT (BEAKER) 4.5 K/ L     3.5-10.5              

    



             (test code = 775)                                        

 

             RED BLOOD CELL COUNT (BEAKER) 3.70 M/ L    4.63-6.08    L          

  



             (test code = 761)                                        

 

             HEMOGLOBIN (BEAKER) (test code = 12.5 GM/DL   13.7-17.5    L       

     



             410)                                                

 

             HEMATOCRIT (BEAKER) (test code = 37.4 %       40.1-51.0    L       

     



             411)                                                

 

             MEAN CORPUSCULAR VOLUME (BEAKER) 101.1 fL     79.0-92.2    H       

     



             (test code = 753)                                        

 

             MEAN CORPUSCULAR HEMOGLOBIN 33.8 pg      25.7-32.2    H            



             (BEAKER) (test code = 751)                                        

 

             MEAN CORPUSCULAR HEMOGLOBIN CONC 33.4 GM/DL   32.3-36.5            

     



             (BEAKER) (test code = 752)                                        

 

             RED CELL DISTRIBUTION WIDTH 14.9 %       11.6-14.4    H            



             (BEAKER) (test code = 412)                                        

 

             PLATELET COUNT (BEAKER) (test 124 K/CU MM  150-450      L          

  



             code = 756)                                         

 

             MEAN PLATELET VOLUME (BEAKER) 11.8 fL      9.4-12.4                

  



             (test code = 754)                                        

 

             NUCLEATED RED BLOOD CELLS 0 /100 WBC   0-0                       



             (BEAKER) (test code = 413)                                        

 

             NEUTROPHILS RELATIVE PERCENT 43 %                                  

 



             (BEAKER) (test code = 429)                                        

 

             LYMPHOCYTES RELATIVE PERCENT 34 %                                  

 



             (BEAKER) (test code = 430)                                        

 

             MONOCYTES RELATIVE PERCENT 12 %                                   



             (BEAKER) (test code = 431)                                        

 

             EOSINOPHILS RELATIVE PERCENT 10 %                                  

 



             (BEAKER) (test code = 432)                                        

 

             BASOPHILS RELATIVE PERCENT 1 %                                    



             (BEAKER) (test code = 437)                                        

 

             NEUTROPHILS ABSOLUTE COUNT 1.92 K/ L    1.78-5.38                 



             (BEAKER) (test code = 670)                                        

 

             LYMPHOCYTES ABSOLUTE COUNT 1.55 K/ L    1.32-3.57                 



             (BEAKER) (test code = 414)                                        

 

             MONOCYTES ABSOLUTE COUNT (BEAKER) 0.55 K/ L    0.30-0.82           

      



             (test code = 415)                                        

 

             EOSINOPHILS ABSOLUTE COUNT 0.45 K/ L    0.04-0.54                 



             (BEAKER) (test code = 416)                                        

 

             BASOPHILS ABSOLUTE COUNT (BEAKER) 0.05 K/ L    0.01-0.08           

      



             (test code = 417)                                        

 

             IMMATURE GRANULOCYTES-RELATIVE 0 %          0-1                    

   



             PERCENT (BEAKER) (test code =                                      

  



             2801)                                               



MISCELLANEOUS LAB [id number] 10:46:00





             Test Item    Value        Reference Range Interpretation Comments

 

             SCAN RESULT (test code = 4042034)                                  

      



BASIC METABOLIC [id number] 12:16:00





             Test Item    Value        Reference Range Interpretation Comments

 

             SODIUM (BEAKER) 135 meq/L    136-145      L            



             (test code = 381)                                        

 

             POTASSIUM (BEAKER) 3.9 meq/L    3.5-5.1                   



             (test code = 379)                                        

 

             CHLORIDE (BEAKER) 107 meq/L                        



             (test code = 382)                                        

 

             CO2 (BEAKER) (test 25 meq/L     22-29                     



             code = 355)                                         

 

             BLOOD UREA NITROGEN 6 mg/dL      7-21         L            



             (BEAKER) (test code                                        



             = 354)                                              

 

             CREATININE (BEAKER) 0.77 mg/dL   0.57-1.25                 



             (test code = 358)                                        

 

             GLUCOSE RANDOM 83 mg/dL                         



             (BEAKER) (test code                                        



             = 652)                                              

 

             CALCIUM (BEAKER) 7.8 mg/dL    8.4-10.2     L            



             (test code = 697)                                        

 

             EGFR (BEAKER) (test 107 mL/min/1.73                           ESTIM

ATED GFR IS



             code = 1092) sq m                                   NOT AS ACCURATE

 AS



                                                                 CREATININE



                                                                 CLEARANCE IN



                                                                 PREDICTING



                                                                 GLOMERULAR



                                                                 FILTRATION RATE

.



                                                                 ESTIMATED GFR I

S



                                                                 NOT APPLICABLE 

FOR



                                                                 DIALYSIS PATIEN

TS.



 ID Dre TOPETE FSpecimen moderately ictericHEPATIC FUNCTION PANEL
2021 12:07:00





             Test Item    Value        Reference Range Interpretation Comments

 

             TOTAL PROTEIN (BEAKER) (test code = 6.6 gm/dL    6.0-8.3           

        



             770)                                                

 

             ALBUMIN (BEAKER) (test code = 1145) 2.3 g/dL     3.5-5.0      L    

        

 

             BILIRUBIN TOTAL (BEAKER) (test code 5.9 mg/dL    0.2-1.2      H    

        



             = 377)                                              

 

             BILIRUBIN DIRECT (BEAKER) (test 2.2 mg/dL    0.1-0.5      H        

    



             code = 706)                                         

 

             ALKALINE PHOSPHATASE (BEAKER) (test 109 U/L                  

        



             code = 346)                                         

 

             AST (SGOT) (BEAKER) (test code = 54 U/L       5-34         H       

     



             353)                                                

 

             ALT (SGPT) (BEAKER) (test code = 24 U/L       6-55                 

     



             347)                                                



 ID Dre TOPETE FSpecimen moderately ictericPROTHROMBIN TIME/INR
2021 12:01:00





             Test Item    Value        Reference Range Interpretation Comments

 

             PROTIME (BEAKER) 24.8 seconds 11.9-14.2    H            



             (test code = 759)                                        

 

             INR (BEAKER) (test 2.31         See_Comment                [Automat

ed message]



             code = 370)                                         The system [employer]



                                                                 generated this 

result



                                                                 transmitted ref

erence



                                                                 range: <=5.90. 

The



                                                                 reference range

 was



                                                                 not used to int

erpret



                                                                 this result as



                                                                 normal/abnormal

.



Effective 2019: PT Reference Range ChangeNew: 11.9-14.2 Previous: 11.7-
14.7RECOMMENDED COUMADIN/WARFARIN INR THERAPY RANGESSTANDARD DOSE: 2.0-3.0 
Includes: PROPHYLAXIS for venous thrombosis, systemic embolization; TREATMENT 
for venous thrombosis and/or pulmonary embolus.HIGH RISK: Target INR is 2.5-3.5 
for patients wiht mechanical heart valves.CBC W/PLT COUNT &amp; AUTO 
[id number] 11:44:00





             Test Item    Value        Reference Range Interpretation Comments

 

             WHITE BLOOD CELL COUNT (BEAKER) 3.7 K/ L     3.5-10.5              

    



             (test code = 775)                                        

 

             RED BLOOD CELL COUNT (BEAKER) 3.30 M/ L    4.63-6.08    L          

  



             (test code = 761)                                        

 

             HEMOGLOBIN (BEAKER) (test code = 11.6 GM/DL   13.7-17.5    L       

     



             410)                                                

 

             HEMATOCRIT (BEAKER) (test code = 34.8 %       40.1-51.0    L       

     



             411)                                                

 

             MEAN CORPUSCULAR VOLUME (BEAKER) 105.5 fL     79.0-92.2    H       

     



             (test code = 753)                                        

 

             MEAN CORPUSCULAR HEMOGLOBIN 35.2 pg      25.7-32.2    H            



             (BEAKER) (test code = 751)                                        

 

             MEAN CORPUSCULAR HEMOGLOBIN CONC 33.3 GM/DL   32.3-36.5            

     



             (BEAKER) (test code = 752)                                        

 

             RED CELL DISTRIBUTION WIDTH 13.9 %       11.6-14.4                 



             (BEAKER) (test code = 412)                                        

 

             PLATELET COUNT (BEAKER) (test code 83 K/CU MM   150-450      L     

       



             = 756)                                              

 

             MEAN PLATELET VOLUME (BEAKER) 12.1 fL      9.4-12.4                

  



             (test code = 754)                                        

 

             NUCLEATED RED BLOOD CELLS (BEAKER) 0 /100 WBC   0-0                

       



             (test code = 413)                                        

 

             NEUTROPHILS RELATIVE PERCENT 46 %                                  

 



             (BEAKER) (test code = 429)                                        

 

             LYMPHOCYTES RELATIVE PERCENT 34 %                                  

 



             (BEAKER) (test code = 430)                                        

 

             MONOCYTES RELATIVE PERCENT 14 %                                   



             (BEAKER) (test code = 431)                                        

 

             EOSINOPHILS RELATIVE PERCENT 5 %                                   

 



             (BEAKER) (test code = 432)                                        

 

             BASOPHILS RELATIVE PERCENT 1 %                                    



             (BEAKER) (test code = 437)                                        

 

             NEUTROPHILS ABSOLUTE COUNT 1.69 K/ L    1.78-5.38    L            



             (BEAKER) (test code = 670)                                        

 

             LYMPHOCYTES ABSOLUTE COUNT 1.24 K/ L    1.32-3.57    L            



             (BEAKER) (test code = 414)                                        

 

             MONOCYTES ABSOLUTE COUNT (BEAKER) 0.53 K/ L    0.30-0.82           

      



             (test code = 415)                                        

 

             EOSINOPHILS ABSOLUTE COUNT 0.20 K/ L    0.04-0.54                 



             (BEAKER) (test code = 416)                                        

 

             BASOPHILS ABSOLUTE COUNT (BEAKER) 0.03 K/ L    0.01-0.08           

      



             (test code = 417)                                        

 

             IMMATURE GRANULOCYTES-RELATIVE 0 %          0-1                    

   



             PERCENT (BEAKER) (test code =                                      

  



             2801)                                               



U/S, [id number] 23:08:00Referring: Dr. Qiana Liu Administer 
200 mL of albumin 25% (50 grams) IV x1 after paracentesis if 3 or more liters 
removed. Send ascitic fluid for cell count and differential. Administer 200 mL 
ofalbumin 25% (50 grams) IV x1 after paracentesis if 3 or more liters removed. 
Send ascitic fluid for cell count and differential. Labs to be ordered:-
&gt;Other (please add comment) Reason for Exam:-&gt;ascites, portal 
hypertension, cirrhosis
************************************************************[facility] : 1970 Sex: 
M************************************************************FINAL REPORT 
PATIENT ID: [id number] Ultrasound guided paracentesis. Clinical History: Ascites. 
Sedation: None. : Denisa Tellez PA-C Assistant: None. Estimated
Blood Loss: &lt; 1 cc. Specimen: 4200 cc of clear yellow fluid, samples sent to 
laboratory. Technique: Informed consent was obtained. The risks of pain, 
bleeding, infection, bowel perforation, injury to adjacent structures, and adv
erse medication reactions were discussed with the patient. After informed 
consent was obtained, the patient's abdomen was scanned. The right upper 
quadrant of the abdomen was selected for paracentesis.After the largest fluid 
pocket area was marked, and the anterior abdominal wall was evaluated with color
Doppler to exclude presence of blood vessels traversing the area, the skin was 
prepped and draped in the usual sterile manner. After local anesthesia was 
achieved with 2% lidocaine, a 5 Liberian one-step catheter was advanced into the 
peritoneal cavity under ultrasound guidance. After completion of drainage, the 
catheter was removed. There was no evidence of complication. 
Impression:Successful ultrasound guided paracentesis. Signed: Latasha Santos 
MDReport Verified Date/Time: 2021 23:08:28 Reading Location: [facility] B1 P006J 
Ultrasound Reading Room Electronically signed by: LATASHA SANTOS MD on 
2021 11:08 PMSARS-COV2/RT-PCR ([facility] of [facility] in the [place] &amp; REF LABS)2021 20:09:00





             Test Item    Value        Reference Range Interpretation Comments

 

             SARS-COV2/RT-PCR (test Negative     Not Detected, Negative,        

      



             code = 9983375)              See external report for              



                                       linked test               

 

             SARS-COV-2 PERFORMING LAB [facility]                             



             (test code = 4308351)                                        



Negative result for this test determines that SARS-CoV-2 RNA was not present in 
the specimen above the Limit of Detection (LOD). However, Negative results do 
not preclude SARS-CoV-2 infection and should not be used as the sole basis for 
treatment or patient management decisions. Negative results must be combined 
with clinical observations, patient history, and epidemiological information. A 
false negative result may occur if a specimen is improperly collected, 
transported or handled. A false negative result should be considered if 
patient's recent exposures or clinical presentation indicate that COVID-19 
(SARS-CoV-2) is likely and diagnostic tests for other causes of illness are 
negative. Re-testing should be considered in cases of suspected false 
negatives.The limit of detection for this assay is 800 copies/mL.This SARS CoV-2
test is a real-time RT-PCR test intended for the qualitative detection of 
nucleic acid from SARS-CoV-2 in a nasopharyngeal swab specimen collected from 
individuals suspected of COVID-19 by their healthcare provider.This test has not
been Food and Drug Administration (FDA) cleared or approved. This is a modified 
version of an approved Emergency Use Authorization (EUA) and is in the process 
of review by the FDA. Once authorized by the FDA, the issued EUA will be 
effective until the declaration that circumstances exist justifying the 
authorization of the emergency use ofin vitro diagnostic tests for detection 
and/or diagnosis of COVID-19 is terminated under Section 564(b)(2) of the Act or
the EUA is revoked under Section 564(g) of the Act.Fact Sheet for Healthcare 
Prov
iders:https://www.[employer]/sites/default/files/product/documents/Fact_Sheet_HC

_Cebchiglx_Satk_WCAG-HiS-7.pdfFact Sheet for Healthcare 
Patients:https://www.[employer]/sites/default/files/product/docume
nts/Hjas_Neiqh_Yvbkewfz_Clnh_MOMV-JyR-9.pdfPerforming Laboratory:[facility], [place] [address]YOGH FLUID CELL COUNT 
WITH [id number] 11:53:00





             Test Item    Value        Reference Range Interpretation Comments

 

             APPEARANCE FLUID (BEAKER) (test Hazy         Clear        A        

    



             code = 510)                                         

 

             COLOR FLUID (BEAKER) (test code Yellow       Colorless, Straw A    

        



             = 511)                                              

 

             RBC FLUID (BEAKER) (test code = 490 /cu mm   <=1          H        

    



             513)                                                

 

             ADJUSTED WBC FLUID (BEAKER) 474 /cu mm   <=5          H            



             (test code = 1691)                                        

 

             LINING CELLS (BEAKER) (test 28 /cu mm    <=1          H            



             code = 1590)                                        

 

             NEUTROPHILS FLUID (BEAKER) 10 %                                   



             (test code = 1656)                                        

 

             LYMPHS FLUID (BEAKER) (test 55 %                                   



             code = 488)                                         

 

             MONO/MACROPHAGE FLUID (BEAKER) 35 %                                

   



             (test code = 489)                                        

 

             EOSINOPHILS FLUID (BEAKER) 0 %                                    



             (test code = 491)                                        

 

             BASO FLUID (BEAKER) (test code 0 %                                 

   



             = 492)                                              

 

             CONTAINER BODY FLUID (BEAKER) Sterile Vial                         

  



             (test code = 2873)                                        



BLOOD [id number] 21:00:00





             Test Item    Value        Reference Range Interpretation Comments

 

             CULTURE (BEAKER) (test No growth in 5 days                         

  



             code = 1095)                                        



BLOOD [id number] 21:00:00





             Test Item    Value        Reference Range Interpretation Comments

 

             CULTURE (BEAKER) (test No growth in 5 days                         

  



             code = 1095)                                        



TISSUE [id number] 18:03:00Surgical Pathology Report Case: [id number] 
Authorizing Provider: Cathryn Frankel MD Collected: 2021 08:47 AM 
Ordering Location: [address] Received: 2021 08:23 AM Service 
Pathologist: Geronimo Garrison MD  Specimen: Polyp, Colon - Left/Descending, x3 
COLON, LEFT/DESCENDING COLON POLYPS X 3, POLYPECTOMY-  TUBULAR ADENOMA, 
FRAGMENTS OF- HIGH GRADE DYSPLASIA OR CARCINOMA NOT SEEN Signing Pathologist 
Direct Phone Line: [phone] signed by Geronimo Garrison MD on  at 6:03 [id number] CANCER SCREENINGDescending colonReceived in 
formalin labeled the patient's name, accession number and "descending colon 
polyp" are 3 tan-pink tissue fragments measuring up to 0.2 cm in greatest 
dimension which are filtered and submitted in toto in A1.RAISSA Ha, HT 
(ASCP)Performed.[facility], Department of Pathology, [address] [place] [address], Tel [phone] State Mental Hospital, Department of Pathology, [address] [place] [address], Tel 
[phone] State Mental Hospital, Department of Pathology, [address] [place] [address], Tel [phone], CTA CORONARY WITH CALCIUM
EVAL AND FFR [id number] 15:35:00Referring: Dr. Qiana Liu
************************************************************[facility] : 1970 Sex: 
M************************************************************Addendum 
BeginsREPORT STATUS:A PATIENT ID: [id number] I agree with the nonvascular findings
with exceptions and emphasis as below:*Cirrhotic liver with a TIPS and small 
volume upper abdominal ascites.*Trace bilateral pleural effusions with 
subsegmental atelectasis. Signed: Aaron Patino MDReport Verified Date/Time: 
2021 15:35:10 Addendum EndsFINAL REPORT PATIENT ID: [id number] CTA Aorta - 
chest with coronary calcium score and coronary CTA:  2021 11:01 PM. 
Comparison: CT chest dated 2021. Clinical History: 51 years old Male now 
referred for evaluation of coronary calcium score and for evaluation of coronary
artery anatomy and possible stenoses. Study was performed in part in order to 
avoid an invasive procedure.. Technique: Multidetector CT scanner. Images were 
obtained before and during the dynamic passage of intravenous contrast material 
with retrospective ECG gating. 3D volume-rendering and multiplanar 
reconstructions were performed interactively by the interpreting physician using
anindependent (OWM) workstation. Please refer to the contrast sheet scanned 
in the EPIC system for the amount and route of contrast given. This exam was 
performed according to our departmental dose-optimisation programme, which 
includes automated exposure control, adjustment of the mA and/or kV according to
patient size and/or use of iterative reconstruction technique. Dose modulation, 
iterative reconstruction, and/or weight based adjustment of the mA/kV was 
utilized to reduce the radiation dose toas low as reasonably achievable. 
Findings: The visualized thyroid gland appears normal. The chest wall, 
mediastinum, and pericardium are unremarkable. The pulmonary arteries appear 
normal. No significant adenopathy is identified in the axilla, mediastinum, and 
shani. Lung windows reveal no acute abnormalities, except for mild bibasilar 
atelectasis. There is no pulmonary parenchymal mass, infiltrate, or pleural 
effusion. The cardiac chambers demonstrate normal atrioventricular and 
ventriculoarterial concordance, and systemic and pulmonary venous return. The 
cardiac chamber sizes are notable for mild biatrial enlargement. The aortic 
valve is trileaflet, and free from calcifications. The ascending thoracic aorta 
and aortic arch is normal in course, caliber, and contour. The arch vessel 
branching pattern is normal. The imaged arch branch vessels are patent 
proximally. There is no acute aortic pathology, such as dissection, intramural 
hematoma, or contained rupture. Calcium score and high-resolution,ECG 
synchronized computed tomography of the heart with attention to the coronary 
arteries was performed. Coronary calcification was analyzed using the OWM 
system software. These are the results of the calcificate evaluation (threshold 
= 130 HU): LM: Agatston = 0Volume = 0LAD:Agatston = 182Volume = 151LCX:Agatston 
= 40Volume = 48RCA:Agatston = 278Volume = 
271*****************************************
***********************************Total:Agatston = 500Volume = 470 Reference 
ranges for calcium scores are provided as follows ([facility] Clin Proc 1999; 74: 243-
252): 0-10: minimal [id number]:mild [id number] calcium&gt; 
400significant calcium This calcium score places the patient is &gt; 95th 
percentile for age group. Coronary CT angiogram was performed using standard 
methodology and images analyzed using [employer] software package. CORONARY ANATOMY:
Origins: Normal coronary artery origins.Left Main Coronary Artery: The LM is a 
normal sized vessel that bifurcates into the LAD and LCx. There is no 
significant atherosclerotic change or stenotic disease. Left Anterior Descending
Coronary Artery: The LAD is a normal sized vessel that wraps around the apex. It
gives rise to 2 acute diagonal branches. There is mild to moderate eccentric 
calcification involving the proximal to mid LAD and focal calcification at the 
distal LAD. There is mild (25 -49%)luminal stenosis involving the ostium of the 
LAD with mixed calcific atherosclerotic changes, otherwise the remaining LAD is 
widely patent withminimal luminal irregularities. Left Circumflex Coronary 
Artery: The LCX is a normal sized vessel, which is non-dominant. It gives rise 
to 2 obtuse marginal branches. There is mild focal eccentric calcification at 
the mid circumflex, otherwise there is no significant luminal stenosis noted. 
Right Coronary Artery: The RCA is a normal sized vessel, which is dominant. It 
gives rise to a small conus, basim SA tatyana branch, and 1 acute marginal 
branches. In its distal segment it bifurcates into a PDA andRPL branch. There is
scattered focal calcification with luminal irregularities noted, however there i
s no significant luminal stenosis visualized. ABDOMEN:The limited images of the 
upper abdomen revealliver cirrhosis with evidence of TIPS. Conclusions: 1. 
Quantitative coronary artery calcium Agaston score of 500 and volume score of 
470. This calcium score places the patient is &gt; 95th percentile for age 
group. Coronary arteries have normal origins, and normal branching patterns. The
right coronary artery is dominant. 2. There is mild to moderate eccentric 
calcification involving the proximal to mid LAD and focal calcification at the 
distal LAD. There is mild (25 -49%) luminal stenosis involvingthe ostium of the 
LAD with mixed calcific atherosclerotic changes, otherwise the remaining LAD is 
widely patent with minimal luminal irregularities 3. The left circumflex, and 
right coronary arteries are all widely patent with scattered eccentric mild 
calcification with no significant luminal stenosis. 4. The imaged thoracic aorta
is normal in course, caliber, and contour. 5. Cirrhotic liver with evidence of 
TIPS. An addendum will be dictated regarding the non-vascular findings by the 
Consultant Radiologist. Signed: Jericho Coopereport Verified Date/Time: 
2021 15:08:54 Reading Location:[place] [address] CT Reading Room Electronically 
signed by: AARON PATINO MD on 2021 03:35 PMT SPOT [id number] 
11:53:00





             Test Item    Value        Reference Range Interpretation Comments

 

             T-SPOT TB (BEAKER) (test code = Negative                           

    



             1683)                                               

 

             NEG CONTROL SPOT COUNT (BEAKER) 0                                  

    



             (test code = 1684)                                        

 

             PANEL A SPOT (BEAKER) (test code = 0                               

       



             1685)                                               

 

             PANEL B SPOT (BEAKER) (test code = 0                               

       



             1686)                                               

 

             POS CONTROL SPOT CT (BEAKER) (test 0                               

       



             code = 1687)                                        

 

             SCAN RESULT (test code = 9765114)                                  

      



[id number] 11:38:00





             Test Item    Value        Reference Range Interpretation Comments

 

             T3 TOTAL (BEAKER) (test code = 656) 64 ng/dL                 

        



Reference Range  ng/dLCBC W/PLT COUNT &amp; AUTO [id number] 
05:18:00





             Test Item    Value        Reference Range Interpretation Comments

 

             WHITE BLOOD CELL COUNT 4.1 K/ L     3.5-10.5                  



             (BEAKER) (test code =                                        



             775)                                                

 

             RED BLOOD CELL COUNT 2.95 M/ L    4.63-6.08    L            



             (BEAKER) (test code =                                        



             761)                                                

 

             HEMOGLOBIN (BEAKER) 10.7 GM/DL   13.7-17.5    L            



             (test code = 410)                                        

 

             HEMATOCRIT (BEAKER) 30.7 %       40.1-51.0    L            



             (test code = 411)                                        

 

             MEAN CORPUSCULAR 104.1 fL     79.0-92.2    H            Discordant 

MCV



             VOLUME (BEAKER) (test                                        result

s compared to



             code = 753)                                         previous result

s;



                                                                 clinical correl

ation



                                                                 required.

 

             MEAN CORPUSCULAR 36.3 pg      25.7-32.2    H            



             HEMOGLOBIN (BEAKER)                                        



             (test code = 751)                                        

 

             MEAN CORPUSCULAR 34.9 GM/DL   32.3-36.5                 



             HEMOGLOBIN CONC                                        



             (BEAKER) (test code =                                        



             752)                                                

 

             RED CELL DISTRIBUTION 14.4 %       11.6-14.4                 



             WIDTH (BEAKER) (test                                        



             code = 412)                                         

 

             PLATELET COUNT 111 K/CU MM  150-450      L            



             (BEAKER) (test code =                                        



             756)                                                

 

             MEAN PLATELET VOLUME 11.9 fL      9.4-12.4                  



             (BEAKER) (test code =                                        



             754)                                                

 

             NUCLEATED RED BLOOD 0 /100 WBC   0-0                       



             CELLS (BEAKER) (test                                        



             code = 413)                                         

 

             NEUTROPHILS RELATIVE 50 %                                   



             PERCENT (BEAKER) (test                                        



             code = 429)                                         

 

             LYMPHOCYTES RELATIVE 28 %                                   



             PERCENT (BEAKER) (test                                        



             code = 430)                                         

 

             MONOCYTES RELATIVE 12 %                                   



             PERCENT (BEAKER) (test                                        



             code = 431)                                         

 

             EOSINOPHILS RELATIVE 9 %                                    



             PERCENT (BEAKER) (test                                        



             code = 432)                                         

 

             BASOPHILS RELATIVE 1 %                                    



             PERCENT (BEAKER) (test                                        



             code = 437)                                         

 

             NEUTROPHILS ABSOLUTE 2.04 K/ L    1.78-5.38                 



             COUNT (BEAKER) (test                                        



             code = 670)                                         

 

             LYMPHOCYTES ABSOLUTE 1.16 K/ L    1.32-3.57    L            



             COUNT (BEAKER) (test                                        



             code = 414)                                         

 

             MONOCYTES ABSOLUTE 0.50 K/ L    0.30-0.82                 



             COUNT (BEAKER) (test                                        



             code = 415)                                         

 

             EOSINOPHILS ABSOLUTE 0.37 K/ L    0.04-0.54                 



             COUNT (BEAKER) (test                                        



             code = 416)                                         

 

             BASOPHILS ABSOLUTE 0.04 K/ L    0.01-0.08                 



             COUNT (BEAKER) (test                                        



             code = 417)                                         

 

             IMMATURE     0 %          0-1                       



             GRANULOCYTES-RELATIVE                                        



             PERCENT (BEAKER) (test                                        



             code = 2801)                                        



COMPREHENSIVE METABOLIC [id number] 05:10:00





             Test Item    Value        Reference Range Interpretation Comments

 

             TOTAL PROTEIN 5.5 gm/dL    6.0-8.3      L            



             (BEAKER) (test code =                                        



             770)                                                

 

             ALBUMIN (BEAKER) 1.7 g/dL     3.5-5.0      L            



             (test code = 1145)                                        

 

             ALKALINE PHOSPHATASE 101 U/L                          



             (BEAKER) (test code =                                        



             346)                                                

 

             BILIRUBIN TOTAL 4.9 mg/dL    0.2-1.2      H            



             (BEAKER) (test code =                                        



             377)                                                

 

             SODIUM (BEAKER) (test 134 meq/L    136-145      L            



             code = 381)                                         

 

             POTASSIUM (BEAKER) 3.5 meq/L    3.5-5.1                   



             (test code = 379)                                        

 

             CHLORIDE (BEAKER) 105 meq/L                        



             (test code = 382)                                        

 

             CO2 (BEAKER) (test 24 meq/L     22-29                     



             code = 355)                                         

 

             BLOOD UREA NITROGEN 6 mg/dL      7-21         L            



             (BEAKER) (test code =                                        



             354)                                                

 

             CREATININE (BEAKER) 0.72 mg/dL   0.57-1.25                 



             (test code = 358)                                        

 

             GLUCOSE RANDOM 100 mg/dL                        



             (BEAKER) (test code =                                        



             652)                                                

 

             CALCIUM (BEAKER) 7.0 mg/dL    8.4-10.2     L            



             (test code = 697)                                        

 

             AST (SGOT) (BEAKER) 73 U/L       5-34         H            



             (test code = 353)                                        

 

             ALT (SGPT) (BEAKER) 40 U/L       6-55                      



             (test code = 347)                                        

 

             EGFR (BEAKER) (test 115                                    ESTIMATE

D GFR IS



             code = 1092) mL/min/1.73 sq                           NOT AS ACCURA

TE AS



                          m                                      CREATININE



                                                                 CLEARANCE IN



                                                                 PREDICTING



                                                                 GLOMERULAR



                                                                 FILTRATION RATE

.



                                                                 ESTIMATED GFR I

S



                                                                 NOT APPLICABLE 

FOR



                                                                 DIALYSIS PATIEN

TS.



 ID - EDASISpecimen moderately ictericPROTHROMBIN TIME/[id number] 
04:53:00





             Test Item    Value        Reference Range Interpretation Comments

 

             PROTIME (BEAKER) (test code = 25.8 seconds 11.9-14.2    H          

  



             759)                                                

 

             INR (BEAKER) (test code = 370) 2.45         <=5.90                 

   



Effective 2019: PT Reference Range ChangeNew: 11.9-14.2 Previous: 11.7-
14.7RECOMMENDED COUMADIN/WARFARIN INR THERAPY RANGESSTANDARD DOSE: 2.0-3.0 
Includes: PROPHYLAXIS for venous thrombosis, systemic embolization; TREATMENT 
for venous thrombosis and/or pulmonary embolus.HIGH RISK: Target INR is 2.5-3.5 
for patients wiht mechanical heart valves.COMPREHENSIVE METABOLIC PANEL
2021 06:06:00





             Test Item    Value        Reference Range Interpretation Comments

 

             TOTAL PROTEIN 6.2 gm/dL    6.0-8.3                   



             (BEAKER) (test code =                                        



             770)                                                

 

             ALBUMIN (BEAKER) 1.8 g/dL     3.5-5.0      L            



             (test code = 1145)                                        

 

             ALKALINE PHOSPHATASE 112 U/L                          



             (BEAKER) (test code =                                        



             346)                                                

 

             BILIRUBIN TOTAL 5.6 mg/dL    0.2-1.2      H            



             (BEAKER) (test code =                                        



             377)                                                

 

             SODIUM (BEAKER) (test 132 meq/L    136-145      L            



             code = 381)                                         

 

             POTASSIUM (BEAKER) 3.8 meq/L    3.5-5.1                   



             (test code = 379)                                        

 

             CHLORIDE (BEAKER) 104 meq/L                        



             (test code = 382)                                        

 

             CO2 (BEAKER) (test 24 meq/L     22-29                     



             code = 355)                                         

 

             BLOOD UREA NITROGEN 9 mg/dL      7-21                      



             (BEAKER) (test code =                                        



             354)                                                

 

             CREATININE (BEAKER) 0.81 mg/dL   0.57-1.25                 



             (test code = 358)                                        

 

             GLUCOSE RANDOM 102 mg/dL                        



             (BEAKER) (test code =                                        



             652)                                                

 

             CALCIUM (BEAKER) 7.2 mg/dL    8.4-10.2     L            



             (test code = 697)                                        

 

             AST (SGOT) (BEAKER) 88 U/L       5-34         H            



             (test code = 353)                                        

 

             ALT (SGPT) (BEAKER) 47 U/L       6-55                      



             (test code = 347)                                        

 

             EGFR (BEAKER) (test 100                                    ESTIMATE

D GFR IS



             code = 1092) mL/min/1.73 sq                           NOT AS ACCURA

TE AS



                          m                                      CREATININE



                                                                 CLEARANCE IN



                                                                 PREDICTING



                                                                 GLOMERULAR



                                                                 FILTRATION RATE

.



                                                                 ESTIMATED GFR I

S



                                                                 NOT APPLICABLE 

FOR



                                                                 DIALYSIS PATIEN

TS.



 ID - EDASISpecimen moderately ictericCBC W/PLT COUNT &amp; AUTO 
[id number] 05:40:00





             Test Item    Value        Reference Range Interpretation Comments

 

             WHITE BLOOD CELL COUNT (BEAKER) 6.2 K/ L     3.5-10.5              

    



             (test code = 775)                                        

 

             RED BLOOD CELL COUNT (BEAKER) 3.33 M/ L    4.63-6.08    L          

  



             (test code = 761)                                        

 

             HEMOGLOBIN (BEAKER) (test code = 12.0 GM/DL   13.7-17.5    L       

     



             410)                                                

 

             HEMATOCRIT (BEAKER) (test code = 36.0 %       40.1-51.0    L       

     



             411)                                                

 

             MEAN CORPUSCULAR VOLUME (BEAKER) 108.1 fL     79.0-92.2    H       

     



             (test code = 753)                                        

 

             MEAN CORPUSCULAR HEMOGLOBIN 36.0 pg      25.7-32.2    H            



             (BEAKER) (test code = 751)                                        

 

             MEAN CORPUSCULAR HEMOGLOBIN CONC 33.3 GM/DL   32.3-36.5            

     



             (BEAKER) (test code = 752)                                        

 

             RED CELL DISTRIBUTION WIDTH 14.6 %       11.6-14.4    H            



             (BEAKER) (test code = 412)                                        

 

             PLATELET COUNT (BEAKER) (test 128 K/CU MM  150-450      L          

  



             code = 756)                                         

 

             MEAN PLATELET VOLUME (BEAKER) 11.7 fL      9.4-12.4                

  



             (test code = 754)                                        

 

             NUCLEATED RED BLOOD CELLS 0 /100 WBC   0-0                       



             (BEAKER) (test code = 413)                                        

 

             NEUTROPHILS RELATIVE PERCENT 57 %                                  

 



             (BEAKER) (test code = 429)                                        

 

             LYMPHOCYTES RELATIVE PERCENT 21 %                                  

 



             (BEAKER) (test code = 430)                                        

 

             MONOCYTES RELATIVE PERCENT 10 %                                   



             (BEAKER) (test code = 431)                                        

 

             EOSINOPHILS RELATIVE PERCENT 10 %                                  

 



             (BEAKER) (test code = 432)                                        

 

             BASOPHILS RELATIVE PERCENT 1 %                                    



             (BEAKER) (test code = 437)                                        

 

             NEUTROPHILS ABSOLUTE COUNT 3.48 K/ L    1.78-5.38                 



             (BEAKER) (test code = 670)                                        

 

             LYMPHOCYTES ABSOLUTE COUNT 1.31 K/ L    1.32-3.57    L            



             (BEAKER) (test code = 414)                                        

 

             MONOCYTES ABSOLUTE COUNT (BEAKER) 0.64 K/ L    0.30-0.82           

      



             (test code = 415)                                        

 

             EOSINOPHILS ABSOLUTE COUNT 0.63 K/ L    0.04-0.54    H            



             (BEAKER) (test code = 416)                                        

 

             BASOPHILS ABSOLUTE COUNT (BEAKER) 0.07 K/ L    0.01-0.08           

      



             (test code = 417)                                        

 

             IMMATURE GRANULOCYTES-RELATIVE 1 %          0-1                    

   



             PERCENT (BEAKER) (test code =                                      

  



             2801)                                               



PROTHROMBIN TIME/[id number] 05:23:00





             Test Item    Value        Reference Range Interpretation Comments

 

             PROTIME (BEAKER) (test code = 24.0 seconds 11.9-14.2    H          

  



             759)                                                

 

             INR (BEAKER) (test code = 370) 2.21         <=5.90                 

   



Effective 2019: PT Reference Range ChangeNew: 11.9-14.2 Previous: 11.7-
14.7RECOMMENDED COUMADIN/WARFARIN INR THERAPY RANGESSTANDARD DOSE: 2.0-3.0 
Includes: PROPHYLAXIS for venous thrombosis, systemic embolization; TREATMENT 
for venous thrombosis and/or pulmonary embolus.HIGH RISK: Target INR is 2.5-3.5 
for patients wiht mechanical heart valves.COMPREHENSIVE METABOLIC PANEL
2021 06:43:00





             Test Item    Value        Reference Range Interpretation Comments

 

             TOTAL PROTEIN 5.7 gm/dL    6.0-8.3      L            



             (BEAKER) (test code =                                        



             770)                                                

 

             ALBUMIN (BEAKER) 1.7 g/dL     3.5-5.0      L            



             (test code = 1145)                                        

 

             ALKALINE PHOSPHATASE 77 U/L                           



             (BEAKER) (test code =                                        



             346)                                                

 

             BILIRUBIN TOTAL 6.5 mg/dL    0.2-1.2      H            



             (BEAKER) (test code =                                        



             377)                                                

 

             SODIUM (BEAKER) (test 134 meq/L    136-145      L            



             code = 381)                                         

 

             POTASSIUM (BEAKER) 3.7 meq/L    3.5-5.1                   



             (test code = 379)                                        

 

             CHLORIDE (BEAKER) 106 meq/L                        



             (test code = 382)                                        

 

             CO2 (BEAKER) (test 23 meq/L     22-29                     



             code = 355)                                         

 

             BLOOD UREA NITROGEN 8 mg/dL      7-21                      



             (BEAKER) (test code =                                        



             354)                                                

 

             CREATININE (BEAKER) 0.70 mg/dL   0.57-1.25                 



             (test code = 358)                                        

 

             GLUCOSE RANDOM 82 mg/dL                         



             (BEAKER) (test code =                                        



             652)                                                

 

             CALCIUM (BEAKER) 7.1 mg/dL    8.4-10.2     L            



             (test code = 697)                                        

 

             AST (SGOT) (BEAKER) 85 U/L       5-34         H            



             (test code = 353)                                        

 

             ALT (SGPT) (BEAKER) 44 U/L       6-55                      



             (test code = 347)                                        

 

             EGFR (BEAKER) (test 119                                    ESTIMATE

D GFR IS



             code = 1092) mL/min/1.73 sq                           NOT AS ACCURA

TE AS



                          m                                      CREATININE



                                                                 CLEARANCE IN



                                                                 PREDICTING



                                                                 GLOMERULAR



                                                                 FILTRATION RATE

.



                                                                 ESTIMATED GFR I

S



                                                                 NOT APPLICABLE 

FOR



                                                                 DIALYSIS PATIEN

TS.



 ID - ADMINSpecimen moderately ictericPROTHROMBIN TIME/[id number] 
06:38:00





             Test Item    Value        Reference Range Interpretation Comments

 

             PROTIME (BEAKER) (test code = 24.9 seconds 11.9-14.2    H          

  



             759)                                                

 

             INR (BEAKER) (test code = 370) 2.32         <=5.90                 

   



Effective 2019: PT Reference Range ChangeNew: 11.9-14.2 Previous: 11.7-
14.7RECOMMENDED COUMADIN/WARFARIN INR THERAPY RANGESSTANDARD DOSE: 2.0-3.0 
Includes: PROPHYLAXIS for venous thrombosis, systemic embolization; TREATMENT 
for venous thrombosis and/or pulmonary embolus.HIGH RISK: Target INR is 2.5-3.5 
for patients wiht mechanical heart valves.CBC W/PLT COUNT &amp; AUTO 
[id number] 06:14:00





             Test Item    Value        Reference Range Interpretation Comments

 

             WHITE BLOOD CELL COUNT (BEAKER) 4.0 K/ L     3.5-10.5              

    



             (test code = 775)                                        

 

             RED BLOOD CELL COUNT (BEAKER) 2.93 M/ L    4.63-6.08    L          

  



             (test code = 761)                                        

 

             HEMOGLOBIN (BEAKER) (test code = 10.6 GM/DL   13.7-17.5    L       

     



             410)                                                

 

             HEMATOCRIT (BEAKER) (test code = 31.4 %       40.1-51.0    L       

     



             411)                                                

 

             MEAN CORPUSCULAR VOLUME (BEAKER) 107.2 fL     79.0-92.2    H       

     



             (test code = 753)                                        

 

             MEAN CORPUSCULAR HEMOGLOBIN 36.2 pg      25.7-32.2    H            



             (BEAKER) (test code = 751)                                        

 

             MEAN CORPUSCULAR HEMOGLOBIN CONC 33.8 GM/DL   32.3-36.5            

     



             (BEAKER) (test code = 752)                                        

 

             RED CELL DISTRIBUTION WIDTH 14.5 %       11.6-14.4    H            



             (BEAKER) (test code = 412)                                        

 

             PLATELET COUNT (BEAKER) (test 105 K/CU MM  150-450      L          

  



             code = 756)                                         

 

             MEAN PLATELET VOLUME (BEAKER) 11.8 fL      9.4-12.4                

  



             (test code = 754)                                        

 

             NUCLEATED RED BLOOD CELLS 0 /100 WBC   0-0                       



             (BEAKER) (test code = 413)                                        

 

             NEUTROPHILS RELATIVE PERCENT 54 %                                  

 



             (BEAKER) (test code = 429)                                        

 

             LYMPHOCYTES RELATIVE PERCENT 22 %                                  

 



             (BEAKER) (test code = 430)                                        

 

             MONOCYTES RELATIVE PERCENT 14 %                                   



             (BEAKER) (test code = 431)                                        

 

             EOSINOPHILS RELATIVE PERCENT 9 %                                   

 



             (BEAKER) (test code = 432)                                        

 

             BASOPHILS RELATIVE PERCENT 1 %                                    



             (BEAKER) (test code = 437)                                        

 

             NEUTROPHILS ABSOLUTE COUNT 2.17 K/ L    1.78-5.38                 



             (BEAKER) (test code = 670)                                        

 

             LYMPHOCYTES ABSOLUTE COUNT 0.88 K/ L    1.32-3.57    L            



             (BEAKER) (test code = 414)                                        

 

             MONOCYTES ABSOLUTE COUNT (BEAKER) 0.55 K/ L    0.30-0.82           

      



             (test code = 415)                                        

 

             EOSINOPHILS ABSOLUTE COUNT 0.36 K/ L    0.04-0.54                 



             (BEAKER) (test code = 416)                                        

 

             BASOPHILS ABSOLUTE COUNT (BEAKER) 0.05 K/ L    0.01-0.08           

      



             (test code = 417)                                        

 

             IMMATURE GRANULOCYTES-RELATIVE 1 %          0-1                    

   



             PERCENT (BEAKER) (test code =                                      

  



             2801)                                               



DELL CUEVA [id number] 17:05:00Referring: Dr. Qiana Liu Reason for 
exam:-&gt;Eval TIPS stenosis
************************************************************[facility]  : 1970 Sex: 
M************************************************************FINAL REPORT 
PATIENT ID: [id number] History: Cirrhosis, portal hypertension, decreased 
velocities within indwelling TIPS concerning for stenosis. Modality: Sonography 
and fluoroscopy. Sedation: Moderate sedation was administered. 1.5 mg of Versed 
and 75 mcg of fentanyl IV was used for moderate sedation monitored under my 
direction. Total intra-service time of sedation was 45 minutes. The patient's 
vital signs were monitored throughout the procedure and recorded in the 
patient's medical record by the nurse. : Dheeraj Durán MD. 
Assistant: None. Approach: Right internal jugular vein Estimated blood loss: 
&lt; 5 cc. Specimen: None. Fluoroscopy Time: 6.0 min.Reference Air Kerma (Ka, 
r): 184.4 mGy. Technique: Informed written consent was obtained. Discussion of 
risks, benefits, and alternatives were made with the patient. The patient 
expressed understanding and agreed to proceed. A universal timeout was performed
prior to starting the procedure. All elements maximal sterile barrier technique 
was utilized for this procedure, including utilization of sterile scrub solution
for skin prep,a large sterile sheet to cover the areas of the patient that were 
not prepped, and hand hygiene, mask, head covering, and sterile gown for 
performing radiologist and scrub technologist. Initial ultrasound images 
demonstrate patent right internal jugular vein. Using ultrasound guidance, 
following acquisition of permanent images, the right internal jugular vein was 
accessed using a 21-gauge micropuncture needle. A 0.018 inch wire was advanced 
into the SVC. The needle was exchanged for a 4 Liberian micropuncture sheath. The 
micropuncture sheath was exchanged over a 0.035 Bentson wire for a 7 Liberian x 10
cm vascular sheath. Using a 5 Liberian H1 catheter and 0.035 angled Glidewire, the
TIPS shunt was cannulated and catheter manipulated into the main portal vein. A 
portogram was performed and portosystemic pressure measurements were obtained. 
The stenosis within the hepatic venous end of the stent was then angioplastied 
using a 10 mm high-pressure balloon. Post angioplasty portogram was performed 
and repeat pressure measurements obtained. All catheters and wires removed. The 
sheath was removed and hemostasis achieved with manual compression. A sterile 
dressing was applied. The patient tolerated procedure without immediate 
complication. FINDINGS/IMPRESSION: Portogram demonstrates patent TIPS with focal
stenosis towards the the hepatic venous end which was successfully treated using
a 10 mm high-pressure balloon. Repeat portogram demonstrates patency of the TIPS
with no significant residual stenosis.Pressure measurements as follows: Pre-
angioplasty:Main portal vein - 24 mmHgRight atrium - 6 mmHg Post-
angioplasty:Main portal vein - 18 mmHgRight atrium - 8 mmHg Signed: Dheeraj Durán MDReport Verified Date/Time: 2021 17:05:16 Reading Location: [place] [address] Angio Body Reading Room Electronicallysigned by: DHEERAJ DURÁN MD on 
2021 05:05 PMPET/CT, CARDIAC PERF REST AND [id number] 16:10:00
Referring: Dr. Qiana Joshi for exam:-&gt;liver transplant evaluation
************************************************************[facility] : 1970 Sex: 
M************************************************************FINAL REPORT 
PATIENT ID: [id number] PROCEDURE: MYOCARDIAL PERFUSION PET IMAGING 
(Rest/Stress)CPT CODE: 38282 INDICATION: Evaluation for CAD prior to liver 
transplant CARDIOVASCULAR PROFILE:CAD History: NoneSymptoms: NoneRisk Factors: 
NoneBMI: 27.2Medications: None STRESS PROTOCOL:Pharmacologic stress wasachieved 
with a 10-second intravenous infusion of regadenoson 0.4 mg. The 
radiopharmaceutical was administered 30 seconds after the start of the 
regadenoson infusion. IMAGING PROTOCOL:Limited low-dose CT imaging was performed
for attenuation correction. 40.1 mCi of Rb-82 chloride was injected intraveno
usly at rest, and gated PET images were obtained. Then, 40.1 mCi of Rb-82 
chloride was injected intravenously at peak stress, and gated PET images were 
obtained. Image quality is good. REST FINDINGS:HR: 85/minBP: 106/50 mmHgPrelim. 
EKG: Normal sinus rhythm.Perfusion: there is a small mild, defect in the apex 
and apical anterior wall.Wall Motion: Normal (LVEF greater than 70%).LV Volume: 
Normal.RV Volume: Normal. STRESS FINDINGS:HR: 96/min (54% of MPHR)BP: 105/52 
mmHgPrelim. EKG: No ischemic changes.Symptoms: None (treatment not 
required).Perfusion: there is a small, moderate severity defect in the apex and 
apical anterior wall.Wall Motion: Normal (LVEF greater than 70%).LV Volume: Not 
significantly changed from rest. IMPRESSION:1. Abnormal study.2. Abnormal 
myocardial perfusion. There is a small size, moderate severity, mostly 
reversible perfusion abnormality in the apex and apical anterior LV.3. Normal 
resting LVEF, which does not deteriorate with pharmacologic stress.4. Normal 
extracardiac tracer distribution.5. There is no prior study for comparison. 
Signed: Anthony Laughlin MDReport Verified Date/Time: 2021 16:10:19 Reading 
Location: [address] Reading Room Electronically signed by: 
ANTHONY LAUGHLIN MD on 2021 04:10 PMALBUMIN PLEURAL [id number] 
14:17:00





             Test Item    Value        Reference Range Interpretation Comments

 

             ALBUMIN, PLEURAL FLUID                                        ALBUM

IN, PLEURAL FLUID =



             (BEAKER) (test code =                                        0.4 g/

dLTEST PERFORMED AT



             5851657)                                            [facility]



PROTEIN, BODY [id number] 14:15:00





             Test Item    Value        Reference Range Interpretation Comments

 

             PROTEIN FLUID (BEAKER)                                        PROTE

IN, PLEURAL = 1.0



             (test code = 579)                                        g/dLTEST P

ERFORMED AT



                                                                 [facility]



Absence of reference range indicates that normals have not been defined.Assay 
performance has not been validated for this type of specimen.RUBELLA ANTIBODY, 
[id number] 12:43:00





             Test Item    Value        Reference Range Interpretation Comments

 

             RUBELLA IGG QUANTITATION (BEAKER) 19.0 IU/mL   <8.0         H      

      



             (test code = 572)                                        



Rubella IgG Result Interpretation: &lt;/= 7.0 IU/mL Negative - Presumed non-
immune 8.0 - 9.9 IU/mL Equivocal &gt;= 10.0 IU/mL Positive - Presumed immune
VARICELLA ZOSTER ANTIBODY, [id number] 12:43:00





             Test Item    Value        Reference Range Interpretation Comments

 

             VARICELLA ZOSTER IGG (AL) (BEAKER) >                               

       



             (test code = 3197)                                        



VARICELLA ZOSTER RESULT INTERPRETATIONS: &lt;=0.8 Al Nonreactive: Presumed non-
immune to VZV 0.9-1.0Al Equivocal &gt;=1.1 Al Reactive: Presumed immune to VZV
U/S, ABDOMINAL, [id number] 12:34:00Referring: Dr. Qiana Liu Labs to
be ordered:-&gt;No Labs Needed Reason for exam:-&gt;therapeutic para
************************************************************[facility] 1970 Sex: 
M************************************************************FINAL REPORT 
PATIENT ID: [id number] History: Ascites. PROCEDURE: Limited sonographic 
examination of theabdomen was performed in preparation for planned ultrasound-
guided paracentesis. Limited sonographicexamination of the abdomen showed only a
trace amount of fluid, primarily in the right lower quadrant. Therefore, 
paracentesis was not performed. IMPRESSION: 1. Trace ascites, not enough for 
planned therapeutic paracentesis. Signed: Dheeraj Durán Verified 
Date/Time: 2021 12:34:52 Reading Location: [address] Ultrasound Reading
Room Electronically signed by: DHEERAJ DURÁN MD on 2021 12:34 PMEBV 
ANTIBODY, [id number] 11:36:00





             Test Item    Value        Reference Range Interpretation Comments

 

             RUPESH PEREZ VIRAL CAPSID Positive     Negative, Equivocal A       

     



             ANTIGEN IGG (BEAKER) (test code                                    

    



             = 3415)                                             



Rupesh Perez Viral Capsid Antigen IgG Result Interpretation: &lt;/= 0.8 Al 
Negative 0.9-1.0 Al Equivocal &gt;/= 1.1 Al [id number] 11:35:00





             Test Item    Value        Reference Range Interpretation Comments

 

             RPR SCREEN (BEAKER) (test code = Nonreactive  Nonreactive          

     



             420)                                                



CRYPTOCOCCAL [id number] 11:35:00





             Test Item    Value        Reference Range Interpretation Comments

 

             CRYPTOCOCCAL ANTIGEN, SERUM Negative     Negative, Interference    

          



             (BEAKER) (test code = 1828)                                        



CYTOMEGALOVIRUS ANTIBODY, [id number] 10:00:00





             Test Item    Value        Reference Range Interpretation Comments

 

             CYTOMEGALOVIRUS, IGG (BEAKER) Positive     Negative, Equivocal A   

         



             (test code = 3429)                                        



CMV IgG Result Interpretation: &lt;/= 0.8 Al Negative 0.9-1.0 Al Equivocal 
&gt;/=1.1 Al PositiveCYTOMEGALOVIRUS ANTIBODY, [id number] 10:00:00





             Test Item    Value        Reference Range Interpretation Comments

 

             CYTOMEGALOVIRUS IGM ANTIBODY Negative     Negative, Equivocal      

        



             (BEAKER) (test code = 3437)                                        



CMV IgM Result Interpretation: &lt;/= 0.8 Al Negative 0.9-1.0 Al Equivocal 
&gt;/= 1.1 Al PositiveEBV ANTIBODY, [id number] 10:00:00





             Test Item    Value        Reference Range Interpretation Comments

 

             RUPESH PEREZ VIRAL CAPSID Negative     Negative, Equivocal         

     



             ANTIGEN IGM (BEAKER) (test code                                    

    



             = 3418)                                             



Rupesh Perez Viral Capsid Antigen IgM Result Interpretation: &lt;/= 0.8 Al 
Negative 0.9-1.0 Al Equivocal &gt;/= 1.1 Al PositiveCOMPREHENSIVE METABOLIC 
[id number] 06:31:00





             Test Item    Value        Reference Range Interpretation Comments

 

             TOTAL PROTEIN 6.0 gm/dL    6.0-8.3                   



             (BEAKER) (test code =                                        



             770)                                                

 

             ALBUMIN (BEAKER) 1.8 g/dL     3.5-5.0      L            



             (test code = 1145)                                        

 

             ALKALINE PHOSPHATASE 89 U/L                           



             (BEAKER) (test code =                                        



             346)                                                

 

             BILIRUBIN TOTAL 7.2 mg/dL    0.2-1.2      H            



             (BEAKER) (test code =                                        



             377)                                                

 

             SODIUM (BEAKER) (test 130 meq/L    136-145      L            



             code = 381)                                         

 

             POTASSIUM (BEAKER) 3.8 meq/L    3.5-5.1                   



             (test code = 379)                                        

 

             CHLORIDE (BEAKER) 102 meq/L                        



             (test code = 382)                                        

 

             CO2 (BEAKER) (test 24 meq/L     22-29                     



             code = 355)                                         

 

             BLOOD UREA NITROGEN 7 mg/dL      7-21                      



             (BEAKER) (test code =                                        



             354)                                                

 

             CREATININE (BEAKER) 0.75 mg/dL   0.57-1.25                 



             (test code = 358)                                        

 

             GLUCOSE RANDOM 83 mg/dL                         



             (BEAKER) (test code =                                        



             652)                                                

 

             CALCIUM (BEAKER) 7.3 mg/dL    8.4-10.2     L            



             (test code = 697)                                        

 

             AST (SGOT) (BEAKER) 90 U/L       5-34         H            



             (test code = 353)                                        

 

             ALT (SGPT) (BEAKER) 49 U/L       6-55                      



             (test code = 347)                                        

 

             EGFR (BEAKER) (test 110                                    ESTIMATE

D GFR IS



             code = 1092) mL/min/1.73 sq                           NOT AS ACCURA

TE AS



                          m                                      CREATININE



                                                                 CLEARANCE IN



                                                                 PREDICTING



                                                                 GLOMERULAR



                                                                 FILTRATION RATE

.



                                                                 ESTIMATED GFR I

S



                                                                 NOT APPLICABLE 

FOR



                                                                 DIALYSIS PATIEN

TS.



 ID - ANGELIQUE WSpecimen moderately ictericPROTHROMBIN TIME/[id number] 
04:57:00





             Test Item    Value        Reference Range Interpretation Comments

 

             PROTIME (BEAKER) (test code = 26.5 seconds 11.9-14.2    H          

  



             759)                                                

 

             INR (BEAKER) (test code = 370) 2.51         <=5.90                 

   



Effective 2019: PT Reference Range ChangeNew: 11.9-14.2 Previous: 11.7-
14.7RECOMMENDED COUMADIN/WARFARIN INR THERAPY RANGESSTANDARD DOSE: 2.0-3.0 
Includes: PROPHYLAXIS for venous thrombosis, systemic embolization; TREATMENT 
for venous thrombosis and/or pulmonary embolus.HIGH RISK: Target INR is 2.5-3.5 
for patients wiht mechanical heart valves.CBC W/PLT COUNT &amp; AUTO 
[id number] 04:45:00





             Test Item    Value        Reference Range Interpretation Comments

 

             WHITE BLOOD CELL COUNT (BEAKER) 5.4 K/ L     3.5-10.5              

    



             (test code = 775)                                        

 

             RED BLOOD CELL COUNT (BEAKER) 2.94 M/ L    4.63-6.08    L          

  



             (test code = 761)                                        

 

             HEMOGLOBIN (BEAKER) (test code = 10.7 GM/DL   13.7-17.5    L       

     



             410)                                                

 

             HEMATOCRIT (BEAKER) (test code = 30.5 %       40.1-51.0    L       

     



             411)                                                

 

             MEAN CORPUSCULAR VOLUME (BEAKER) 103.7 fL     79.0-92.2    H       

     



             (test code = 753)                                        

 

             MEAN CORPUSCULAR HEMOGLOBIN 36.4 pg      25.7-32.2    H            



             (BEAKER) (test code = 751)                                        

 

             MEAN CORPUSCULAR HEMOGLOBIN CONC 35.1 GM/DL   32.3-36.5            

     



             (BEAKER) (test code = 752)                                        

 

             RED CELL DISTRIBUTION WIDTH 14.4 %       11.6-14.4                 



             (BEAKER) (test code = 412)                                        

 

             PLATELET COUNT (BEAKER) (test 112 K/CU MM  150-450      L          

  



             code = 756)                                         

 

             MEAN PLATELET VOLUME (BEAKER) 11.9 fL      9.4-12.4                

  



             (test code = 754)                                        

 

             NUCLEATED RED BLOOD CELLS 0 /100 WBC   0-0                       



             (BEAKER) (test code = 413)                                        

 

             NEUTROPHILS RELATIVE PERCENT 55 %                                  

 



             (BEAKER) (test code = 429)                                        

 

             LYMPHOCYTES RELATIVE PERCENT 21 %                                  

 



             (BEAKER) (test code = 430)                                        

 

             MONOCYTES RELATIVE PERCENT 13 %                                   



             (BEAKER) (test code = 431)                                        

 

             EOSINOPHILS RELATIVE PERCENT 8 %                                   

 



             (BEAKER) (test code = 432)                                        

 

             BASOPHILS RELATIVE PERCENT 1 %                                    



             (BEAKER) (test code = 437)                                        

 

             NEUTROPHILS ABSOLUTE COUNT 3.01 K/ L    1.78-5.38                 



             (BEAKER) (test code = 670)                                        

 

             LYMPHOCYTES ABSOLUTE COUNT 1.15 K/ L    1.32-3.57    L            



             (BEAKER) (test code = 414)                                        

 

             MONOCYTES ABSOLUTE COUNT (BEAKER) 0.73 K/ L    0.30-0.82           

      



             (test code = 415)                                        

 

             EOSINOPHILS ABSOLUTE COUNT 0.45 K/ L    0.04-0.54                 



             (BEAKER) (test code = 416)                                        

 

             BASOPHILS ABSOLUTE COUNT (BEAKER) 0.06 K/ L    0.01-0.08           

      



             (test code = 417)                                        

 

             IMMATURE GRANULOCYTES-RELATIVE 1 %          0-1                    

   



             PERCENT (BEAKER) (test code =                                      

  



             2801)                                               



SARS-COV2/RT-PCR ([facility] [place] [facility]; [facility]S)2021 21:15:00





             Test Item    Value        Reference Range Interpretation Comments

 

             SARS-COV2/RT-PCR (test Negative     Not Detected, Negative,        

      



             code = 1966640)              See external report for              



                                       linked test               

 

             SARS-COV-2 PERFORMING LAB [facility]                             



             (test code = 0838784)                                        



Negative result for this test determines that SARS-CoV-2 RNA was not present in 
the specimen above the Limit of Detection (LOD). However, Negative results do 
not preclude SARS-CoV-2 infection and should not be used as the sole basis for 
treatment or patient management decisions. Negative results must be combined 
with clinical observations, patient history, and epidemiological information. A 
false negative result may occur if a specimen is improperly collected, 
transported or handled. A false negative result should be considered if 
patient's recent exposures or clinical presentation indicate that COVID-19 
(SARS-CoV-2) is likely and diagnostic tests for other causes of illness are 
negative. Re-testing should be considered in cases of suspected false 
negatives.The limit of detection for this assay is 800 copies/mL.This SARS CoV-2
test is a real-time RT-PCR test intended for the qualitative detection of 
nucleic acid from SARS-CoV-2 in a nasopharyngeal swab specimen collected from 
individuals suspected of COVID-19 by their healthcare provider.This test has not
been Food and Drug Administration (FDA) cleared or approved. This is a modified 
version of an approved Emergency Use Authorization (EUA) and is in the process 
of review by the FDA. Once authorized by the FDA, the issued EUA will be 
effective until the declaration that circumstances exist justifying the 
authorization of the emergency use ofin vitro diagnostic tests for detection 
and/or diagnosis of COVID-19 is terminated under Section 564(b)(2) of the Act or
the EUA is revoked under Section 564(g) of the Act.Fact Sheet for Healthcare 
Prov
iders:https://www.[employer].com/sites/default/files/product/documents/Fact_Sheet_HC

_Ilbbmgihq_Vcjs_NZVJ-HzE-1.pdfFact Sheet for Healthcare 
Patients:https://www.[employer]/sites/default/files/product/docume
nts/Gvid_Zlkpk_Lgzlrmie_Tthv_DZPT-KdF-3.pdfPerforming Laboratory:[place] [address] [address] [place], [place] [id number] 
20:48:00





             Test Item    Value        Reference Range Interpretation Comments

 

             FERRITIN (BEAKER) (test code = 440.42 ng/mL 5..00  H         

   



             361)                                                



 ID - RHONDA MVITAMIN D, [id number] 19:53:00





             Test Item    Value        Reference Range Interpretation Comments

 

             VITAMIN D 25-OH (BEAKER) (test code = < ng/mL      6.6-49.9     L  

          



             2764)                                               



Effective 10/11/2017: Reference Range ChangeNew: 6.6-49.9 ng/mL Previous: 13.0-
47.8 ng/mLRecommendedVitamin D Target Range: 30.0-40.0 ng/mLOperator ID - FSE
HEPATITIS A ANTIBODY, [id number] 19:53:00





             Test Item    Value        Reference Range Interpretation Comments

 

             HEPATITIS A IGG ANTIBODY (BEAKER) Reactive     Nonreactive  A      

      



             (test code = 2797)                                        



 ID - [id number] 19:47:00





             Test Item    Value        Reference Range Interpretation Comments

 

             PROSTATE SPECIFIC ANTIGEN (BEAKER) 0.4 ng/mL    0.0-4.0            

       



             (test code = 844)                                        



 ID - FSECT, CHEST, WITHOUT [id number] 19:42:00Referring: Dr. Qiana LiuUnlisted Reason for Exam - Click Yes and Enter Reason 
Below-&gt;YesUnlisted Reason for Exam-&gt;liver transplant evaluation
************************************************************[facility] : 1970 Sex: 
M************************************************************FINAL REPORT 
PATIENT ID: [id number] TECHNIQUE: CT scan of the chest WITHOUT intravenous 
contrast. Dose modulation, iterative reconstruction, and/or weight-based 
adjustment of the mA/kV was utilized to reduce the radiation dose to as low as 
reasonably achievable. INDICATION: Unlisted Reason for Examliver transplant 
evaluation. COMPARISON: 2016 CT abdomen/pelvis. FINDINGS: ABSENCE OF 
INTRAVENOUS CONTRAST DECREASES SENSITIVITY FOR DETECTION OF FOCAL LESIONS AND 
VASCULAR PATHOLOGY. LINES/TUBES: None. LUNGS AND AIRWAYS: No consolidation or 
suspicious lesion. Tracheobronchial airways are clear. PLEURA:The pleural spaces
are clear. HEART AND MEDIASTINUM: The visualized thyroid gland is normal. No 
mediastinal, hilar, or axillary lymphadenopathy. Heart is normal size. No 
pericardial effusion or aneurysmal dilatation. SOFT TISSUES AND BONES: 
Unremarkable. UPPER ABDOMEN: Cirrhotic liver with TIPS. Moderate volume of 
hypoattenuating ascites and partially imaged spleen is enlarged. Gallstones 
present within the gallbladder IMPRESSION:1. No evidence of an acute or active 
intrathoracic abnormality. 2. 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 
hypertension. 3. Cholelithiasis. Signed: Franco Rodarte [facility] Verified 
Date/Time: 2021 19:42:49 Electronically signed by: FRANCO RODARTE MD on 
2021 07:42 PMHEPATITIS B SURFACE [id number] 19:36:00





             Test Item    Value        Reference Range Interpretation Comments

 

             HEPATITIS B SURFACE ANTIBODY < mIU/mL     <8.0                     

 



             (BEAKER) (test code = 647)                                        



 ID - FSECARCINOEMBRYONIC ANTIGEN (CEA)2021 19:36:00





             Test Item    Value        Reference Range Interpretation Comments

 

             CARCINOEMBRYONIC ANTIGEN (BEAKER) 4.1 ng/mL    0.0-5.0             

      



             (test code = 685)                                        



 ID - FSEHEPATITIS B CORE ANTIBODY, [id number] 19:36:00





             Test Item    Value        Reference Range Interpretation Comments

 

             HEPATITIS B CORE IGM ANTIBODY Nonreactive  Nonreactive             

  



             (BEAKER) (test code = 645)                                        



 ID - FSEHEPATITIS A ANTIBODY, [id number] 19:36:00





             Test Item    Value        Reference Range Interpretation Comments

 

             HEPATITIS A IGM ANTIBODY (BEAKER) Nonreactive  Nonreactive         

      



             (test code = 498)                                        



 ID - FSEURINALYSIS W/ REFLEX URINE [id number] 19:34:00





             Test Item    Value        Reference Range Interpretation Comments

 

             COLOR (BEAKER) (test code = 470) Brown                             

     

 

             CLARITY (BEAKER) (test code = 469) Clear                           

       

 

             SPECIFIC GRAVITY UA (BEAKER) (test 1.036        1.001-1.035  H     

       



             code = 468)                                         

 

             PH UA (BEAKER) (test code = 467) 6.0          5.0-8.0              

     

 

             PROTEIN UA (BEAKER) (test code = 20 mg/dL     Negative     A       

     



             464)                                                

 

             GLUCOSE UA (BEAKER) (test code = Negative     Negative             

     



             365)                                                

 

             KETONES UA (BEAKER) (test code = Negative     Negative             

     



             371)                                                

 

             BILIRUBIN UA (BEAKER) (test code = Positive     Negative     A     

       



             462)                                                

 

             BLOOD UA (BEAKER) (test code = 461) Negative     Negative          

        

 

             NITRITE UA (BEAKER) (test code = Negative     Negative             

     



             465)                                                

 

             LEUKOCYTE ESTERASE UA (BEAKER) (test Negative     Negative         

         



             code = 466)                                         

 

             UROBILINOGEN UA (BEAKER) (test code > mg/dL      0.2-1.0      H    

        



             = 463)                                              

 

             RBC UA (BEAKER) (test code = 519) 0 /HPF                           

      

 

             WBC UA (BEAKER) (test code = 520) 3 /HPF                           

      

 

             MUCUS (BEAKER) (test code = 1574) Many                             

      

 

             SOURCE(BEAKER) (test code = 2790)                                  

      



 ID - [auto] ID - techHEPATITIS B SURFACE [id number] 
19:34:00





             Test Item    Value        Reference Range Interpretation Comments

 

             HEPATITIS B SURFACE ANTIGEN (2) Nonreactive  Nonreactive           

    



             (BEAKER) (test code = 2585)                                        



Specimen is considered negative for HBsAg.HEPATITIS C [id number] 
19:34:00





             Test Item    Value        Reference Range Interpretation Comments

 

             HEPATITIS C ANTIBODY (BEAKER) Nonreactive  Nonreactive             

  



             (test code = 367)                                        



 ID - FSEHEPATITIS B CORE ANTIBODY, [id number] 19:34:00





             Test Item    Value        Reference Range Interpretation Comments

 

             HEPATITIS B CORE TOTAL ANTIBODY Nonreactive  Nonreactive           

    



             (BEAKER) (test code = 497)                                        



 ID - FSEHIV-1 ANTIGEN WITH HIV-1/2 [id number] 19:34:00





             Test Item    Value        Reference Range Interpretation Comments

 

             HIV-1 ANTIGEN WITH HIV 1\T\2 Nonreactive  Nonreactive              

 



             ANTIBODY (2) (BEAKER) (test code                                   

     



             = 2586)                                             



 ID - FSEHEMOGLOBIN [id number] 19:32:00





             Test Item    Value        Reference Range Interpretation Comments

 

             HEMOGLOBIN A1C (BEAKER) (test code = < %          4.3-6.1      L   

         



             368)                                                



MR, ABDOMEN, [id number] 18:32:00Referring: Dr. Qiana LiuUnlisted 
Reason for Exam - Click Yes and Enter Reason Below-&gt;YesUnlisted Reason for 
Exam-&gt;liver transplant evaluation
************************************************************[facility] 1970 Sex: 
M************************************************************FINAL REPORT 
PATIENT ID: [id number] MR, ABDOMEN, WITH \T\ WITHOUT CONTRAST HISTORY: Unlisted 
Reason forExamliver transplant evaluation COMPARISON: Abdominal MRI 2017 
TECHNIQUE: MRI of the abdomen was performed with and without gadolinium. 
Multiplanar, multisequence images were obtained before and following intravenous
 injection of intravenous gadolinium contrast. FINDINGS: Hepatobiliary 
Findings:Contour and signal intensity: Diffusely nodular and heterogeneous. 
Focal treated observations: None. Focal observations satisfying imaging criteria
 for HCC (LI-RADS 5): None. Focal observations at leastmildly suspicious for HCC
 (LI-RADS 4 or 3): None. Other focal observations (LI-RADS 2 or 1):Small focus 
of susceptibility artifact adjacent to the TIPS (postcontrast series image 65), 
benign but of uncertain etiology Portal vein: Patent, including a patent TIPS. 
Dilated main portal vein, 20 mm. Suggestion of mild to moderate stenosis at the 
distal TIPS near the hepatic veinArterial anatomy: Conventional.Gallbladder and 
bile ducts: Gallstones. No biliary ductal dilation or any filling defects in the
 common bile duct. Nonspecific mild apparent gallbladder wall thickening, 
underdistended gallbladder.Spleen: Markedly enlarged, 17.1 cm in long axis. A 
few punctate nonenhancing foci spleen, too small tocharacterize but 
statistically most likely benign.Varices: Small varices inferior to the spleen. 
Small varices in the abdominal wall.Ascites: Moderate ascites. Additional 
Findings:Lung bases: Trace bilateral pleural effusions.Pancreas: 
Unremarkable.Adrenals: Unremarkable.Kidneys and ureters: Unremarkable.Bowel: No 
evidence for bowel obstruction. Lymph nodes: No lymphadenopathy. A 1.1 cm short 
axis lymph node in the upper retroperitoneum adjacent to the pancreatic body, 
unchanged and likely reactiveVessels: Unremarkable.Abdominal wall: 
Unremarkable.Bones: Unremarkable. IMPRESSION: Cirrhosis with sequelae of portal 
hypertension including marked splenomegaly, moderate ascites, a few small 
varices as above, and no suspicious liver lesions. TIPS, suggestion of mild to 
moderate stenosis at the distal TIPS near the hepatic vein, otherwise patent. 
Cholelithiasis. No biliary ductal dilation or filling defects. The gallbladder 
is contracted. Gallbladder wall thickening, nonspecific in the setting of under
distention and chronic liver disease. Signed: Sarina Gamboa Verified 
Date/Time: 2021 18:32:22 Reading Location: [address] Body Reading 
Room Electronically signed by: SARINA GAMBOA MD on 2021 06:32 PM
VITAMIN B12 AND [id number] 18:17:00





             Test Item    Value        Reference Range Interpretation Comments

 

             VITAMIN B12 (BEAKER) (test code = 1215 pg/mL   213-816      H      

      



             774)                                                

 

             FOLATE (BEAKER) (test code = 362) 8.40 ng/mL   >=7.00              

      



 ID - RHONDA [id number] 18:02:00





             Test Item    Value        Reference Range Interpretation Comments

 

             T4 TOTAL (BEAKER) (test code = 895) 5.9 ug/dL    4.9-11.7          

        



 ID - RHONDA [id number] 18:02:00





             Test Item    Value        Reference Range Interpretation Comments

 

             THYROID STIMULATING HORMONE 1.687 uIU/mL 0.350-4.940               



             (BEAKER) (test code = 772)                                        



 ID - RHONDA MCALCIUM, [id number] 17:42:00





             Test Item    Value        Reference Range Interpretation Comments

 

             CALCIUM IONIZED (BEAKER) (test 1.08 mmol/L  1.12-1.27    L         

   



             code = 698)                                         

 

             PH, BLOOD (BEAKER) (test code = 7.45                               

    



             1810)                                               



[id number] 17:41:00





             Test Item    Value        Reference Range Interpretation Comments

 

             TRANSFERRIN (BEAKER) (test code = 106 mg/dL    174-382      L      

      



             541)                                                



 ID - RHONDA MSpecimen moderately ictericIRON, TIBC, % SAT. (WITHOUT 
FERRITIN)2021 17:41:00





             Test Item    Value        Reference Range Interpretation Comments

 

             IRON (BEAKER) (test code = 547) 126.0 ug/dL  40.0-160.0            

    

 

             TOTAL IRON BINDING CAPACITY 133 ug/dL    250-450      L            



             (BEAKER) (test code = 769)                                        

 

             IRON % SATURATION (2) (BEAKER) 95 %         20-55        H         

   



             (test code = 2590)                                        



 ID - RHONDA [id number] 17:39:00





             Test Item    Value        Reference Range Interpretation Comments

 

             ETHANOL (BEAKER) (test code = 400) < mg/dL      <=10               

       



 ID - RHONDA [id number] 17:32:00





             Test Item    Value        Reference Range Interpretation Comments

 

             FIBRINOGEN LEVEL (BEAKER) (test 154 mg/dl    225-434      L        

    



             code = 658)                                         



[id number] 17:26:00





             Test Item    Value        Reference Range Interpretation Comments

 

             PARTIAL THROMBOPLASTIN TIME 42.0 seconds 22.5-36.0    H            



             (BEAKER) (test code = 760)                                        



BLOOD GAS, [id number] 16:01:00





             Test Item    Value        Reference Range Interpretation Comments

 

             PH ARTERIAL (BEAKER) (test code = 7.51         7.35-7.45    H      

      



             383)                                                

 

             PCO2 ARTERIAL (BEAKER) (test code 32 mm Hg     35-45        L      

      



             = 384)                                              

 

             PO2 ARTERIAL (BEAKER) (test code = 74 mm Hg     80-90        L     

       



             385)                                                

 

             O2 SATURATION ARTERIAL (BEAKER) 96.2 %       96.0-97.0             

    



             (test code = 386)                                        

 

             HCO3 ARTERIAL (BEAKER) (test code 25 mmol/L    21-29               

      



             = 388)                                              

 

             BASE EXCESS ARTERIAL (BEAKER) 2.2 mmol/L   -2.0-3.0                

  



             (test code = 387)                                        

 

             PATIENT TEMPERATURE (BEAKER) (test 37.0                            

       



             code = 1818)                                        

 

             FIO2 (BEAKER) (test code = 1819) 21.0                              

     



RAD, MANDIBLE, MIN 4 [id number] 13:58:00Referring: Dr. Qiana Joshi for exam:-&gt;liver transplant evaluation
************************************************************[facility] : 1970  Sex: 
M************************************************************FINAL REPORT 
PATIENT ID: [id number] RAD, MANDIBLE, MIN 4 VIEWS INDICATION: liver transplant 
evaluationCOMPARISON: None TECHNIQUE: AP and lateral radiographs of the mandible
 FINDINGS/IMPRESSION:Multiple dental fillings. Periapical lucencies. Signed: 
Mela Cotto Verified Date/Time: 2021 13:58:03 Reading 
Location: [facility] Radiology Reading Room Electronically signed by: MELA COTTO MD on 2021 01:58 PMRAD, CHEST, 2 [id number] 
13:56:00Referring: Dr. Qiana Joshi for exam:-&gt;liver transplant 
evaluation************************************************************[facility] : 1970 Sex: 
M************************************************************FINAL REPORT 
PATIENT ID: [id number] INDICATION: liver transplant evaluation COMPARISON: None 
TECHNIQUE:Frontal and lateral views of the chest. FINDINGS: Lungs and pleura: 
Clear lungs. No effusion.Heart and mediastinum: Normal heart size. Unremarkable 
mediastinal contours.Osseous structures: No acute abnormality.Additional 
findings: None. IMPRESSION: No acute intrathoracic abnormality. Signed: 
Mela Cotto Verified Date/Time: 2021 13:56:08 Reading 
[facility] Electronically signed by: MELA COTTO MD on 2021 01:56 PMLIPID [id number] 13:19:00





             Test Item    Value        Reference Range Interpretation Comments

 

             TRIGLYCERIDES (BEAKER) (test code = 42 mg/dL                       

        



             540)                                                

 

             CHOLESTEROL (BEAKER) (test code = 40 mg/dL                         

      



             631)                                                

 

             HDL CHOLESTEROL (BEAKER) (test code 9 mg/dL                        

        



             = 976)                                              

 

             LDL CHOLESTEROL CALCULATED (BEAKER) 23 mg/dL                       

        



             (test code = 633)                                        



Triglyceride Reference Range: Low Risk &lt;150 Borderline 150-199 High Risk 200-
499 Very High Risk &gt;=500Cholesterol Reference Range: Low Risk &lt;200 
Borderline 200-239 High Risk &gt;240HDL Cholesterol Reference Range: Low Risk 
&gt;=60 High Risk &lt;40LDL Cholesterol Reference Range: Optimal &lt;100 Near 
Optimal 100-129 Borderline 130-159 High 160-189 Very High &gt;=190  ID -
 RHONDA MSpecimen moderately [id number] 13:09:00





             Test Item    Value        Reference Range Interpretation Comments

 

             MAGNESIUM (BEAKER) (test code = 1.9 mg/dL    1.6-2.6               

    



             627)                                                



 ID - RHONDA MURIC [id number] 13:09:00





             Test Item    Value        Reference Range Interpretation Comments

 

             URIC ACID (BEAKER) (test code = 4.0 mg/dL    2.6-7.2               

    



             773)                                                



 ID - RHONDA MSpecimen moderately [id number] 13:09:00





             Test Item    Value        Reference Range Interpretation Comments

 

             PHOSPHORUS (BEAKER) (test code = 3.4 mg/dL    2.3-4.7              

     



             604)                                                



 ID - RHONDA MGAMMA GLUTAMYL TRANSFERASE (GGT)2021 13:09:00





             Test Item    Value        Reference Range Interpretation Comments

 

             GAMMA GLUTAMYL TRANSFERASE (BEAKER) 65 U/L       9-64         H    

        



             (test code = 364)                                        



 ID - RHONDA MSpecimen moderately ictericU/S, [id number] 
09:40:00Referring: Dr. Qiana AikenenderSend ascitic fluid for cell count and 
differential If Cr &gt; 1.5, do not remove more than 3.5L of ascitic fluid. If 
&gt; 3L removed, please administer 200 mL of albumin 25% (50 grams) IV x 1Labs 
to be ordered:-&gt;Cell CountReason for Exam:-&gt;ascites, send fluid for cell 
count with differential
************************************************************[facility] : 1970 Sex: 
M************************************************************FINAL REPORT 
PATIENT ID: [id number] Ultrasound guided paracentesis. Clinical History: Ascites. 
Sedation: None. : Denisa Tellez PA-C Assistant: None. Estimated
 Blood Loss: &lt; 1 cc. Specimen: 2800 cc of clear yellow fluid, samples sent to
 laboratory. Technique: Informed consent was obtained. The risks of pain, 
bleeding, infection, bowel perforation, injury to adjacent structures, and adv
erse medication reactions were discussed with the patient. After informed 
consent was obtained, the patient's abdomen was scanned. The right lower 
quadrant of the abdomen was selected for paracentesis.After the largest fluid 
pocket area was marked, and the anterior abdominal wall was evaluated with color
 Doppler to exclude presence of blood vessels traversing the area, the skin was 
prepped and draped in the usual sterile manner. After local anesthesia was 
achieved with 2% lidocaine, a 5 [place]ian one-step catheter was advanced into the 
peritoneal cavity under ultrasound guidance. After completion of drainage, the 
catheter was removed. There was no evidence of complication. 
Impression:Successful ultrasound guided paracentesis. Signed: Dejah Fitzgerald Verified Date/Time: 2021 09:40:17 Reading Location: [facility] P006J 
Ultrasound Reading Room Electronically signed by: DEJAH FITZGERALD M.D. on 
2021 09:40 AMCOMPREHENSIVE METABOLIC [id number] 06:17:00





             Test Item    Value        Reference Range Interpretation Comments

 

             TOTAL PROTEIN 6.0 gm/dL    6.0-8.3                   



             (BEAKER) (test code =                                        



             770)                                                

 

             ALBUMIN (BEAKER) 1.8 g/dL     3.5-5.0      L            



             (test code = 1145)                                        

 

             ALKALINE PHOSPHATASE 86 U/L                           



             (BEAKER) (test code =                                        



             346)                                                

 

             BILIRUBIN TOTAL 7.2 mg/dL    0.2-1.2      H            



             (BEAKER) (test code =                                        



             377)                                                

 

             SODIUM (BEAKER) (test 130 meq/L    136-145      L            



             code = 381)                                         

 

             POTASSIUM (BEAKER) 3.7 meq/L    3.5-5.1                   



             (test code = 379)                                        

 

             CHLORIDE (BEAKER) 102 meq/L                        



             (test code = 382)                                        

 

             CO2 (BEAKER) (test 24 meq/L     22-29                     



             code = 355)                                         

 

             BLOOD UREA NITROGEN 6 mg/dL      7-21         L            



             (BEAKER) (test code =                                        



             354)                                                

 

             CREATININE (BEAKER) 0.76 mg/dL   0.57-1.25                 



             (test code = 358)                                        

 

             GLUCOSE RANDOM 90 mg/dL                         



             (BEAKER) (test code =                                        



             652)                                                

 

             CALCIUM (BEAKER) 7.3 mg/dL    8.4-10.2     L            



             (test code = 697)                                        

 

             AST (SGOT) (BEAKER) 100 U/L      5-34         H            



             (test code = 353)                                        

 

             ALT (SGPT) (BEAKER) 54 U/L       6-55                      



             (test code = 347)                                        

 

             EGFR (BEAKER) (test 108                                    ESTIMATE

D GFR IS



             code = 1092) mL/min/1.73 sq                           NOT AS ACCURA

TE AS



                          m                                      CREATININE



                                                                 CLEARANCE IN



                                                                 PREDICTING



                                                                 GLOMERULAR



                                                                 FILTRATION RATE

.



                                                                 ESTIMATED GFR I

S



                                                                 NOT APPLICABLE 

FOR



                                                                 DIALYSIS PATIEN

TS.



 ID - RHONDA MSpecimen moderately ictericCBC W/PLT COUNT &amp; AUTO 
[id number] 05:25:00





             Test Item    Value        Reference Range Interpretation Comments

 

             WHITE BLOOD CELL COUNT (BEAKER) 5.1 K/ L     3.5-10.5              

    



             (test code = 775)                                        

 

             RED BLOOD CELL COUNT (BEAKER) 2.98 M/ L    4.63-6.08    L          

  



             (test code = 761)                                        

 

             HEMOGLOBIN (BEAKER) (test code = 10.6 GM/DL   13.7-17.5    L       

     



             410)                                                

 

             HEMATOCRIT (BEAKER) (test code = 31.3 %       40.1-51.0    L       

     



             411)                                                

 

             MEAN CORPUSCULAR VOLUME (BEAKER) 105.0 fL     79.0-92.2    H       

     



             (test code = 753)                                        

 

             MEAN CORPUSCULAR HEMOGLOBIN 35.6 pg      25.7-32.2    H            



             (BEAKER) (test code = 751)                                        

 

             MEAN CORPUSCULAR HEMOGLOBIN CONC 33.9 GM/DL   32.3-36.5            

     



             (BEAKER) (test code = 752)                                        

 

             RED CELL DISTRIBUTION WIDTH 14.5 %       11.6-14.4    H            



             (BEAKER) (test code = 412)                                        

 

             PLATELET COUNT (BEAKER) (test 114 K/CU MM  150-450      L          

  



             code = 756)                                         

 

             MEAN PLATELET VOLUME (BEAKER) 11.9 fL      9.4-12.4                

  



             (test code = 754)                                        

 

             NUCLEATED RED BLOOD CELLS 0 /100 WBC   0-0                       



             (BEAKER) (test code = 413)                                        

 

             NEUTROPHILS RELATIVE PERCENT 55 %                                  

 



             (BEAKER) (test code = 429)                                        

 

             LYMPHOCYTES RELATIVE PERCENT 20 %                                  

 



             (BEAKER) (test code = 430)                                        

 

             MONOCYTES RELATIVE PERCENT 16 %                                   



             (BEAKER) (test code = 431)                                        

 

             EOSINOPHILS RELATIVE PERCENT 8 %                                   

 



             (BEAKER) (test code = 432)                                        

 

             BASOPHILS RELATIVE PERCENT 1 %                                    



             (BEAKER) (test code = 437)                                        

 

             NEUTROPHILS ABSOLUTE COUNT 2.80 K/ L    1.78-5.38                 



             (BEAKER) (test code = 670)                                        

 

             LYMPHOCYTES ABSOLUTE COUNT 1.00 K/ L    1.32-3.57    L            



             (BEAKER) (test code = 414)                                        

 

             MONOCYTES ABSOLUTE COUNT (BEAKER) 0.80 K/ L    0.30-0.82           

      



             (test code = 415)                                        

 

             EOSINOPHILS ABSOLUTE COUNT 0.40 K/ L    0.04-0.54                 



             (BEAKER) (test code = 416)                                        

 

             BASOPHILS ABSOLUTE COUNT (BEAKER) 0.07 K/ L    0.01-0.08           

      



             (test code = 417)                                        

 

             IMMATURE GRANULOCYTES-RELATIVE 1 %          0-1                    

   



             PERCENT (BEAKER) (test code =                                      

  



             2801)                                               



PROTHROMBIN [id number] 05:08:00





             Test Item    Value        Reference Range Interpretation Comments

 

             PROTIME (MONALISA) (test code = 25.0 seconds 11.9-14.2    H          

  



             759)                                                

 

             INR (MONALISA) (test code = 370) 2.33         <=5.90                 

   



Effective 2019: PT Reference Range ChangeNew: 11.9-14.2 Previous: 11.7-
14.7RECOMMENDED COUMADIN/WARFARIN INR THERAPY RANGESSTANDARD DOSE: 2.0-3.0 
Includes: PROPHYLAXIS for venous thrombosis, systemic embolization; TREATMENT 
for venous thrombosis and/or pulmonary embolus.HIGH RISK: Target INR is 2.5-3.5 
for patients wiht mechanical heart valves.U/S, ABDOMINAL, WITH [id number]
 03:09:00Referring: Dr. Qiana Evans limited area? Add comment if 
clarification is needed.-&gt;LiverReason for exam:-&gt;Cirrhosis, Assess TIPS
************************************************************[facility] : 1970 Sex: 
M************************************************************FINAL REPORT 
PATIENT ID: [id number] U/S, ABDOMINAL, WITH DOPPLER CLINICAL INDICATION: 
Cirrhosis, AssessTIPS COMPARISON: None TECHNIQUE: Abdominal ultrasound was 
performed with Doppler. FINDINGS:Liver: 13.7 cm in length at the right 
midclavicular line. Cirrhotic morphology. No lesion is identified by ultrasound.
 Main portal vein is patent measuring 0.8 cm in diameter and demonstrates 
hepatopetal flow. ATIPS stent is present. Biliary tree: Common duct 3 mm. No 
intrahepatic biliary ductal dilatation. Gallbladder: No shadowing 
calculus/calculi. No wall thickening. No pericholecystic fluid. Negative sonog
raphic Talavera's sign. Pancreas: Largely obscured by bowel gas but the visualized
 portions of the head are grossly unremarkable. Spleen: 14.2 cm in length. 
Ascites: Small perihepatic ascites. Kidneys: Right kidney measures 11 x 6.2 x 
5.4 cm with cortical thickness of 2 cm. Left kidney measures 11.3 x 6.8 x 5.5 cm
 with cortical thickness of 1.8 cm. Normal cortical echogenicity. No mass. No 
shadowing calculus. No hydronephrosis. IVC/Aorta: Segments partially seen. 
Unremarkable. Color and spectral Doppler evaluation of the hepatic vasculature: 
The main portal vein and branches are patent and demonstrate hepatofugal flow. 
The peak systolic velocity in the main portal vein is 19.4 cm/s. The proper, 
right and left hepatic arteries are patent with normal waveforms. The resistive 
indices in the proper, right and left hepatic arteries are 0.7, 0.7 and 0.7 
respectively. The hepatic confluence, main hepaticvein and branches are patent 
with normal waveforms. The splenic vein at the pancreatic head and tailwere not 
seen due to obscuration by bowel gas. The splenic artery and vein at the splenic
 hilum are patent with normal waveforms. The peak systolic velocities in the 
proximal, mid and distal TIPS stentof 30.4 cm/s, 53.9 cm/s and 98.5 cm/s 
respectively. IMPRESSION:Decreased velocities in the proximal and mid TIPS stent
 as well as in the main portal vein. 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 
Venography may be performed for further evaluation as clinically warranted. 
Hepatofugal flow in the mainportal vein in keeping with portal hypertension. 
Cirrhotic liver. Small perihepatic ascites. Splenomegaly. Signed: Marisabel Gandara 
MDReport Verified Date/Time: 2021 03:09:45 Electronically signed by: 
MARISABEL GANDARA MD on 2021 03:09 AMBODY FLUID CELL COUNT WITH 
[id number] 13:51:00





             Test Item    Value        Reference Range Interpretation Comments

 

             APPEARANCE FLUID (BEAKER) (test Hazy         Clear        A        

    



             code = 510)                                         

 

             COLOR FLUID (BEAKER) (test code Yellow       Colorless, Straw A    

        



             = 511)                                              

 

             RBC FLUID (BEAKER) (test code = 620 /cu mm   <=1          H        

    



             513)                                                

 

             ADJUSTED WBC FLUID (BEAKER) 279 /cu mm   <=5          H            



             (test code = 1691)                                        

 

             LINING CELLS (BEAKER) (test 34 /cu mm    <=1          H            



             code = 1590)                                        

 

             NEUTROPHILS FLUID (BEAKER) 12 %                                   



             (test code = 1656)                                        

 

             LYMPHS FLUID (BEAKER) (test 33 %                                   



             code = 488)                                         

 

             MONO/MACROPHAGE FLUID (BEAKER) 55 %                                

   



             (test code = 489)                                        

 

             EOSINOPHILS FLUID (BEAKER) 0 %                                    



             (test code = 491)                                        

 

             BASO FLUID (BEAKER) (test code 0 %                                 

   



             = 492)                                              

 

             CONTAINER BODY FLUID (BEAKER) Sterile Vial                         

  



             (test code = 2873)                                        



ALPHA FETOPROTEIN (AFP), TUMOR [id number] 10:37:00





             Test Item    Value        Reference Range Interpretation Comments

 

             ALPHA-FETOPROTEIN (BEAKER) (test 4.2 ng/mL    <10.0                

     



             code = 1094)                                        



 ID Dre TOPETE FHEPATIC FUNCTION [id number] 10:28:00





             Test Item    Value        Reference Range Interpretation Comments

 

             TOTAL PROTEIN (BEAKER) (test code = 7.5 gm/dL    6.0-8.3           

        



             770)                                                

 

             ALBUMIN (BEAKER) (test code = 1145) 2.3 g/dL     3.5-5.0      L    

        

 

             BILIRUBIN TOTAL (BEAKER) (test code 9.1 mg/dL    0.2-1.2      H    

        



             = 377)                                              

 

             BILIRUBIN DIRECT (BEAKER) (test 4.2 mg/dL    0.1-0.5      H        

    



             code = 706)                                         

 

             ALKALINE PHOSPHATASE (BEAKER) (test 115 U/L                  

        



             code = 346)                                         

 

             AST (SGOT) (BEAKER) (test code = 141 U/L      5-34         H       

     



             353)                                                

 

             ALT (SGPT) (BEAKER) (test code = 71 U/L       6-55         H       

     



             347)                                                



 ID Dre EFREM FSpecimen moderately ictericBASIC METABOLIC PANEL
2021 10:28:00





             Test Item    Value        Reference Range Interpretation Comments

 

             SODIUM (BEAKER) 130 meq/L    136-145      L            



             (test code = 381)                                        

 

             POTASSIUM (BEAKER) 4.1 meq/L    3.5-5.1                   



             (test code = 379)                                        

 

             CHLORIDE (BEAKER) 100 meq/L                        



             (test code = 382)                                        

 

             CO2 (BEAKER) (test 26 meq/L     22-29                     



             code = 355)                                         

 

             BLOOD UREA NITROGEN 7 mg/dL      7-21                      



             (BEAKER) (test code                                        



             = 354)                                              

 

             CREATININE (BEAKER) 0.83 mg/dL   0.57-1.25                 



             (test code = 358)                                        

 

             GLUCOSE RANDOM 101 mg/dL                        



             (BEAKER) (test code                                        



             = 652)                                              

 

             CALCIUM (BEAKER) 7.7 mg/dL    8.4-10.2     L            



             (test code = 697)                                        

 

             EGFR (BEAKER) (test 98 mL/min/1.73                           ESTIMA

SUSI GFR IS



             code = 1092) sq m                                   NOT AS ACCURATE

 AS



                                                                 CREATININE



                                                                 CLEARANCE IN



                                                                 PREDICTING



                                                                 GLOMERULAR



                                                                 FILTRATION RATE

.



                                                                 ESTIMATED GFR I

S



                                                                 NOT APPLICABLE 

FOR



                                                                 DIALYSIS PATIEN

TS.



 ID - EFREM FSpecimen moderately ictericPROTHROMBIN TIME/INR
2021 09:50:00





             Test Item    Value        Reference Range Interpretation Comments

 

             PROTIME (BEAKER) (test code = 22.7 seconds 11.9-14.2    H          

  



             759)                                                

 

             INR (BEAKER) (test code = 370) 2.08         <=5.90                 

   



Effective 2019: PT Reference Range ChangeNew: 11.9-14.2 Previous: 11.7-
14.7RECOMMENDED COUMADIN/WARFARIN INR THERAPY RANGESSTANDARD DOSE: 2.0-3.0 
Includes: PROPHYLAXIS for venous thrombosis, systemic embolization; TREATMENT 
for venous thrombosis and/or pulmonary embolus.HIGH RISK: Target INR is 2.5-3.5 
for patients wiht mechanical heart valves.CBC W/PLT COUNT &amp; AUTO 
[id number] 09:30:00





             Test Item    Value        Reference Range Interpretation Comments

 

             WHITE BLOOD CELL COUNT (BEAKER) 6.0 K/ L     3.5-10.5              

    



             (test code = 775)                                        

 

             RED BLOOD CELL COUNT (BEAKER) 3.63 M/ L    4.63-6.08    L          

  



             (test code = 761)                                        

 

             HEMOGLOBIN (BEAKER) (test code = 13.2 GM/DL   13.7-17.5    L       

     



             410)                                                

 

             HEMATOCRIT (BEAKER) (test code = 38.8 %       40.1-51.0    L       

     



             411)                                                

 

             MEAN CORPUSCULAR VOLUME (BEAKER) 106.9 fL     79.0-92.2    H       

     



             (test code = 753)                                        

 

             MEAN CORPUSCULAR HEMOGLOBIN 36.4 pg      25.7-32.2    H            



             (BEAKER) (test code = 751)                                        

 

             MEAN CORPUSCULAR HEMOGLOBIN CONC 34.0 GM/DL   32.3-36.5            

     



             (BEAKER) (test code = 752)                                        

 

             RED CELL DISTRIBUTION WIDTH 14.6 %       11.6-14.4    H            



             (BEAKER) (test code = 412)                                        

 

             PLATELET COUNT (BEAKER) (test 141 K/CU MM  150-450      L          

  



             code = 756)                                         

 

             MEAN PLATELET VOLUME (BEAKER) 11.4 fL      9.4-12.4                

  



             (test code = 754)                                        

 

             NUCLEATED RED BLOOD CELLS 0 /100 WBC   0-0                       



             (BEAKER) (test code = 413)                                        

 

             NEUTROPHILS RELATIVE PERCENT 53 %                                  

 



             (BEAKER) (test code = 429)                                        

 

             LYMPHOCYTES RELATIVE PERCENT 23 %                                  

 



             (BEAKER) (test code = 430)                                        

 

             MONOCYTES RELATIVE PERCENT 15 %                                   



             (BEAKER) (test code = 431)                                        

 

             EOSINOPHILS RELATIVE PERCENT 7 %                                   

 



             (BEAKER) (test code = 432)                                        

 

             BASOPHILS RELATIVE PERCENT 1 %                                    



             (BEAKER) (test code = 437)                                        

 

             NEUTROPHILS ABSOLUTE COUNT 3.20 K/ L    1.78-5.38                 



             (BEAKER) (test code = 670)                                        

 

             LYMPHOCYTES ABSOLUTE COUNT 1.36 K/ L    1.32-3.57                 



             (BEAKER) (test code = 414)                                        

 

             MONOCYTES ABSOLUTE COUNT (BEAKER) 0.93 K/ L    0.30-0.82    H      

      



             (test code = 415)                                        

 

             EOSINOPHILS ABSOLUTE COUNT 0.40 K/ L    0.04-0.54                 



             (BEAKER) (test code = 416)                                        

 

             BASOPHILS ABSOLUTE COUNT (BEAKER) 0.08 K/ L    0.01-0.08           

      



             (test code = 417)                                        

 

             IMMATURE GRANULOCYTES-RELATIVE 1 %          0-1                    

   



             PERCENT (BEAKER) (test code =                                      

  



             2801)                                               



BLOOD [id number] 17:00:00





             Test Item    Value        Reference Range Interpretation Comments

 

             CULTURE (BEAKER) (test No growth in 5 days                         

  



             code = 1095)                                        



BLOOD [id number] 17:00:00





             Test Item    Value        Reference Range Interpretation Comments

 

             CULTURE (BEAKER) (test No growth in 5 days                         

  



             code = 1095)                                        



COMPREHENSIVE METABOLIC [id number] 07:39:00





             Test Item    Value        Reference Range Interpretation Comments

 

             TOTAL PROTEIN 6.6 gm/dL    6.0-8.3                   Specimen sligh

tly



             (BEAKER) (test code =                                        hemoly

zed



             770)                                                

 

             ALBUMIN (BEAKER) 2.7 g/dL     3.5-5.0      L            Specimen sl

ightly



             (test code = 1145)                                        hemolyzed

 

             ALKALINE PHOSPHATASE 78 U/L                           



             (BEAKER) (test code =                                        



             346)                                                

 

             BILIRUBIN TOTAL 2.6 mg/dL    0.2-1.2      H            Specimen sli

ghtly



             (BEAKER) (test code =                                        hemoly

zed



             377)                                                

 

             SODIUM (BEAKER) (test 134 meq/L    136-145      L            



             code = 381)                                         

 

             POTASSIUM (BEAKER) 4.0 meq/L    3.5-5.1                   Specimen 

slightly



             (test code = 379)                                        hemolyzed

 

             CHLORIDE (BEAKER) 107 meq/L                        



             (test code = 382)                                        

 

             CO2 (BEAKER) (test 21 meq/L     22-29        L            



             code = 355)                                         

 

             BLOOD UREA NITROGEN 9 mg/dL      7-21                      



             (BEAKER) (test code =                                        



             354)                                                

 

             CREATININE (BEAKER) 0.69 mg/dL   0.57-1.25                 Specimen

 slightly



             (test code = 358)                                        hemolyzed

 

             GLUCOSE RANDOM 126 mg/dL           H            



             (BEAKER) (test code =                                        



             652)                                                

 

             CALCIUM (BEAKER) 7.8 mg/dL    8.4-10.2     L            



             (test code = 697)                                        

 

             AST (SGOT) (BEAKER) 52 U/L       5-34         H            Specimen

 slightly



             (test code = 353)                                        hemolyzed

 

             ALT (SGPT) (BEAKER) 24 U/L       6-55                      Specimen

 slightly



             (test code = 347)                                        hemolyzed

 

             EGFR (BEAKER) (test 123                                    ESTIMATE

D GFR IS



             code = 1092) mL/min/1.73 sq                           NOT AS ACCURA

TE AS



                          m                                      CREATININE



                                                                 CLEARANCE IN



                                                                 PREDICTING



                                                                 GLOMERULAR



                                                                 FILTRATION RATE

.



                                                                 ESTIMATED GFR I

S



                                                                 NOT APPLICABLE 

FOR



                                                                 DIALYSIS PATIEN

TS.



Specimen slightly ictericHEMOGLOBIN AND [id number] 06:16:00





             Test Item    Value        Reference Range Interpretation Comments

 

             HEMOGLOBIN (BEAKER) (test code = 8.8 GM/DL    13.7-17.5    L       

     



             410)                                                

 

             HEMATOCRIT (BEAKER) (test code = 28.4 %       40.1-51.0    L       

     



             411)                                                



HEMOGLOBIN AND [id number] 23:04:00





             Test Item    Value        Reference Range Interpretation Comments

 

             HEMOGLOBIN (BEAKER) (test code = 9.6 GM/DL    13.7-17.5    L       

     



             410)                                                

 

             HEMATOCRIT (BEAKER) (test code = 31.6 %       40.1-51.0    L       

     



             411)                                                



ANG, [id number] 17:11:00Referring: Dr. Qiana Joshi for exam:-
&gt;Recurrent GI bleedFINAL REPORT PATIENT ID: [id number] TIPS procedure, 
2017 HISTORY: Variceal bleeding Anesthesia: General Approach: Right 
internal jugular vein Modality: Ultrasound and fluoroscopy, fluoroscopy time:18 
minutes, total dose: 6-0 mGy, reference air kerma method TECHNIQUE: This 
procedure was performed after obtaining written informed consent using all 
elements maximal sterile barrier technique. Ultrasound was used to identify the 
right internal jugular vein was used for access the vein is well. No images were
 saved on PACS. Catheter was advanced in the right hepatic vein and contrast was
 injected confirming patency and appropriate positioning. A coaxial needle 
system was then advanced into the right hepatic vein and directed through the 
liver inferiorly. A vessel was encountered and the sheath partially advanced 
into the vascular system. The vessel entered was a prominent right hepatic 
artery. The sheath was removed from the right hepatic artery and the tract 
between the hepatic artery and the right hepatic vein was embolized with a 
stainless steel coil. A second puncture was then made more medially, entering 
the right portal vein. A guidewire was introduced into the portal vein and 
subsequently in the splenic vein. The tract was initially dilated with a 6 mm 
balloon catheter. An 8 cm in length by 10 mm in diameter Viatorr covered stent 
was deployed and subsequently dilated to 8 mm. Post TIPS placement angiography 
discloses satisfactory appearance with a pet portosystemic gradient of 9 mm. P
ortal vein pressure was 25 mm in the IVC pressure was 16 mm. The catheter was 
removed and neck and hemostasis obtained. CONCLUSION: TIPS placement. Signed: 
Rupa Blanco MDReport Verified Date/Time: 2017 17:11:07 Reading Location:
 [place] [id number] Angio Body Reading Room Electronically signed by: RUPA BLANCO M.D. on 2017 05:11 PMURINE [id number] 14:13:00





             Test Item    Value        Reference Range Interpretation Comments

 

             CULTURE (DARBYNATASHA) (test code = 1095) No growth                      

        



CBC W/PLT COUNT &amp; AUTO [id number] 13:29:00





             Test Item    Value        Reference Range Interpretation Comments

 

             WHITE BLOOD CELL COUNT 4.3 K/ L     3.5-10.5                  



             (BEAKER) (test code =                                        



             775)                                                

 

             RED BLOOD CELL COUNT 3.19 M/ L    4.63-6.08    L            



             (BEAKER) (test code =                                        



             761)                                                

 

             HEMOGLOBIN (BEAKER) 7.7 GM/DL    13.7-17.5    L            



             (test code = 410)                                        

 

             HEMATOCRIT (BEAKER) 25.0 %       40.1-51.0    L            



             (test code = 411)                                        

 

             MEAN CORPUSCULAR VOLUME 78.7 fL      79.0-92.2    L            



             (BEAKER) (test code =                                        



             753)                                                

 

             MEAN CORPUSCULAR 24.1 pg      25.7-32.2    L            



             HEMOGLOBIN (BEAKER)                                        



             (test code = 751)                                        

 

             MEAN CORPUSCULAR 30.7 GM/DL   32.3-36.5    L            



             HEMOGLOBIN CONC                                        



             (BEAKER) (test code =                                        



             752)                                                

 

             RED CELL DISTRIBUTION 18.2 %       11.6-14.4    H            



             WIDTH (BEAKER) (test                                        



             code = 412)                                         

 

             PLATELET COUNT (BEAKER) 54 K/CU MM   150-450      L            



             (test code = 756)                                        

 

             MEAN PLATELET VOLUME  fL          9.4-12.4                  Unable 

to report due



             (BEAKER) (test code =                                        to abn

ormal Platelet



             754)                                                population



                                                                 distribution.

 

             NUCLEATED RED BLOOD 0 /100 WBC   0-0                       



             CELLS (BEAKER) (test                                        



             code = 413)                                         

 

             NEUTROPHILS RELATIVE 56 %                                   



             PERCENT (BEAKER) (test                                        



             code = 429)                                         

 

             LYMPHOCYTES RELATIVE 22 %                                   



             PERCENT (BEAKER) (test                                        



             code = 430)                                         

 

             MONOCYTES RELATIVE 11 %                                   



             PERCENT (BEAKER) (test                                        



             code = 431)                                         

 

             EOSINOPHILS RELATIVE 10 %                                   



             PERCENT (BEAKER) (test                                        



             code = 432)                                         

 

             BASOPHILS RELATIVE 1 %                                    



             PERCENT (BEAKER) (test                                        



             code = 437)                                         

 

             NEUTROPHILS ABSOLUTE 2.41 K/ L    1.78-5.38                 



             COUNT (BEAKER) (test                                        



             code = 670)                                         

 

             LYMPHOCYTES ABSOLUTE 0.96 K/ L    1.32-3.57    L            



             COUNT (BEAKER) (test                                        



             code = 414)                                         

 

             MONOCYTES ABSOLUTE 0.47 K/ L    0.30-0.82                 



             COUNT (BEAKER) (test                                        



             code = 415)                                         

 

             EOSINOPHILS ABSOLUTE 0.41 K/ L    0.04-0.54                 



             COUNT (BEAKER) (test                                        



             code = 416)                                         

 

             BASOPHILS ABSOLUTE 0.02 K/ L    0.01-0.08                 



             COUNT (BEAKER) (test                                        



             code = 417)                                         

 

             IMMATURE     0 %          0-1                       



             GRANULOCYTES-RELATIVE                                        



             PERCENT (BEAKER) (test                                        



             code = 2804)                                        



(MANUAL DIFFERENTIAL)2017 13:29:00





             Test Item    Value        Reference Range Interpretation Comments

 

             TOTAL COUNTED (BEAKER) (test code =                                

        



             1351)                                               

 

             WBC MORPHOLOGY (BEAKER) (test code = Normal                        

         



             487)                                                

 

             PLT MORPHOLOGY (BEAKER) (test code = Normal                        

         



             486)                                                

 

             RBC MORPHOLOGY (BEAKER) (test code = Normal                        

         



             762)                                                



PROTHROMBIN [id number] 09:45:00





             Test Item    Value        Reference Range Interpretation Comments

 

             PROTIME (BEAKER) (test code = 17.3 seconds 11.7-14.7    H          

  



             759)                                                

 

             INR (BEAKER) (test code = 370) 1.4          <=5.9                  

   



RECOMMENDED COUMADIN/WARFARIN INR THERAPY RANGESSTANDARD DOSE: 2.0 - 3.0 
Includes: PROPHYLAXIS for venous thrombosis, systemic embolization; TREATMENT 
for venous thrombosis and/or pulmonary embolus.HIGH RISK: Target INR is 2.5-3.5 
for patients with mechanical heart valves.COMPREHENSIVE METABOLIC PANEL
2017 07:07:00





             Test Item    Value        Reference Range Interpretation Comments

 

             TOTAL PROTEIN 6.6 gm/dL    6.0-8.3                   



             (BEAKER) (test code =                                        



             770)                                                

 

             ALBUMIN (BEAKER) 2.8 g/dL     3.5-5.0      L            



             (test code = 1145)                                        

 

             ALKALINE PHOSPHATASE 77 U/L                           



             (BEAKER) (test code =                                        



             346)                                                

 

             BILIRUBIN TOTAL 2.6 mg/dL    0.2-1.2      H            



             (BEAKER) (test code =                                        



             377)                                                

 

             SODIUM (BEAKER) (test 136 meq/L    136-145                   



             code = 381)                                         

 

             POTASSIUM (BEAKER) 3.6 meq/L    3.5-5.1                   



             (test code = 379)                                        

 

             CHLORIDE (BEAKER) 106 meq/L                        



             (test code = 382)                                        

 

             CO2 (BEAKER) (test 23 meq/L     22-29                     



             code = 355)                                         

 

             BLOOD UREA NITROGEN 11 mg/dL     7-21                      



             (BEAKER) (test code =                                        



             354)                                                

 

             CREATININE (BEAKER) 0.82 mg/dL   0.57-1.25                 



             (test code = 358)                                        

 

             GLUCOSE RANDOM 107 mg/dL           H            



             (BEAKER) (test code =                                        



             652)                                                

 

             CALCIUM (BEAKER) 7.9 mg/dL    8.4-10.2     L            



             (test code = 697)                                        

 

             AST (SGOT) (BEAKER) 34 U/L       5-34                      



             (test code = 353)                                        

 

             ALT (SGPT) (BEAKER) 14 U/L       6-55                      



             (test code = 347)                                        

 

             EGFR (BEAKER) (test 101                                    ESTIMATE

D GFR IS



             code = 1092) mL/min/1.73 sq                           NOT AS ACCURA

TE AS



                          m                                      CREATININE



                                                                 CLEARANCE IN



                                                                 PREDICTING



                                                                 GLOMERULAR



                                                                 FILTRATION RATE

.



                                                                 ESTIMATED GFR I

S



                                                                 NOT APPLICABLE 

FOR



                                                                 DIALYSIS PATIEN

TS.



Specimen slightly [id number] 06:58:00





             Test Item    Value        Reference Range Interpretation Comments

 

             PHOSPHORUS (BEAKER) (test code = 3.1 mg/dL    2.3-4.7              

     



             604)                                                



[id number] 06:58:00





             Test Item    Value        Reference Range Interpretation Comments

 

             MAGNESIUM (BEAKER) (test code = 1.8 mg/dL    1.6-2.6               

    



             627)                                                



HEMOGLOBIN AND [id number] 22:27:00





             Test Item    Value        Reference Range Interpretation Comments

 

             HEMOGLOBIN (BEAKER) (test code = 7.3 GM/DL    13.7-17.5    L       

     



             410)                                                

 

             HEMATOCRIT (BEAKER) (test code = 23.9 %       40.1-51.0    L       

     



             411)                                                



HEMOGLOBIN AND [id number] 13:19:00





             Test Item    Value        Reference Range Interpretation Comments

 

             HEMOGLOBIN (BEAKER) (test code = 7.5 GM/DL    13.7-17.5    L       

     



             410)                                                

 

             HEMATOCRIT (BEAKER) (test code = 24.8 %       40.1-51.0    L       

     



             411)                                                



POCT-GLUCOSE [id number] 12:25:00





             Test Item    Value        Reference Range Interpretation Comments

 

             POC-GLUCOSE METER 128 mg/dL           H            TESTED AT 

[facility] [address]



             (BEAKER) (test code =                                        [address] [place]



             1538)                                               [address]



MR, ABDOMEN, [id number] 11:54:00Referring: Dr. Qiana Pollack PROTOCOL
FINAL REPORT PATIENT ID: [id number] INDICATION:47-year-old male with cirrhosis. 
Surveillance for hepatocellular carcinoma. COMPARISON: Abdomen pelvis CT exam 
2016 TECHNIQUE: MR of the AbdomenWITHOUT and WITH intravenous 
contrast. FINDINGS: Irregular contour of the liver is in keeping with cirrhosis.
 No significant lobar hypertrophy or atrophy is demonstrated. Diffuse mild 
heterogeneous signal of the liver parenchyma is noted. No suspicious enhancing 
liver mass is demonstrated. There is nothrombosis in the portal vein, superior 
mesenteric vein, or splenic vein. Main portal vein is mildlydilated measuring 
1.3 cm in diameter. Spleen is mildly enlarged measuring 14 cm in sagittal 
diameter. There is no ascites or upper abdominal lymphadenopathy. No significant
 varices are demonstrated. Gallbladder is notable for a few small stones. There 
is no biliary ductal dilatation. Pancreas, adrenalglands, kidneys, and 
visualized bowel loops are unremarkable. Partial imaging of the lower thorax is
unremarkable. No suspicious marrow signal abnormality demonstrated. 
IMPRESSION:Cirrhosis without evidence of hepatocellular carcinoma. No portal 
vein, superior mesenteric vein, or splenic vein thrombosis. Mildly dilated main 
portal vein and mild prostatomegaly. No ascites. Cholelithiasis. Signed: Rommel Mendoza MDReport Verified Date/Time: 2017 11:54:46 Reading Location: 
[address] Reading Room Electronically signed by: ROMMEL YAP M.D. on 2017 11:54 [id number] 06:12:00





             Test Item    Value        Reference Range Interpretation Comments

 

             PHOSPHORUS (BEAKER) (test code = 2.0 mg/dL    2.3-4.7      L       

     



             604)                                                



[id number] 06:12:00





             Test Item    Value        Reference Range Interpretation Comments

 

             MAGNESIUM (BEAKER) (test code = 1.7 mg/dL    1.6-2.6               

    



             627)                                                



COMPREHENSIVE METABOLIC [id number] 06:12:00





             Test Item    Value        Reference Range Interpretation Comments

 

             TOTAL PROTEIN 6.5 gm/dL    6.0-8.3                   



             (BEAKER) (test code =                                        



             770)                                                

 

             ALBUMIN (BEAKER) 2.8 g/dL     3.5-5.0      L            



             (test code = 1145)                                        

 

             ALKALINE PHOSPHATASE 79 U/L                           



             (BEAKER) (test code =                                        



             346)                                                

 

             BILIRUBIN TOTAL 3.0 mg/dL    0.2-1.2      H            



             (BEAKER) (test code =                                        



             377)                                                

 

             SODIUM (BEAKER) (test 139 meq/L    136-145                   



             code = 381)                                         

 

             POTASSIUM (BEAKER) 3.7 meq/L    3.5-5.1                   



             (test code = 379)                                        

 

             CHLORIDE (BEAKER) 109 meq/L           H            



             (test code = 382)                                        

 

             CO2 (BEAKER) (test 26 meq/L     22-29                     



             code = 355)                                         

 

             BLOOD UREA NITROGEN 19 mg/dL     7-21                      



             (BEAKER) (test code =                                        



             354)                                                

 

             CREATININE (BEAKER) 0.88 mg/dL   0.57-1.25                 



             (test code = 358)                                        

 

             GLUCOSE RANDOM 113 mg/dL           H            



             (BEAKER) (test code =                                        



             652)                                                

 

             CALCIUM (BEAKER) 8.0 mg/dL    8.4-10.2     L            



             (test code = 697)                                        

 

             AST (SGOT) (BEAKER) 36 U/L       5-34         H            



             (test code = 353)                                        

 

             ALT (SGPT) (BEAKER) 16 U/L       6-55                      



             (test code = 347)                                        

 

             EGFR (BEAKER) (test 93 mL/min/1.73                           ESTIMA

SUSI GFR IS



             code = 1092) sq m                                   NOT AS ACCURATE

 AS



                                                                 CREATININE



                                                                 CLEARANCE IN



                                                                 PREDICTING



                                                                 GLOMERULAR



                                                                 FILTRATION RATE

.



                                                                 ESTIMATED GFR I

S



                                                                 NOT APPLICABLE 

FOR



                                                                 DIALYSIS PATIEN

TS.



Specimen slightly ictericCBC W/PLT COUNT &amp; AUTO [id number] 
05:43:00





             Test Item    Value        Reference Range Interpretation Comments

 

             WHITE BLOOD CELL COUNT 3.8 K/ L     3.5-10.5                  



             (BEAKER) (test code =                                        



             775)                                                

 

             RED BLOOD CELL COUNT 3.19 M/ L    4.63-6.08    L            



             (BEAKER) (test code =                                        



             761)                                                

 

             HEMOGLOBIN (BEAKER) 7.6 GM/DL    13.7-17.5    L            



             (test code = 410)                                        

 

             HEMATOCRIT (BEAKER) 24.9 %       40.1-51.0    L            



             (test code = 411)                                        

 

             MEAN CORPUSCULAR VOLUME 78.1 fL      79.0-92.2    L            



             (BEAKER) (test code =                                        



             753)                                                

 

             MEAN CORPUSCULAR 23.8 pg      25.7-32.2    L            



             HEMOGLOBIN (BEAKER)                                        



             (test code = 751)                                        

 

             MEAN CORPUSCULAR 30.5 GM/DL   32.3-36.5    L            



             HEMOGLOBIN CONC                                        



             (BEAKER) (test code =                                        



             752)                                                

 

             RED CELL DISTRIBUTION 17.7 %       11.6-14.4    H            



             WIDTH (BEAKER) (test                                        



             code = 412)                                         

 

             PLATELET COUNT (BEAKER) 61 K/CU MM   150-450      L            



             (test code = 756)                                        

 

             MEAN PLATELET VOLUME  fL          9.4-12.4                  Unable 

to report due



             (BEAKER) (test code =                                        to abn

ormal Platelet



             754)                                                population



                                                                 distribution.

 

             NUCLEATED RED BLOOD 0 /100 WBC   0-0                       



             CELLS (BEAKER) (test                                        



             code = 413)                                         

 

             NEUTROPHILS RELATIVE 59 %                                   



             PERCENT (BEAKER) (test                                        



             code = 429)                                         

 

             LYMPHOCYTES RELATIVE 22 %                                   



             PERCENT (BEAKER) (test                                        



             code = 430)                                         

 

             MONOCYTES RELATIVE 11 %                                   



             PERCENT (BEAKER) (test                                        



             code = 431)                                         

 

             EOSINOPHILS RELATIVE 7 %                                    



             PERCENT (BEAKER) (test                                        



             code = 432)                                         

 

             BASOPHILS RELATIVE 1 %                                    



             PERCENT (BEAKER) (test                                        



             code = 437)                                         

 

             NEUTROPHILS ABSOLUTE 2.26 K/ L    1.78-5.38                 



             COUNT (BEAKER) (test                                        



             code = 670)                                         

 

             LYMPHOCYTES ABSOLUTE 0.83 K/ L    1.32-3.57    L            



             COUNT (BEAKER) (test                                        



             code = 414)                                         

 

             MONOCYTES ABSOLUTE 0.42 K/ L    0.30-0.82                 



             COUNT (BEAKER) (test                                        



             code = 415)                                         

 

             EOSINOPHILS ABSOLUTE 0.27 K/ L    0.04-0.54                 



             COUNT (BEAKER) (test                                        



             code = 416)                                         

 

             BASOPHILS ABSOLUTE 0.03 K/ L    0.01-0.08                 



             COUNT (BEAKER) (test                                        



             code = 417)                                         

 

             IMMATURE     1 %          0-1                       



             GRANULOCYTES-RELATIVE                                        



             PERCENT (BEAKER) (test                                        



             code = 2801)                                        



HEMOGLOBIN AND [id number] 05:40:00





             Test Item    Value        Reference Range Interpretation Comments

 

             HEMOGLOBIN (BEAKER) (test code = 7.6 GM/DL    13.7-17.5    L       

     



             410)                                                

 

             HEMATOCRIT (BEAKER) (test code = 24.9 %       40.1-51.0    L       

     



             411)                                                



POCT-GLUCOSE [id number] 00:46:00





             Test Item    Value        Reference Range Interpretation Comments

 

             POC-GLUCOSE METER 122 mg/dL           H            TESTED AT 

[facility] [address]



             (BEAKER) (test code =                                        [address] [place]



             [address])                                               [address]



HEMOGLOBIN AND [id number] 00:12:00





             Test Item    Value        Reference Range Interpretation Comments

 

             HEMOGLOBIN (BEAKER) (test code = 6.6 GM/DL    13.7-17.5    L       

     



             410)                                                

 

             HEMATOCRIT (BEAKER) (test code = 21.6 %       40.1-51.0    L       

     



             411)                                                



URINALYSIS W/ [id number] 19:27:00





             Test Item    Value        Reference Range Interpretation Comments

 

             COLOR (BEAKER) (test code = Yellow                                 



             470)                                                

 

             CLARITY (BEAKER) (test code = Clear                                

  



             469)                                                

 

             SPECIFIC GRAVITY UA (BEAKER) 1.012        1.001-1.035              

 



             (test code = 468)                                        

 

             PH UA (BEAKER) (test code = 7.5          5.0-8.0                   



             467)                                                

 

             PROTEIN UA (BEAKER) (test code Negative     Negative               

   



             = 464)                                              

 

             GLUCOSE UA (BEAKER) (test code Negative     Negative               

   



             = 365)                                              

 

             KETONES UA (BEAKER) (test code Trace        Negative     A         

   



             = 371)                                              

 

             BILIRUBIN UA (BEAKER) (test Negative     Negative                  



             code = 462)                                         

 

             BLOOD UA (BEAKER) (test code = Negative     Negative               

   



             461)                                                

 

             NITRITE UA (BEAKER) (test code Negative     Negative               

   



             = 465)                                              

 

             LEUKOCYTE ESTERASE UA (BEAKER) Negative     Negative               

   



             (test code = 466)                                        

 

             UROBILINOGEN UA (BEAKER) (test 0.2 mg/dL    0.2-1.0                

   



             code = 463)                                         

 

             RBC UA (BEAKER) (test code = < /HPF                                

 



             519)                                                

 

             WBC UA (BEAKER) (test code = 0 /HPF                                

 



             520)                                                

 

             SOURCE(BEAKER) (test code = Urine, Voided                          

 



             3393)                                               



POCT-GLUCOSE [id number] 18:35:00





             Test Item    Value        Reference Range Interpretation Comments

 

             POC-GLUCOSE METER 191 mg/dL           H            TESTED AT 

[facility] [address]



             (BEAKER) (test code =                                        [facility]



             1538)                                               [address]



BILIRUBIN, [id number] 18:14:00





             Test Item    Value        Reference Range Interpretation Comments

 

             BILIRUBIN DIRECT (BEAKER) (test 0.6 mg/dL    0.1-0.5      H        

    



             code = 706)                                         



HEMOGLOBIN AND [id number] 17:41:00





             Test Item    Value        Reference Range Interpretation Comments

 

             HEMOGLOBIN (BEAKER) (test code = 7.0 GM/DL    13.7-17.5    L       

     



             410)                                                

 

             HEMATOCRIT (BEAKER) (test code = 22.9 %       40.1-51.0    L       

     



             411)                                                



T4, [id number] 13:51:00





             Test Item    Value        Reference Range Interpretation Comments

 

             FREE T4 (BEAKER) (test code = 655) 0.86 ng/dL   0.70-1.48          

       



[id number] 13:51:00





             Test Item    Value        Reference Range Interpretation Comments

 

             THYROID STIMULATING HORMONE 0.36 uIU/mL  0.35-4.94                 



             (BEAKER) (test code = 772)                                        



HEPATIC FUNCTION [id number] 13:26:00





             Test Item    Value        Reference Range Interpretation Comments

 

             TOTAL PROTEIN (BEAKER) (test code = 7.2 gm/dL    6.0-8.3           

        



             770)                                                

 

             ALBUMIN (BEAKER) (test code = 1145) 3.1 g/dL     3.5-5.0      L    

        

 

             BILIRUBIN TOTAL (BEAKER) (test code 3.1 mg/dL    0.2-1.2      H    

        



             = 377)                                              

 

             BILIRUBIN DIRECT (BEAKER) (test 0.6 mg/dL    0.1-0.5      H        

    



             code = 706)                                         

 

             ALKALINE PHOSPHATASE (BEAKER) (test 87 U/L                   

        



             code = 346)                                         

 

             AST (SGOT) (BEAKER) (test code = 32 U/L       5-34                 

     



             353)                                                

 

             ALT (SGPT) (BEAKER) (test code = 18 U/L       6-55                 

     



             347)                                                



Specimen slightly ictericHEMOGLOBIN AND [id number] 12:29:00





             Test Item    Value        Reference Range Interpretation Comments

 

             HEMOGLOBIN (BEAKER) (test code = 7.2 GM/DL    13.7-17.5    L       

     



             410)                                                

 

             HEMATOCRIT (BEAKER) (test code = 23.8 %       40.1-51.0    L       

     



             411)                                                



BASIC METABOLIC [id number] 12:03:00





             Test Item    Value        Reference Range Interpretation Comments

 

             SODIUM (BEAKER) 140 meq/L    136-145                   



             (test code = 381)                                        

 

             POTASSIUM (BEAKER) 4.7 meq/L    3.5-5.1                   



             (test code = 379)                                        

 

             CHLORIDE (BEAKER) 108 meq/L           H            



             (test code = 382)                                        

 

             CO2 (BEAKER) (test 24 meq/L     22-29                     



             code = 355)                                         

 

             BLOOD UREA NITROGEN 22 mg/dL     7-21         H            



             (BEAKER) (test code                                        



             = 354)                                              

 

             CREATININE (BEAKER) 0.78 mg/dL   0.57-1.25                 



             (test code = 358)                                        

 

             GLUCOSE RANDOM 155 mg/dL           H            



             (BEAKER) (test code                                        



             = 652)                                              

 

             CALCIUM (BEAKER) 8.1 mg/dL    8.4-10.2     L            



             (test code = 697)                                        

 

             EGFR (BEAKER) (test 107 mL/min/1.73                           ESTIM

ATED GFR IS



             code = 1092) sq m                                   NOT AS ACCURATE

 AS



                                                                 CREATININE



                                                                 CLEARANCE IN



                                                                 PREDICTING



                                                                 GLOMERULAR



                                                                 FILTRATION RATE

.



                                                                 ESTIMATED GFR I

S



                                                                 NOT APPLICABLE 

FOR



                                                                 DIALYSIS PATIEN

TS.



Specimen slightly ictericRAD, CHEST, 1 VIEW, NON [id number] 11:40:00
Referring: Dr. Qiana Joshi for exam:-&gt;pulmonary insufficiencyShould
 this be performed at the bedside?-&gt;YesFINAL REPORT PATIENT ID: [id number] 
Chest one view AP 2017 11:40 AM CLINICAL HISTORY: pulmonary insufficiency 
COMPARISON: None available FINDINGS: The lungs are clear. Cardiomediastinal 
contours are within normal limits. The central pulmonary vasculature is not 
engorged. IMPRESSION: Unremarkable frontal chest radiograph. Signed: Seipel, Timothy MDReport Verified Date/Time: 2017 11:40:03 Reading Location: [facility] Radiology Reading Room Electronically signed by: TIMOTHY J SEIPEL, M.D. on 2017 11:40 AMCBC W/PLT COUNT &amp; AUTO [id number] 
10:54:00





             Test Item    Value        Reference Range Interpretation Comments

 

             WHITE BLOOD CELL COUNT 6.1 K/ L     3.5-10.5                  



             (BEAKER) (test code =                                        



             775)                                                

 

             RED BLOOD CELL COUNT 3.06 M/ L    4.63-6.08    L            



             (BEAKER) (test code =                                        



             761)                                                

 

             HEMOGLOBIN (BEAKER) 7.2 GM/DL    13.7-17.5    L            



             (test code = 410)                                        

 

             HEMATOCRIT (BEAKER) 23.7 %       40.1-51.0    L            



             (test code = 411)                                        

 

             MEAN CORPUSCULAR VOLUME 77.5 fL      79.0-92.2    L            



             (BEAKER) (test code =                                        



             753)                                                

 

             MEAN CORPUSCULAR 23.5 pg      25.7-32.2    L            



             HEMOGLOBIN (BEAKER)                                        



             (test code = 751)                                        

 

             MEAN CORPUSCULAR 30.4 GM/DL   32.3-36.5    L            



             HEMOGLOBIN CONC                                        



             (BEAKER) (test code =                                        



             752)                                                

 

             RED CELL DISTRIBUTION 18.6 %       11.6-14.4    H            



             WIDTH (BEAKER) (test                                        



             code = 412)                                         

 

             PLATELET COUNT (BEAKER) 69 K/CU MM   150-450      L            



             (test code = 756)                                        

 

             MEAN PLATELET VOLUME  fL          9.4-12.4                  Unable 

to report due



             (BEAKER) (test code =                                        to abn

ormal Platelet



             754)                                                population



                                                                 distribution.

 

             NUCLEATED RED BLOOD 0 /100 WBC   0-0                       



             CELLS (BEAKER) (test                                        



             code = 413)                                         

 

             NEUTROPHILS RELATIVE 70 %                                   



             PERCENT (BEAKER) (test                                        



             code = 429)                                         

 

             LYMPHOCYTES RELATIVE 16 %                                   



             PERCENT (BEAKER) (test                                        



             code = 430)                                         

 

             MONOCYTES RELATIVE 13 %                                   



             PERCENT (BEAKER) (test                                        



             code = 431)                                         

 

             EOSINOPHILS RELATIVE 0 %                                    



             PERCENT (BEAKER) (test                                        



             code = 432)                                         

 

             BASOPHILS RELATIVE 1 %                                    



             PERCENT (BEAKER) (test                                        



             code = 437)                                         

 

             NEUTROPHILS ABSOLUTE 4.25 K/ L    1.78-5.38                 



             COUNT (BEAKER) (test                                        



             code = 670)                                         

 

             LYMPHOCYTES ABSOLUTE 0.98 K/ L    1.32-3.57    L            



             COUNT (BEAKER) (test                                        



             code = 414)                                         

 

             MONOCYTES ABSOLUTE 0.79 K/ L    0.30-0.82                 



             COUNT (BEAKER) (test                                        



             code = 415)                                         

 

             EOSINOPHILS ABSOLUTE 0.00 K/ L    0.04-0.54    L            



             COUNT (BEAKER) (test                                        



             code = 416)                                         

 

             BASOPHILS ABSOLUTE 0.04 K/ L    0.01-0.08                 



             COUNT (BEAKER) (test                                        



             code = 417)                                         

 

             IMMATURE     1 %          0-1                       



             GRANULOCYTES-RELATIVE                                        



             PERCENT (BEAKER) (test                                        



             code = 2801)                                        



PROTHROMBIN TIME/[id number] 10:50:00





             Test Item    Value        Reference Range Interpretation Comments

 

             PROTIME (BEAKER) (test code = 17.5 seconds 11.7-14.7    H          

  



             759)                                                

 

             INR (BEAKER) (test code = 370) 1.4          <=5.9                  

   



RECOMMENDED COUMADIN/WARFARIN INR THERAPY RANGESSTANDARD DOSE: 2.0 - 3.0 
Includes: PROPHYLAXIS for venous thrombosis, systemic embolization; TREATMENT 
for venous thrombosis and/or pulmonary embolus.HIGH RISK: Target INR is 2.5-3.5 
for patients with mechanical heart valves.

## 2022-08-10 NOTE — ER
Nurse's Notes                                                                                     

 [facility]                                                                 

Name: Emanuel Burch                                                                             

Age: 52 yrs                                                                                       

Sex: Male                                                                                         

: 1970                                                                                   

MRN: [id number]                                                                                   

Arrival Date: 08/10/2022                                                                          

Time: 09:13                                                                                       

Account#: [id number]                                                                            

Bed 5                                                                                             

Private MD:                                                                                       

Diagnosis: Alcoholic cirrhosis of liver with ascites                                              

                                                                                                  

Presentation:                                                                                     

08/10                                                                                             

09:16 Chief complaint: Patient states: PT reports increase in abd swelling. HX of liver       ss  

      cirrhosis. Pt reports he was in the ER last week and they wanted to transfer him, but       

      he did not want to at that time. Coronavirus screen: Client denies travel out of the        

      [place] in the last 14 days. Ebola Screen: Patient denies exposure to infectious person.       

      Patient denies travel to an Ebola-affected area in the 21 days before illness onset.        

      Initial Sepsis Screen: Does the patient meet any 2 criteria? No. Patient's initial          

      sepsis screen is negative. Does the patient have a suspected source of infection? No.       

      Patient's initial sepsis screen is negative. Risk Assessment: Do you want to hurt           

      yourself or someone else? Patient reports no desire to harm self or others. Onset of        

      symptoms was 2022.                                                                 

09:16 Method Of Arrival: Ambulatory                                                           ss  

09:16 Acuity: CHERYL 3                                                                           ss  

                                                                                                  

Triage Assessment:                                                                                

13:30 Pain: Denies pain.                                                                      ha1 

                                                                                                  

Historical:                                                                                       

- Allergies:                                                                                      

09:18 No Known Allergies;                                                                     ss  

- PMHx:                                                                                           

09:18 Cirrhosis; esophageal varices;                                                          ss  

- PSHx:                                                                                           

09:18 TIPS procedure; ankle repair;                                                           ss  

                                                                                                  

- Immunization history:: Client reports receiving the 2nd dose of the Covid vaccine.              

- Social history:: Smoking status: Patient denies any tobacco usage or history of.                

                                                                                                  

                                                                                                  

Screenin:19 Abuse screen: Denies threats or abuse.                                                  ha1 

09:19 Nutritional screening: No deficits noted. Tuberculosis screening: No symptoms or risk   ha1 

      factors identified. Fall Risk.                                                              

                                                                                                  

Assessment:                                                                                       

09:20 General: Appears comfortable, Behavior is calm, cooperative. Neuro: Level of            ha1 

      Consciousness is awake, alert, obeys commands, Oriented to person, place, time,             

      situation. Cardiovascular: Heart tones S1 S2 present Capillary refill < 3 seconds in        

      bilateral. Respiratory: Reports shortness of breath on exertion Airway is patent            

      Respiratory effort is even, unlabored, Respiratory pattern is regular, symmetrical. GI:     

      Abdomen is round distended, pt. states being diagnosed with cirrhosis Bowel sounds          

      present X 4 quads. Abdomen is tender to palpation X 4 quads. : No signs and/or            

      symptoms were reported regarding the genitourinary system. EENT: Sclera/Cornea              

      Jaundiced. Derm: Skin is intact, Skin is normal. Musculoskeletal: Range of motion:          

      intact in all extremities, reports SOB with walking activities and gets weak.               

10:05 Reassessment: Patient and/or family updated on plan of care and expected duration. Pain ha1 

      level reassessed. Patient is alert, oriented x 3, equal unlabored respirations, skin        

      warm/dry/pink.                                                                              

10:50 Reassessment: Patient is alert, oriented x 3, equal unlabored respirations, skin        ha1 

      warm/dry/pink. going to [facility].                                                           

11:44 Reassessment: Patient and/or family updated on plan of care and expected duration. Pain ha1 

      level reassessed. Patient is alert, oriented x 3, equal unlabored respirations, skin        

      warm/dry/pink. pt. states " they removed three liters during the procedure" Patient         

      denies pain at this time. Patient states symptoms have improved.                            

12:40 Reassessment: Patient and/or family updated on plan of care and expected duration. Pain ha1 

      level reassessed. Patient is alert, oriented x 3, equal unlabored respirations, skin        

      warm/dry/pink.                                                                              

13:30 Reassessment: Patient and/or family updated on plan of care and expected duration. Pain ha1 

      level reassessed. Patient is alert, oriented x 3, equal unlabored respirations, skin        

      warm/dry/pink.                                                                              

                                                                                                  

Vital Signs:                                                                                      

09:16  / 85; Pulse 114; Resp 22; Temp 98.1(TE); Pulse Ox 100% on R/A; Weight 86.18 kg;  ss  

      Height 5 ft. 8 in. (172.72 cm); Pain 3/10;                                                  

09:20  / 74; Pulse 100; Resp 19 S; Pulse Ox 99% ;                                       ha1 

10:05  / 72; Pulse 92; Resp 19 S; Pulse Ox 100% on R/A;                                 ha1 

11:40  / 68; Pulse 88; Resp 19 S; Pulse Ox 100% on R/A;                                 ha1 

12:30  / 73; Pulse 86; Resp 18 S; Pulse Ox 100% on R/A;                                 ha1 

13:30  / 78; Pulse 86; Resp 18 S; Pulse Ox 99% on R/A;                                  ha1 

09:16 Body Mass Index 28.89 (86.18 kg, 172.72 cm)                                               

                                                                                                  

ED Course:                                                                                        

09:13 Patient arrived in ED.                                                                  rg4 

09:18 Triage completed.                                                                       ss  

09:18 Arm band placed on right wrist.                                                           

09:19 Bed in low position. Call light in reach. Side rails up X 1.                            ha1 

09:25 Sarita Santos MD is Attending Physician.                                                sp3 

09:45 Bianca Campos, FAWN is Primary Nurse.                                                      ha1 

10:03 Inserted saline lock: 20 gauge in left antecubital area, using aseptic technique.       ha1 

10:03 Initial lab(s) drawn, by me, sent to lab.                                               ha1 

10:23 Ptt, Activated Sent.                                                                    ha1 

10:23 PT-INR Sent.                                                                            ha1 

10:23 CMP Sent.                                                                               ha1 

10:23 CBC with Diff Sent.                                                                     ha1 

11:39 Paracentesis Proc Guidance In Process Unspecified.                                      EDMS

13:30 No provider procedures requiring assistance completed.                                  ha1 

13:30 IV discontinued, intact, bleeding controlled, No redness/swelling at site. Pressure     ha1 

      dressing applied.                                                                           

                                                                                                  

Administered Medications:                                                                         

No medications were administered                                                                  

                                                                                                  

                                                                                                  

Medication:                                                                                       

13:30 VIS not applicable for this client.                                                     ha1 

                                                                                                  

Outcome:                                                                                          

12:14 Discharge ordered by MD.                                                                sp3 

13:30 Discharged to home ambulatory.                                                          ha1 

13:30 Condition: stable                                                                           

13:30 Discharge instructions given to patient, Instructed on discharge instructions, follow   ha1 

      up and referral plans. Demonstrated understanding of instructions, follow-up care.          

13:41 Patient left the ED.                                                                    iw  

                                                                                                  

Signatures:                                                                                       

Dispatcher [employer]                           Odette Acharya RN RN iw Smirch, Shelby, RN RN                                                      

Vianca Hewitt                                 rg4                                                  

Sarita Santos MD MD   sp3                                                  

Bianca Campos RN                        RN   ha1                                                  

                                                                                                  

Corrections: (The following items were deleted from the chart)                                    

11:04 11:02 Reassessment: Patient and/or family updated on plan of care and expected          ha1 

      duration. Pain level reassessed. ha1                                                        

                                                                                                  

**************************************************************************************************

## 2022-08-15 ENCOUNTER — HOSPITAL ENCOUNTER (EMERGENCY)
Dept: HOSPITAL 97 - ER | Age: 52
Discharge: HOME | End: 2022-08-15
Payer: SELF-PAY

## 2022-08-15 VITALS — TEMPERATURE: 97.7 F | OXYGEN SATURATION: 100 % | DIASTOLIC BLOOD PRESSURE: 66 MMHG | SYSTOLIC BLOOD PRESSURE: 128 MMHG

## 2022-08-15 DIAGNOSIS — T81.89XA: Primary | ICD-10-CM

## 2022-08-15 PROCEDURE — 99283 EMERGENCY DEPT VISIT LOW MDM: CPT

## 2022-08-15 NOTE — XMS REPORT
Continuity of Care Document

                           Created on:August 15, 2022



Patient:MEHUL CESAR

Sex:Male

:1970

External Reference #:[id number]





Demographics







                          Address                   [address] 



                                                    [place] [address]

 

                          Home Phone                [phone]

 

                          Mobile Phone              [phone]

 

                          Email Address             [email or web address]

 

                          Preferred Language        English

 

                          Marital Status            Unknown

 

                          Evangelical Affiliation     Unknown

 

                          Race                      Unknown

 

                          Additional Race(s)        Unavailable

 

                          Ethnic Group              Unknown









Author







                          Organization              [facility]

t

 

                          Address                   [address]



                                                    [place] [address]

 

                          Phone                     [phone]









Support







                Name            Relationship    Address         Phone

 

                MIGUEL ANGEL CESAR               [address]available



                                                [address]             



                                                [place] [address] 









Care Team Providers







                    Name                Role                Phone

 

                    OWENRADHAJERRODCASSIDY PASCAL Primary Care Physician Unavailable

 

                    JONNA MAHMOOD    Attending Clinician Unavailable

 

                    MARGE HOPKINS   Attending Clinician Unavailable

 

                    ENA KNOX      Attending Clinician Unavailable

 

                    MELINDA MEDINA  Attending Clinician Unavailable

 

                    ESTRELLITA POSEY  Attending Clinician Unavailable

 

                    MACEY OLIVAREZ Attending Clinician Unavailable

 

                    JONNA MAHMOOD    Admitting Clinician Unavailable

 

                    MARGE HOPKINS   Admitting Clinician Unavailable

 

                    ENA KNOX      Admitting Clinician Unavailable

 

                    MACEY OLIVAREZ Admitting Clinician Unavailable









Payers







           Payer Name Policy Type Policy Number Effective Date Expiration Date LAURENCE HERNANDEZ OPEN ACCESS            [id number]  2013 



           [employer]                          00:00:00   00:00:00   

 

           [facility]            [id number] 2019            



                                            00:00:00              







Problems

This patient has no known problems.



Allergies, Adverse Reactions, Alerts







       Allergy Allergy Status Severity Reaction(s) Onset  Inactive Treating Comm

ents 

Source



       Name   Type                        Date   Date   Clinician        

 

       NO KNOWN Allergy Active                                           [facility]



       ALLERGIE                                                         



       S                                                              







Medications

This patient has no known medications.



Vital Signs







             Vital Name   Observation Time Observation Value Comments     Source

 

             HEIGHT       2021 15:18:00 172.7 cm                  

 

             WEIGHT       2021 15:18:00 81.194 kg                 

 

             HEIGHT       2021 15:18:00 172.7 cm                  

 

             WEIGHT       2021 15:18:00 81.194 kg                 

 

             HEIGHT       2021 11:00:00 172.7 cm                  

 

             WEIGHT       2021 11:00:00 81.194 kg                 

 

             HEIGHT       2021 11:00:00 172.7 cm                  

 

             WEIGHT       2021 11:00:00 81.194 kg                 







Procedures

This patient has no known procedures.



Encounters







        Start   End     Encounter Admission Attending Care    Care    Encounter 

Source



        Date/Time Date/Time Type    Type    Clinicians Facility Department ID   

   

 

        2021-10-09         Outpatient         TIMOTEO [facility]    Surgery [id number]

 [facility]



        01:00:26                         JONNA                          

 

        2021         Inpatient UR      NALAM  [facility]    Internal [id number]

 [facility]



        14:51:00                         MARGE           Med             

 

        2021 Outpatient HAYES MEDINA [facility]    7

[id number] [facility]



        00:00:00 00:00:00                 RISE                            

 

        2021 Outpatient [facility]    [id number]

[id number] [facility]



        00:00:00 00:00:00                                                 

 

        2021 Outpatient       ABILIO ENA [facility]    Radiology 2

[id number] [facility]



        06:16:51 09:51:00                                                 

 

        2021 Outpatient [facility]    [id number]

[id number] [facility]



        00:00:00 00:00:00                                                 

 

        2021 Outpatient         [facility]    [id number]

023 [facility]



        00:00:00 00:00:00                 ESTRELLITA                          

 

        2021 Outpatient       KALPESH   [facility]    [id number]

022 [facility]



        00:00:00 00:00:00                 ESTRELLITA                          

 

        2021 Outpatient                 [facility]    [id number]

[id number] [facility]



        00:00:00 00:00:00                                                 

 

        2019-12-10 2019-12-10 Outpatient [facility]    [id number]

253 [facility]



        00:00:00 00:00:00                                                 







Results







           Test Description Test Time  Test Comments Results    Result Comments 

Source









                    HEPATIC FUNCTION PANEL 2022 01:12:10 









                      Test Item  Value      Reference Range Interpretation Comme

nts









             PROTEIN, TOTAL (test code = 7.9 G/DL     6.1-8.3                   



             )                                               

 

             ALBUMIN (test code = 2201) 2.0 G/DL     3.5-5.2      L            

 

             BILIRUBIN, TOTAL (test code = 11.3 MG/DL   See_Comment  H          

   [Automated message] The



             )                                               system which ge

nerated this



                                                                 result transmit

susi reference



                                                                 range: <=1.2. T

he reference



                                                                 range was not u

sed to



                                                                 interpret this 

result as



                                                                 normal/abnormal

.

 

             BILIRUBIN, DIRECT (test code = 3.6 MG/DL    0.0-0.3      H         

   



             )                                               

 

             ALKALINE PHOSPHATASE (test 101 U/L                          



             code = 2204)                                        

 

             AST (test code = 2218) 49 U/L       9-50                      

 

             ALT (test code = 2219) 25 U/L       5-50                      



CBC W/AUTO DIFF WITH [id number] 04:37:27





             Test Item    Value        Reference Range Interpretation Comments

 

             WBC (test code = 4.8 K/UL     3.5-11.0                  



             1001)                                               

 

             RBC (test code = 3.14 M/UL    4.50-6.10    L            



             1002)                                               

 

             HEMOGLOBIN (test 11.7 G/DL    13.5-17.0    L            



             code = 1003)                                        

 

             HEMATOCRIT (test 32.9 %       40.0-51.0    L            



             code = 1004)                                        

 

             MCV (test code = 104.8 fL     80.0-99.0    H            



             1005)                                               

 

             MCH (test code = 37.3 PG      25.0-33.0    H            



             1006)                                               

 

             MCHC (test code = 35.6 G/DL    31.0-36.0                 



             1007)                                               

 

             RDW (test code = 12.8 %       11.5-15.0                 



             1038)                                               

 

             NEUTROPHILS (test 58.4 %                                  AUTOMATED



             code = 1008)                                        DIFFERENTIAL



                                                                 CONFIRMED  WITH



                                                                 MANUAL SLIDE RE

VIEW.

 

             LYMPHOCYTES (test 18.6 %                                 



             code = 1010)                                        

 

             MONOCYTES (test code 15.9 %                                 



             = 1011)                                             

 

             EOSINOPHILS (test 5.0 %                                  



             code = 1012)                                        

 

             BASOPHILS (test code 1.3 %                                  



             = 1013)                                             

 

             IMMATURE     0.8 %                                  



             GRANULOCYTES (test                                        



             code = 1036)                                        

 

             NUCLEATED RBCS (test 0.0 /100     See_Comment                [Autom

ated message]



             code = 1065) WBC'S                                  The system whic

h



                                                                 generated this 

result



                                                                 transmitted ref

erence



                                                                 range: 0.0. The



                                                                 reference range

 was



                                                                 not used to int

erpret



                                                                 this result as



                                                                 normal/abnormal

.

 

             PLATELET COUNT (test 92 K/UL      130-400      L            



             code = 1015)                                        

 

             ABSOLUTE NEUTROPHILS 2.80 K/UL    1.50-7.50                 



             (test code = 1066)                                        

 

             ABSOLUTE LYMPHOCYTES 0.89 K/UL    1.00-4.00    L            



             (test code = 1067)                                        

 

             ABSOLUTE MONOCYTES 0.76 K/UL    0.20-1.00                 



             (test code = 1068)                                        

 

             ABSOLUTE EOSINOPHILS 0.24 K/UL    0.00-0.50                 



             (test code = 1040)                                        

 

             ABSOLUTE BASOPHILS 0.06 K/UL    0.00-0.20                 



             (test code = 1069)                                        

 

             ABS IMMATURE 0.04 K/UL    0.00-0.10                 



             GRANULOCYTES (test                                        



             code = 1020)                                        

 

             ABS NUCLEATED RBCS 0.02 K/UL    0.00-0.11                 



             (test code = 68527)                                        

 

             COMMENTS (test code (NOTE)                                  SLIGHT 

MACROCYTOSIS



             = 1016)                                             SEE ADDITIONAL



                                                                 COMMENTS BELOW:



                                                                 PLATELET CLUMPI

NG



                                                                 PRESENT; TRUE



                                                                 PLATELET COUNT 

MAY BE



                                                                 HIGHER. IF CLIN

ICALLY



                                                                 INDICATED, ORDE

R



                                                                 REPEAT PLATELET

 COUNT



                                                                 WITH CITRATE



                                                                 ANTICOAGULATED 

TUBE



                                                                 (CPL ORDER CODE



                                                                 1047). UNLESS



                                                                 OTHERWISE INDIC

ATED,



                                                                 ALL TESTING PER

FORMED



                                                                 ATCLINICAL PATH

[facility], [facility].



                                                                 [address] [place], [place] [address] [place] [address]



                                                                 DIRECTOR: BETH MESA M.D.

 CLIA



                                                                 NUMBER [id number]

[id number] CAP



                                                                 ACCREDITATION N

O.



                                                                 [id number]



MISCELLANEOUS LAB [id number] 13:01:00





             Test Item    Value        Reference Range Interpretation Comments

 

             SCAN RESULT (test code = 3575402)                                  

      



BASIC METABOLIC [id number] 14:32:00





             Test Item    Value        Reference Range Interpretation Comments

 

             SODIUM (BEAKER) 138 meq/L    136-145                   



             (test code = 381)                                        

 

             POTASSIUM (BEAKER) 3.8 meq/L    3.5-5.1                   



             (test code = 379)                                        

 

             CHLORIDE (BEAKER) 107 meq/L                        



             (test code = 382)                                        

 

             CO2 (BEAKER) (test 24 meq/L     22-29                     



             code = 355)                                         

 

             BLOOD UREA NITROGEN 4 mg/dL      7-21         L            



             (BEAKER) (test code                                        



             = 354)                                              

 

             CREATININE (BEAKER) 0.83 mg/dL   0.57-1.25                 



             (test code = 358)                                        

 

             GLUCOSE RANDOM 60 mg/dL            L            



             (BEAKER) (test code                                        



             = 652)                                              

 

             CALCIUM (BEAKER) 8.6 mg/dL    8.4-10.2                  



             (test code = 697)                                        

 

             EGFR (BEAKER) (test 98 mL/min/1.73                           ESTIMA

SUSI GFR IS



             code = 1092) sq m                                   NOT AS ACCURATE

 AS



                                                                 CREATININE



                                                                 CLEARANCE IN



                                                                 PREDICTING



                                                                 GLOMERULAR



                                                                 FILTRATION RATE

.



                                                                 ESTIMATED GFR I

S



                                                                 NOT APPLICABLE 

FOR



                                                                 DIALYSIS PATIEN

TS.



 ID - RHONDA Pearceecimen moderately ictericHEPATIC FUNCTION [id number] 
14:32:00





             Test Item    Value        Reference Range Interpretation Comments

 

             TOTAL PROTEIN (BEAKER) (test code = 7.8 gm/dL    6.0-8.3           

        



             770)                                                

 

             ALBUMIN (BEAKER) (test code = 1145) 2.5 g/dL     3.5-5.0      L    

        

 

             BILIRUBIN TOTAL (BEAKER) (test code 5.8 mg/dL    0.2-1.2      H    

        



             = 377)                                              

 

             BILIRUBIN DIRECT (BEAKER) (test 2.5 mg/dL    0.1-0.5      H        

    



             code = 706)                                         

 

             ALKALINE PHOSPHATASE (BEAKER) (test 163 U/L             H    

        



             code = 346)                                         

 

             AST (SGOT) (BEAKER) (test code = 47 U/L       5-34         H       

     



             353)                                                

 

             ALT (SGPT) (BEAKER) (test code = 18 U/L       6-55                 

     



             347)                                                



 ID - RHONDA Mckeonimedolly moderately ictericPROTHROMBIN TIME/[id number] 
14:23:00





             Test Item    Value        Reference Range Interpretation Comments

 

             PROTIME (BEAKER) 21.9 seconds 11.9-14.2    H            



             (test code = 759)                                        

 

             INR (BEAKER) (test 1.97         See_Comment                [Automat

ed message]



             code = 370)                                         The system [employer]



                                                                 generated this 

result



                                                                 transmitted ref

erence



                                                                 range: <=5.90. 

The



                                                                 reference range

 was



                                                                 not used to int

erpret



                                                                 this result as



                                                                 normal/abnormal

.



Effective 2019: PT Reference Range ChangeNew: 11.9-14.2 Previous: 11.7-
14.7RECOMMENDED COUMADIN/WARFARIN INR THERAPY RANGESSTANDARD DOSE: 2.0-3.0 
Includes: PROPHYLAXIS for venous thrombosis, systemic embolization; TREATMENT 
for venous thrombosis and/or pulmonary embolus.HIGH RISK: Target INR is 2.5-3.5 
for patients wiht mechanical heart valves.CBC W/PLT COUNT &amp; AUTO 
[id number] 14:21:00





             Test Item    Value        Reference Range Interpretation Comments

 

             WHITE BLOOD CELL COUNT (BEAKER) 4.5 K/ L     3.5-10.5              

    



             (test code = 775)                                        

 

             RED BLOOD CELL COUNT (BEAKER) 3.70 M/ L    4.63-6.08    L          

  



             (test code = 761)                                        

 

             HEMOGLOBIN (BEAKER) (test code = 12.5 GM/DL   13.7-17.5    L       

     



             410)                                                

 

             HEMATOCRIT (BEAKER) (test code = 37.4 %       40.1-51.0    L       

     



             411)                                                

 

             MEAN CORPUSCULAR VOLUME (BEAKER) 101.1 fL     79.0-92.2    H       

     



             (test code = 753)                                        

 

             MEAN CORPUSCULAR HEMOGLOBIN 33.8 pg      25.7-32.2    H            



             (BEAKER) (test code = 751)                                        

 

             MEAN CORPUSCULAR HEMOGLOBIN CONC 33.4 GM/DL   32.3-36.5            

     



             (BEAKER) (test code = 752)                                        

 

             RED CELL DISTRIBUTION WIDTH 14.9 %       11.6-14.4    H            



             (BEAKER) (test code = 412)                                        

 

             PLATELET COUNT (BEAKER) (test 124 K/CU MM  150-450      L          

  



             code = 756)                                         

 

             MEAN PLATELET VOLUME (BEAKER) 11.8 fL      9.4-12.4                

  



             (test code = 754)                                        

 

             NUCLEATED RED BLOOD CELLS 0 /100 WBC   0-0                       



             (BEAKER) (test code = 413)                                        

 

             NEUTROPHILS RELATIVE PERCENT 43 %                                  

 



             (BEAKER) (test code = 429)                                        

 

             LYMPHOCYTES RELATIVE PERCENT 34 %                                  

 



             (BEAKER) (test code = 430)                                        

 

             MONOCYTES RELATIVE PERCENT 12 %                                   



             (BEAKER) (test code = 431)                                        

 

             EOSINOPHILS RELATIVE PERCENT 10 %                                  

 



             (BEAKER) (test code = 432)                                        

 

             BASOPHILS RELATIVE PERCENT 1 %                                    



             (BEAKER) (test code = 437)                                        

 

             NEUTROPHILS ABSOLUTE COUNT 1.92 K/ L    1.78-5.38                 



             (BEAKER) (test code = 670)                                        

 

             LYMPHOCYTES ABSOLUTE COUNT 1.55 K/ L    1.32-3.57                 



             (BEAKER) (test code = 414)                                        

 

             MONOCYTES ABSOLUTE COUNT (BEAKER) 0.55 K/ L    0.30-0.82           

      



             (test code = 415)                                        

 

             EOSINOPHILS ABSOLUTE COUNT 0.45 K/ L    0.04-0.54                 



             (BEAKER) (test code = 416)                                        

 

             BASOPHILS ABSOLUTE COUNT (BEAKER) 0.05 K/ L    0.01-0.08           

      



             (test code = 417)                                        

 

             IMMATURE GRANULOCYTES-RELATIVE 0 %          0-1                    

   



             PERCENT (BEAKER) (test code =                                      

  



             2801)                                               



MISCELLANEOUS LAB [id number] 10:46:00





             Test Item    Value        Reference Range Interpretation Comments

 

             SCAN RESULT (test code = 7544207)                                  

      



BASIC METABOLIC [id number] 12:16:00





             Test Item    Value        Reference Range Interpretation Comments

 

             SODIUM (BEAKER) 135 meq/L    136-145      L            



             (test code = 381)                                        

 

             POTASSIUM (BEAKER) 3.9 meq/L    3.5-5.1                   



             (test code = 379)                                        

 

             CHLORIDE (BEAKER) 107 meq/L                        



             (test code = 382)                                        

 

             CO2 (BEAKER) (test 25 meq/L     22-29                     



             code = 355)                                         

 

             BLOOD UREA NITROGEN 6 mg/dL      7-21         L            



             (BEAKER) (test code                                        



             = 354)                                              

 

             CREATININE (BEAKER) 0.77 mg/dL   0.57-1.25                 



             (test code = 358)                                        

 

             GLUCOSE RANDOM 83 mg/dL                         



             (BEAKER) (test code                                        



             = 652)                                              

 

             CALCIUM (BEAKER) 7.8 mg/dL    8.4-10.2     L            



             (test code = 697)                                        

 

             EGFR (BEAKER) (test 107 mL/min/1.73                           ESTIM

ATED GFR IS



             code = 1092) sq m                                   NOT AS ACCURATE

 AS



                                                                 CREATININE



                                                                 CLEARANCE IN



                                                                 PREDICTING



                                                                 GLOMERULAR



                                                                 FILTRATION RATE

.



                                                                 ESTIMATED GFR I

S



                                                                 NOT APPLICABLE 

FOR



                                                                 DIALYSIS PATIEN

TS.



 ID Dre TOPETE FSpecimen moderately ictericHEPATIC FUNCTION PANEL
2021 12:07:00





             Test Item    Value        Reference Range Interpretation Comments

 

             TOTAL PROTEIN (BEAKER) (test code = 6.6 gm/dL    6.0-8.3           

        



             770)                                                

 

             ALBUMIN (BEAKER) (test code = 1145) 2.3 g/dL     3.5-5.0      L    

        

 

             BILIRUBIN TOTAL (BEAKER) (test code 5.9 mg/dL    0.2-1.2      H    

        



             = 377)                                              

 

             BILIRUBIN DIRECT (BEAKER) (test 2.2 mg/dL    0.1-0.5      H        

    



             code = 706)                                         

 

             ALKALINE PHOSPHATASE (BEAKER) (test 109 U/L                  

        



             code = 346)                                         

 

             AST (SGOT) (BEAKER) (test code = 54 U/L       5-34         H       

     



             353)                                                

 

             ALT (SGPT) (BEAKER) (test code = 24 U/L       6-55                 

     



             347)                                                



 ID Dre TOPETE FSpecimen moderately ictericPROTHROMBIN TIME/INR
2021 12:01:00





             Test Item    Value        Reference Range Interpretation Comments

 

             PROTIME (BEAKER) 24.8 seconds 11.9-14.2    H            



             (test code = 759)                                        

 

             INR (BEAKER) (test 2.31         See_Comment                [Automat

ed message]



             code = 370)                                         The system [employer]



                                                                 generated this 

result



                                                                 transmitted ref

erence



                                                                 range: <=5.90. 

The



                                                                 reference range

 was



                                                                 not used to int

erpret



                                                                 this result as



                                                                 normal/abnormal

.



Effective 2019: PT Reference Range ChangeNew: 11.9-14.2 Previous: 11.7-
14.7RECOMMENDED COUMADIN/WARFARIN INR THERAPY RANGESSTANDARD DOSE: 2.0-3.0 
Includes: PROPHYLAXIS for venous thrombosis, systemic embolization; TREATMENT 
for venous thrombosis and/or pulmonary embolus.HIGH RISK: Target INR is 2.5-3.5 
for patients wiht mechanical heart valves.CBC W/PLT COUNT &amp; AUTO 
[id number] 11:44:00





             Test Item    Value        Reference Range Interpretation Comments

 

             WHITE BLOOD CELL COUNT (BEAKER) 3.7 K/ L     3.5-10.5              

    



             (test code = 775)                                        

 

             RED BLOOD CELL COUNT (BEAKER) 3.30 M/ L    4.63-6.08    L          

  



             (test code = 761)                                        

 

             HEMOGLOBIN (BEAKER) (test code = 11.6 GM/DL   13.7-17.5    L       

     



             410)                                                

 

             HEMATOCRIT (BEAKER) (test code = 34.8 %       40.1-51.0    L       

     



             411)                                                

 

             MEAN CORPUSCULAR VOLUME (BEAKER) 105.5 fL     79.0-92.2    H       

     



             (test code = 753)                                        

 

             MEAN CORPUSCULAR HEMOGLOBIN 35.2 pg      25.7-32.2    H            



             (BEAKER) (test code = 751)                                        

 

             MEAN CORPUSCULAR HEMOGLOBIN CONC 33.3 GM/DL   32.3-36.5            

     



             (BEAKER) (test code = 752)                                        

 

             RED CELL DISTRIBUTION WIDTH 13.9 %       11.6-14.4                 



             (BEAKER) (test code = 412)                                        

 

             PLATELET COUNT (BEAKER) (test code 83 K/CU MM   150-450      L     

       



             = 756)                                              

 

             MEAN PLATELET VOLUME (BEAKER) 12.1 fL      9.4-12.4                

  



             (test code = 754)                                        

 

             NUCLEATED RED BLOOD CELLS (BEAKER) 0 /100 WBC   0-0                

       



             (test code = 413)                                        

 

             NEUTROPHILS RELATIVE PERCENT 46 %                                  

 



             (BEAKER) (test code = 429)                                        

 

             LYMPHOCYTES RELATIVE PERCENT 34 %                                  

 



             (BEAKER) (test code = 430)                                        

 

             MONOCYTES RELATIVE PERCENT 14 %                                   



             (BEAKER) (test code = 431)                                        

 

             EOSINOPHILS RELATIVE PERCENT 5 %                                   

 



             (BEAKER) (test code = 432)                                        

 

             BASOPHILS RELATIVE PERCENT 1 %                                    



             (BEAKER) (test code = 437)                                        

 

             NEUTROPHILS ABSOLUTE COUNT 1.69 K/ L    1.78-5.38    L            



             (BEAKER) (test code = 670)                                        

 

             LYMPHOCYTES ABSOLUTE COUNT 1.24 K/ L    1.32-3.57    L            



             (BEAKER) (test code = 414)                                        

 

             MONOCYTES ABSOLUTE COUNT (BEAKER) 0.53 K/ L    0.30-0.82           

      



             (test code = 415)                                        

 

             EOSINOPHILS ABSOLUTE COUNT 0.20 K/ L    0.04-0.54                 



             (BEAKER) (test code = 416)                                        

 

             BASOPHILS ABSOLUTE COUNT (BEAKER) 0.03 K/ L    0.01-0.08           

      



             (test code = 417)                                        

 

             IMMATURE GRANULOCYTES-RELATIVE 0 %          0-1                    

   



             PERCENT (BEAKER) (test code =                                      

  



             2801)                                               



U/S, [id number] 23:08:00Referring: Dr. Qiana Liu Administer 
200 mL of albumin 25% (50 grams) IV x1 after paracentesis if 3 or more liters 
removed. Send ascitic fluid for cell count and differential. Administer 200 mL 
ofalbumin 25% (50 grams) IV x1 after paracentesis if 3 or more liters removed. 
Send ascitic fluid for cell count and differential. Labs to be ordered:-
&gt;Other (please add comment) Reason for Exam:-&gt;ascites, portal 
hypertension, cirrhosis
************************************************************[facility] : 1970 Sex: 
M************************************************************FINAL REPORT 
PATIENT ID: [id number] Ultrasound guided paracentesis. Clinical History: Ascites. 
Sedation: None. : Denisa Tellez PA-C Assistant: None. Estimated
Blood Loss: &lt; 1 cc. Specimen: 4200 cc of clear yellow fluid, samples sent to 
laboratory. Technique: Informed consent was obtained. The risks of pain, 
bleeding, infection, bowel perforation, injury to adjacent structures, and adv
erse medication reactions were discussed with the patient. After informed 
consent was obtained, the patient's abdomen was scanned. The right upper 
quadrant of the abdomen was selected for paracentesis.After the largest fluid 
pocket area was marked, and the anterior abdominal wall was evaluated with color
Doppler to exclude presence of blood vessels traversing the area, the skin was 
prepped and draped in the usual sterile manner. After local anesthesia was 
achieved with 2% lidocaine, a 5 Spanish one-step catheter was advanced into the 
peritoneal cavity under ultrasound guidance. After completion of drainage, the 
catheter was removed. There was no evidence of complication. 
Impression:Successful ultrasound guided paracentesis. Signed: Latasha Santos 
MDReport Verified Date/Time: 2021 23:08:28 Reading Location: [facility] B1 P006J 
Ultrasound Reading Room Electronically signed by: LATASHA SANTOS MD on 
2021 11:08 PMSARS-COV2/RT-PCR ([facility] &amp; REF LABS)2021 20:09:00





             Test Item    Value        Reference Range Interpretation Comments

 

             SARS-COV2/RT-PCR (test Negative     Not Detected, Negative,        

      



             code = 7199090)              See external report for              



                                       linked test               

 

             SARS-COV-2 PERFORMING LAB [facility]                             



             (test code = 8403077)                                        



Negative result for this test determines that SARS-CoV-2 RNA was not present in 
the specimen above the Limit of Detection (LOD). However, Negative results do 
not preclude SARS-CoV-2 infection and should not be used as the sole basis for 
treatment or patient management decisions. Negative results must be combined 
with clinical observations, patient history, and epidemiological information. A 
false negative result may occur if a specimen is improperly collected, 
transported or handled. A false negative result should be considered if 
patient's recent exposures or clinical presentation indicate that COVID-19 
(SARS-CoV-2) is likely and diagnostic tests for other causes of illness are 
negative. Re-testing should be considered in cases of suspected false 
negatives.The limit of detection for this assay is 800 copies/mL.This SARS CoV-2
test is a real-time RT-PCR test intended for the qualitative detection of 
nucleic acid from SARS-CoV-2 in a nasopharyngeal swab specimen collected from 
individuals suspected of COVID-19 by their healthcare provider.This test has not
been Food and Drug Administration (FDA) cleared or approved. This is a modified 
version of an approved Emergency Use Authorization (EUA) and is in the process 
of review by the FDA. Once authorized by the FDA, the issued EUA will be 
effective until the declaration that circumstances exist justifying the 
authorization of the emergency use ofin vitro diagnostic tests for detection 
and/or diagnosis of COVID-19 is terminated under Section 564(b)(2) of the Act or
the EUA is revoked under Section 564(g) of the Act.Fact Sheet for Healthcare 
Prov
iders:https://www.[employer]/sites/default/files/product/documents/Fact_Sheet_HC

_Nweorflty_Oxgz_IYEQ-FfH-4.pdfFact Sheet for Healthcare 
Patients:https://www.[employer]/sites/default/files/product/docume
nts/Amvo_Ocami_Jusopbeh_Fzbm_YRZT-XpI-9.pdfPerforming Laboratory:[facility].[place], [place] [address]SMTX FLUID CELL COUNT 
WITH [id number] 11:53:00





             Test Item    Value        Reference Range Interpretation Comments

 

             APPEARANCE FLUID (BEAKER) (test Hazy         Clear        A        

    



             code = 510)                                         

 

             COLOR FLUID (BEAKER) (test code Yellow       Colorless, Straw A    

        



             = 511)                                              

 

             RBC FLUID (BEAKER) (test code = 490 /cu mm   <=1          H        

    



             513)                                                

 

             ADJUSTED WBC FLUID (BEAKER) 474 /cu mm   <=5          H            



             (test code = 1691)                                        

 

             LINING CELLS (BEAKER) (test 28 /cu mm    <=1          H            



             code = 1590)                                        

 

             NEUTROPHILS FLUID (BEAKER) 10 %                                   



             (test code = 1656)                                        

 

             LYMPHS FLUID (BEAKER) (test 55 %                                   



             code = 488)                                         

 

             MONO/MACROPHAGE FLUID (BEAKER) 35 %                                

   



             (test code = 489)                                        

 

             EOSINOPHILS FLUID (BEAKER) 0 %                                    



             (test code = 491)                                        

 

             BASO FLUID (BEAKER) (test code 0 %                                 

   



             = 492)                                              

 

             CONTAINER BODY FLUID (BEAKER) Sterile Vial                         

  



             (test code = 2873)                                        



BLOOD [id number] 21:00:00





             Test Item    Value        Reference Range Interpretation Comments

 

             CULTURE (BEAKER) (test No growth in 5 days                         

  



             code = 1095)                                        



BLOOD [id number] 21:00:00





             Test Item    Value        Reference Range Interpretation Comments

 

             CULTURE (BEAKER) (test No growth in 5 days                         

  



             code = 1095)                                        



TISSUE [id number] 18:03:00Surgical Pathology Report Case: [id number] 
Authorizing Provider: Cathryn Frankel MD Collected: 2021 08:47 AM 
Ordering Location: [address] Received: 2021 08:23 AM Service 
Pathologist: Geronimo Garrison MD  Specimen: Polyp, Colon - Left/Descending, x3 
COLON, LEFT/DESCENDING COLON POLYPS X 3, POLYPECTOMY-  TUBULAR ADENOMA, 
FRAGMENTS OF- HIGH GRADE DYSPLASIA OR CARCINOMA NOT SEEN Signing Pathologist 
Direct Phone Line: [phone] signed by Geronimo Garrison MD on  at 6:03 [id number] CANCER SCREENINGDescending colonReceived in 
formalin labeled the patient's name, accession number and "descending colon 
polyp" are 3 tan-pink tissue fragments measuring up to 0.2 cm in greatest 
dimension which are filtered and submitted in toto in A1.RAISSA Ha, HT 
(ASCP)Performed.[facility], Department of Pathology, [address] [place] [address], Tel [phone] of the Valley [facility], Department of Pathology, [address] [place] [address], Tel 
[phone] of the Valley Medical [facility], Department of Pathology, [address] [place] [address], Tel [phone], CTA CORONARY WITH CALCIUM
EVAL AND FFR [id number] 15:35:00Referring: Dr. Qiana Liu
************************************************************[facility] : 1970 Sex: 
M************************************************************Addendum 
BeginsREPORT STATUS:A PATIENT ID: [id number] I agree with the nonvascular findings
with exceptions and emphasis as below:*Cirrhotic liver with a TIPS and small 
volume upper abdominal ascites.*Trace bilateral pleural effusions with 
subsegmental atelectasis. Signed: Aaron Patino MDReport Verified Date/Time: 
2021 15:35:10 Addendum EndsFINAL REPORT PATIENT ID: [id number] CTA Aorta - 
chest with coronary calcium score and coronary CTA:  2021 11:01 PM. 
Comparison: CT chest dated 2021. Clinical History: 51 years old Male now 
referred for evaluation of coronary calcium score and for evaluation of coronary
artery anatomy and possible stenoses. Study was performed in part in order to 
avoid an invasive procedure.. Technique: Multidetector CT scanner. Images were 
obtained before and during the dynamic passage of intravenous contrast material 
with retrospective ECG gating. 3D volume-rendering and multiplanar 
reconstructions were performed interactively by the interpreting physician using
anindependent (Yoono) workstation. Please refer to the contrast sheet scanned 
in the EPIC system for the amount and route of contrast given. This exam was 
performed according to our departmental dose-optimisation programme, which 
includes automated exposure control, adjustment of the mA and/or kV according to
patient size and/or use of iterative reconstruction technique. Dose modulation, 
iterative reconstruction, and/or weight based adjustment of the mA/kV was 
utilized to reduce the radiation dose toas low as reasonably achievable. 
Findings: The visualized thyroid gland appears normal. The chest wall, 
mediastinum, and pericardium are unremarkable. The pulmonary arteries appear 
normal. No significant adenopathy is identified in the axilla, mediastinum, and 
shani. Lung windows reveal no acute abnormalities, except for mild bibasilar 
atelectasis. There is no pulmonary parenchymal mass, infiltrate, or pleural 
effusion. The cardiac chambers demonstrate normal atrioventricular and 
ventriculoarterial concordance, and systemic and pulmonary venous return. The 
cardiac chamber sizes are notable for mild biatrial enlargement. The aortic 
valve is trileaflet, and free from calcifications. The ascending thoracic aorta 
and aortic arch is normal in course, caliber, and contour. The arch vessel 
branching pattern is normal. The imaged arch branch vessels are patent 
proximally. There is no acute aortic pathology, such as dissection, intramural 
hematoma, or contained rupture. Calcium score and high-resolution,ECG 
synchronized computed tomography of the heart with attention to the coronary 
arteries was performed. Coronary calcification was analyzed using the Yoono 
system software. These are the results of the calcificate evaluation (threshold 
= 130 HU): LM: Agatston = 0Volume = 0LAD:Agatston = 182Volume = 151LCX:Agatston 
= 40Volume = 48RCA:Agatston = 278Volume = 
271*****************************************
***********************************Total:Agatston = 500Volume = 470 Reference 
ranges for calcium scores are provided as follows ([facility] Clin Proc 1999; 74: 243-
252): 0-10: minimal [id number]:mild [id number] calcium&gt; 
400significant calcium This calcium score places the patient is &gt; 95th 
percentile for age group. Coronary CT angiogram was performed using standard 
methodology and images analyzed using [employer] software package. CORONARY ANATOMY:
Origins: Normal coronary artery origins.Left Main Coronary Artery: The LM is a 
normal sized vessel that bifurcates into the LAD and LCx. There is no 
significant atherosclerotic change or stenotic disease. Left Anterior Descending
Coronary Artery: The LAD is a normal sized vessel that wraps around the apex. It
gives rise to 2 acute diagonal branches. There is mild to moderate eccentric 
calcification involving the proximal to mid LAD and focal calcification at the 
distal LAD. There is mild (25 -49%)luminal stenosis involving the ostium of the 
LAD with mixed calcific atherosclerotic changes, otherwise the remaining LAD is 
widely patent withminimal luminal irregularities. Left Circumflex Coronary 
Artery: The LCX is a normal sized vessel, which is non-dominant. It gives rise 
to 2 obtuse marginal branches. There is mild focal eccentric calcification at 
the mid circumflex, otherwise there is no significant luminal stenosis noted. 
Right Coronary Artery: The RCA is a normal sized vessel, which is dominant. It 
gives rise to a small conus, basim SA tatyana branch, and 1 acute marginal 
branches. In its distal segment it bifurcates into a PDA andRPL branch. There is
scattered focal calcification with luminal irregularities noted, however there i
s no significant luminal stenosis visualized. ABDOMEN:The limited images of the 
upper abdomen revealliver cirrhosis with evidence of TIPS. Conclusions: 1. 
Quantitative coronary artery calcium Agaston score of 500 and volume score of 
470. This calcium score places the patient is &gt; 95th percentile for age 
group. Coronary arteries have normal origins, and normal branching patterns. The
right coronary artery is dominant. 2. There is mild to moderate eccentric 
calcification involving the proximal to mid LAD and focal calcification at the 
distal LAD. There is mild (25 -49%) luminal stenosis involvingthe ostium of the 
LAD with mixed calcific atherosclerotic changes, otherwise the remaining LAD is 
widely patent with minimal luminal irregularities 3. The left circumflex, and 
right coronary arteries are all widely patent with scattered eccentric mild 
calcification with no significant luminal stenosis. 4. The imaged thoracic aorta
is normal in course, caliber, and contour. 5. Cirrhotic liver with evidence of 
TIPS. An addendum will be dictated regarding the non-vascular findings by the 
Consultant Radiologist. Signed: Jericho Coopereport Verified Date/Time: 
2021 15:08:54 Reading Location:[place] [address] CT Reading Room Electronically 
signed by: AARON PATINO MD on 2021 03:35 PMT SPOT [id number] 
11:53:00





             Test Item    Value        Reference Range Interpretation Comments

 

             T-SPOT TB (BEAKER) (test code = Negative                           

    



             1683)                                               

 

             NEG CONTROL SPOT COUNT (BEAKER) 0                                  

    



             (test code = 1684)                                        

 

             PANEL A SPOT (BEAKER) (test code = 0                               

       



             1685)                                               

 

             PANEL B SPOT (BEAKER) (test code = 0                               

       



             1686)                                               

 

             POS CONTROL SPOT CT (BEAKER) (test 0                               

       



             code = 1687)                                        

 

             SCAN RESULT (test code = 2407200)                                  

      



[id number] 11:38:00





             Test Item    Value        Reference Range Interpretation Comments

 

             T3 TOTAL (BEAKER) (test code = 656) 64 ng/dL                 

        



Reference Range  ng/dLCBC W/PLT COUNT &amp; AUTO [id number] 
05:18:00





             Test Item    Value        Reference Range Interpretation Comments

 

             WHITE BLOOD CELL COUNT 4.1 K/ L     3.5-10.5                  



             (BEAKER) (test code =                                        



             775)                                                

 

             RED BLOOD CELL COUNT 2.95 M/ L    4.63-6.08    L            



             (BEAKER) (test code =                                        



             761)                                                

 

             HEMOGLOBIN (BEAKER) 10.7 GM/DL   13.7-17.5    L            



             (test code = 410)                                        

 

             HEMATOCRIT (BEAKER) 30.7 %       40.1-51.0    L            



             (test code = 411)                                        

 

             MEAN CORPUSCULAR 104.1 fL     79.0-92.2    H            Discordant 

MCV



             VOLUME (BEAKER) (test                                        result

s compared to



             code = 753)                                         previous result

s;



                                                                 clinical correl

ation



                                                                 required.

 

             MEAN CORPUSCULAR 36.3 pg      25.7-32.2    H            



             HEMOGLOBIN (BEAKER)                                        



             (test code = 751)                                        

 

             MEAN CORPUSCULAR 34.9 GM/DL   32.3-36.5                 



             HEMOGLOBIN CONC                                        



             (BEAKER) (test code =                                        



             752)                                                

 

             RED CELL DISTRIBUTION 14.4 %       11.6-14.4                 



             WIDTH (BEAKER) (test                                        



             code = 412)                                         

 

             PLATELET COUNT 111 K/CU MM  150-450      L            



             (BEAKER) (test code =                                        



             756)                                                

 

             MEAN PLATELET VOLUME 11.9 fL      9.4-12.4                  



             (BEAKER) (test code =                                        



             754)                                                

 

             NUCLEATED RED BLOOD 0 /100 WBC   0-0                       



             CELLS (BEAKER) (test                                        



             code = 413)                                         

 

             NEUTROPHILS RELATIVE 50 %                                   



             PERCENT (BEAKER) (test                                        



             code = 429)                                         

 

             LYMPHOCYTES RELATIVE 28 %                                   



             PERCENT (BEAKER) (test                                        



             code = 430)                                         

 

             MONOCYTES RELATIVE 12 %                                   



             PERCENT (BEAKER) (test                                        



             code = 431)                                         

 

             EOSINOPHILS RELATIVE 9 %                                    



             PERCENT (BEAKER) (test                                        



             code = 432)                                         

 

             BASOPHILS RELATIVE 1 %                                    



             PERCENT (BEAKER) (test                                        



             code = 437)                                         

 

             NEUTROPHILS ABSOLUTE 2.04 K/ L    1.78-5.38                 



             COUNT (BEAKER) (test                                        



             code = 670)                                         

 

             LYMPHOCYTES ABSOLUTE 1.16 K/ L    1.32-3.57    L            



             COUNT (BEAKER) (test                                        



             code = 414)                                         

 

             MONOCYTES ABSOLUTE 0.50 K/ L    0.30-0.82                 



             COUNT (BEAKER) (test                                        



             code = 415)                                         

 

             EOSINOPHILS ABSOLUTE 0.37 K/ L    0.04-0.54                 



             COUNT (BEAKER) (test                                        



             code = 416)                                         

 

             BASOPHILS ABSOLUTE 0.04 K/ L    0.01-0.08                 



             COUNT (BEAKER) (test                                        



             code = 417)                                         

 

             IMMATURE     0 %          0-1                       



             GRANULOCYTES-RELATIVE                                        



             PERCENT (BEAKER) (test                                        



             code = 2801)                                        



COMPREHENSIVE METABOLIC [id number] 05:10:00





             Test Item    Value        Reference Range Interpretation Comments

 

             TOTAL PROTEIN 5.5 gm/dL    6.0-8.3      L            



             (BEAKER) (test code =                                        



             770)                                                

 

             ALBUMIN (BEAKER) 1.7 g/dL     3.5-5.0      L            



             (test code = 1145)                                        

 

             ALKALINE PHOSPHATASE 101 U/L                          



             (BEAKER) (test code =                                        



             346)                                                

 

             BILIRUBIN TOTAL 4.9 mg/dL    0.2-1.2      H            



             (BEAKER) (test code =                                        



             377)                                                

 

             SODIUM (BEAKER) (test 134 meq/L    136-145      L            



             code = 381)                                         

 

             POTASSIUM (BEAKER) 3.5 meq/L    3.5-5.1                   



             (test code = 379)                                        

 

             CHLORIDE (BEAKER) 105 meq/L                        



             (test code = 382)                                        

 

             CO2 (BEAKER) (test 24 meq/L     22-29                     



             code = 355)                                         

 

             BLOOD UREA NITROGEN 6 mg/dL      7-21         L            



             (BEAKER) (test code =                                        



             354)                                                

 

             CREATININE (BEAKER) 0.72 mg/dL   0.57-1.25                 



             (test code = 358)                                        

 

             GLUCOSE RANDOM 100 mg/dL                        



             (BEAKER) (test code =                                        



             652)                                                

 

             CALCIUM (BEAKER) 7.0 mg/dL    8.4-10.2     L            



             (test code = 697)                                        

 

             AST (SGOT) (BEAKER) 73 U/L       5-34         H            



             (test code = 353)                                        

 

             ALT (SGPT) (BEAKER) 40 U/L       6-55                      



             (test code = 347)                                        

 

             EGFR (BEAKER) (test 115                                    ESTIMATE

D GFR IS



             code = 1092) mL/min/1.73 sq                           NOT AS ACCURA

TE AS



                          m                                      CREATININE



                                                                 CLEARANCE IN



                                                                 PREDICTING



                                                                 GLOMERULAR



                                                                 FILTRATION RATE

.



                                                                 ESTIMATED GFR I

S



                                                                 NOT APPLICABLE 

FOR



                                                                 DIALYSIS PATIEN

TS.



 ID - EDASISpecimen moderately ictericPROTHROMBIN TIME/[id number] 
04:53:00





             Test Item    Value        Reference Range Interpretation Comments

 

             PROTIME (BEAKER) (test code = 25.8 seconds 11.9-14.2    H          

  



             759)                                                

 

             INR (BEAKER) (test code = 370) 2.45         <=5.90                 

   



Effective 2019: PT Reference Range ChangeNew: 11.9-14.2 Previous: 11.7-
14.7RECOMMENDED COUMADIN/WARFARIN INR THERAPY RANGESSTANDARD DOSE: 2.0-3.0 
Includes: PROPHYLAXIS for venous thrombosis, systemic embolization; TREATMENT 
for venous thrombosis and/or pulmonary embolus.HIGH RISK: Target INR is 2.5-3.5 
for patients wiht mechanical heart valves.COMPREHENSIVE METABOLIC PANEL
2021 06:06:00





             Test Item    Value        Reference Range Interpretation Comments

 

             TOTAL PROTEIN 6.2 gm/dL    6.0-8.3                   



             (BEAKER) (test code =                                        



             770)                                                

 

             ALBUMIN (BEAKER) 1.8 g/dL     3.5-5.0      L            



             (test code = 1145)                                        

 

             ALKALINE PHOSPHATASE 112 U/L                          



             (BEAKER) (test code =                                        



             346)                                                

 

             BILIRUBIN TOTAL 5.6 mg/dL    0.2-1.2      H            



             (BEAKER) (test code =                                        



             377)                                                

 

             SODIUM (BEAKER) (test 132 meq/L    136-145      L            



             code = 381)                                         

 

             POTASSIUM (BEAKER) 3.8 meq/L    3.5-5.1                   



             (test code = 379)                                        

 

             CHLORIDE (BEAKER) 104 meq/L                        



             (test code = 382)                                        

 

             CO2 (BEAKER) (test 24 meq/L     22-29                     



             code = 355)                                         

 

             BLOOD UREA NITROGEN 9 mg/dL      7-21                      



             (BEAKER) (test code =                                        



             354)                                                

 

             CREATININE (BEAKER) 0.81 mg/dL   0.57-1.25                 



             (test code = 358)                                        

 

             GLUCOSE RANDOM 102 mg/dL                        



             (BEAKER) (test code =                                        



             652)                                                

 

             CALCIUM (BEAKER) 7.2 mg/dL    8.4-10.2     L            



             (test code = 697)                                        

 

             AST (SGOT) (BEAKER) 88 U/L       5-34         H            



             (test code = 353)                                        

 

             ALT (SGPT) (BEAKER) 47 U/L       6-55                      



             (test code = 347)                                        

 

             EGFR (BEAKER) (test 100                                    ESTIMATE

D GFR IS



             code = 1092) mL/min/1.73 sq                           NOT AS ACCURA

TE AS



                          m                                      CREATININE



                                                                 CLEARANCE IN



                                                                 PREDICTING



                                                                 GLOMERULAR



                                                                 FILTRATION RATE

.



                                                                 ESTIMATED GFR I

S



                                                                 NOT APPLICABLE 

FOR



                                                                 DIALYSIS PATIEN

TS.



 ID - EDASISpecimen moderately ictericCBC W/PLT COUNT &amp; AUTO 
[id number] 05:40:00





             Test Item    Value        Reference Range Interpretation Comments

 

             WHITE BLOOD CELL COUNT (BEAKER) 6.2 K/ L     3.5-10.5              

    



             (test code = 775)                                        

 

             RED BLOOD CELL COUNT (BEAKER) 3.33 M/ L    4.63-6.08    L          

  



             (test code = 761)                                        

 

             HEMOGLOBIN (BEAKER) (test code = 12.0 GM/DL   13.7-17.5    L       

     



             410)                                                

 

             HEMATOCRIT (BEAKER) (test code = 36.0 %       40.1-51.0    L       

     



             411)                                                

 

             MEAN CORPUSCULAR VOLUME (BEAKER) 108.1 fL     79.0-92.2    H       

     



             (test code = 753)                                        

 

             MEAN CORPUSCULAR HEMOGLOBIN 36.0 pg      25.7-32.2    H            



             (BEAKER) (test code = 751)                                        

 

             MEAN CORPUSCULAR HEMOGLOBIN CONC 33.3 GM/DL   32.3-36.5            

     



             (BEAKER) (test code = 752)                                        

 

             RED CELL DISTRIBUTION WIDTH 14.6 %       11.6-14.4    H            



             (BEAKER) (test code = 412)                                        

 

             PLATELET COUNT (BEAKER) (test 128 K/CU MM  150-450      L          

  



             code = 756)                                         

 

             MEAN PLATELET VOLUME (BEAKER) 11.7 fL      9.4-12.4                

  



             (test code = 754)                                        

 

             NUCLEATED RED BLOOD CELLS 0 /100 WBC   0-0                       



             (BEAKER) (test code = 413)                                        

 

             NEUTROPHILS RELATIVE PERCENT 57 %                                  

 



             (BEAKER) (test code = 429)                                        

 

             LYMPHOCYTES RELATIVE PERCENT 21 %                                  

 



             (BEAKER) (test code = 430)                                        

 

             MONOCYTES RELATIVE PERCENT 10 %                                   



             (BEAKER) (test code = 431)                                        

 

             EOSINOPHILS RELATIVE PERCENT 10 %                                  

 



             (BEAKER) (test code = 432)                                        

 

             BASOPHILS RELATIVE PERCENT 1 %                                    



             (BEAKER) (test code = 437)                                        

 

             NEUTROPHILS ABSOLUTE COUNT 3.48 K/ L    1.78-5.38                 



             (BEAKER) (test code = 670)                                        

 

             LYMPHOCYTES ABSOLUTE COUNT 1.31 K/ L    1.32-3.57    L            



             (BEAKER) (test code = 414)                                        

 

             MONOCYTES ABSOLUTE COUNT (BEAKER) 0.64 K/ L    0.30-0.82           

      



             (test code = 415)                                        

 

             EOSINOPHILS ABSOLUTE COUNT 0.63 K/ L    0.04-0.54    H            



             (BEAKER) (test code = 416)                                        

 

             BASOPHILS ABSOLUTE COUNT (BEAKER) 0.07 K/ L    0.01-0.08           

      



             (test code = 417)                                        

 

             IMMATURE GRANULOCYTES-RELATIVE 1 %          0-1                    

   



             PERCENT (BEAKER) (test code =                                      

  



             2801)                                               



PROTHROMBIN TIME/[id number] 05:23:00





             Test Item    Value        Reference Range Interpretation Comments

 

             PROTIME (BEAKER) (test code = 24.0 seconds 11.9-14.2    H          

  



             759)                                                

 

             INR (BEAKER) (test code = 370) 2.21         <=5.90                 

   



Effective 2019: PT Reference Range ChangeNew: 11.9-14.2 Previous: 11.7-
14.7RECOMMENDED COUMADIN/WARFARIN INR THERAPY RANGESSTANDARD DOSE: 2.0-3.0 
Includes: PROPHYLAXIS for venous thrombosis, systemic embolization; TREATMENT 
for venous thrombosis and/or pulmonary embolus.HIGH RISK: Target INR is 2.5-3.5 
for patients wiht mechanical heart valves.COMPREHENSIVE METABOLIC PANEL
2021 06:43:00





             Test Item    Value        Reference Range Interpretation Comments

 

             TOTAL PROTEIN 5.7 gm/dL    6.0-8.3      L            



             (BEAKER) (test code =                                        



             770)                                                

 

             ALBUMIN (BEAKER) 1.7 g/dL     3.5-5.0      L            



             (test code = 1145)                                        

 

             ALKALINE PHOSPHATASE 77 U/L                           



             (BEAKER) (test code =                                        



             346)                                                

 

             BILIRUBIN TOTAL 6.5 mg/dL    0.2-1.2      H            



             (BEAKER) (test code =                                        



             377)                                                

 

             SODIUM (BEAKER) (test 134 meq/L    136-145      L            



             code = 381)                                         

 

             POTASSIUM (BEAKER) 3.7 meq/L    3.5-5.1                   



             (test code = 379)                                        

 

             CHLORIDE (BEAKER) 106 meq/L                        



             (test code = 382)                                        

 

             CO2 (BEAKER) (test 23 meq/L     22-29                     



             code = 355)                                         

 

             BLOOD UREA NITROGEN 8 mg/dL      7-21                      



             (BEAKER) (test code =                                        



             354)                                                

 

             CREATININE (BEAKER) 0.70 mg/dL   0.57-1.25                 



             (test code = 358)                                        

 

             GLUCOSE RANDOM 82 mg/dL                         



             (BEAKER) (test code =                                        



             652)                                                

 

             CALCIUM (BEAKER) 7.1 mg/dL    8.4-10.2     L            



             (test code = 697)                                        

 

             AST (SGOT) (BEAKER) 85 U/L       5-34         H            



             (test code = 353)                                        

 

             ALT (SGPT) (BEAKER) 44 U/L       6-55                      



             (test code = 347)                                        

 

             EGFR (BEAKER) (test 119                                    ESTIMATE

D GFR IS



             code = 1092) mL/min/1.73 sq                           NOT AS ACCURA

TE AS



                          m                                      CREATININE



                                                                 CLEARANCE IN



                                                                 PREDICTING



                                                                 GLOMERULAR



                                                                 FILTRATION RATE

.



                                                                 ESTIMATED GFR I

S



                                                                 NOT APPLICABLE 

FOR



                                                                 DIALYSIS PATIEN

TS.



 ID - ADMINSpecimen moderately ictericPROTHROMBIN TIME/[id number] 
06:38:00





             Test Item    Value        Reference Range Interpretation Comments

 

             PROTIME (BEAKER) (test code = 24.9 seconds 11.9-14.2    H          

  



             759)                                                

 

             INR (BEAKER) (test code = 370) 2.32         <=5.90                 

   



Effective 2019: PT Reference Range ChangeNew: 11.9-14.2 Previous: 11.7-
14.7RECOMMENDED COUMADIN/WARFARIN INR THERAPY RANGESSTANDARD DOSE: 2.0-3.0 
Includes: PROPHYLAXIS for venous thrombosis, systemic embolization; TREATMENT 
for venous thrombosis and/or pulmonary embolus.HIGH RISK: Target INR is 2.5-3.5 
for patients wiht mechanical heart valves.CBC W/PLT COUNT &amp; AUTO 
[id number] 06:14:00





             Test Item    Value        Reference Range Interpretation Comments

 

             WHITE BLOOD CELL COUNT (BEAKER) 4.0 K/ L     3.5-10.5              

    



             (test code = 775)                                        

 

             RED BLOOD CELL COUNT (BEAKER) 2.93 M/ L    4.63-6.08    L          

  



             (test code = 761)                                        

 

             HEMOGLOBIN (BEAKER) (test code = 10.6 GM/DL   13.7-17.5    L       

     



             410)                                                

 

             HEMATOCRIT (BEAKER) (test code = 31.4 %       40.1-51.0    L       

     



             411)                                                

 

             MEAN CORPUSCULAR VOLUME (BEAKER) 107.2 fL     79.0-92.2    H       

     



             (test code = 753)                                        

 

             MEAN CORPUSCULAR HEMOGLOBIN 36.2 pg      25.7-32.2    H            



             (BEAKER) (test code = 751)                                        

 

             MEAN CORPUSCULAR HEMOGLOBIN CONC 33.8 GM/DL   32.3-36.5            

     



             (BEAKER) (test code = 752)                                        

 

             RED CELL DISTRIBUTION WIDTH 14.5 %       11.6-14.4    H            



             (BEAKER) (test code = 412)                                        

 

             PLATELET COUNT (BEAKER) (test 105 K/CU MM  150-450      L          

  



             code = 756)                                         

 

             MEAN PLATELET VOLUME (BEAKER) 11.8 fL      9.4-12.4                

  



             (test code = 754)                                        

 

             NUCLEATED RED BLOOD CELLS 0 /100 WBC   0-0                       



             (BEAKER) (test code = 413)                                        

 

             NEUTROPHILS RELATIVE PERCENT 54 %                                  

 



             (BEAKER) (test code = 429)                                        

 

             LYMPHOCYTES RELATIVE PERCENT 22 %                                  

 



             (BEAKER) (test code = 430)                                        

 

             MONOCYTES RELATIVE PERCENT 14 %                                   



             (BEAKER) (test code = 431)                                        

 

             EOSINOPHILS RELATIVE PERCENT 9 %                                   

 



             (BEAKER) (test code = 432)                                        

 

             BASOPHILS RELATIVE PERCENT 1 %                                    



             (BEAKER) (test code = 437)                                        

 

             NEUTROPHILS ABSOLUTE COUNT 2.17 K/ L    1.78-5.38                 



             (BEAKER) (test code = 670)                                        

 

             LYMPHOCYTES ABSOLUTE COUNT 0.88 K/ L    1.32-3.57    L            



             (BEAKER) (test code = 414)                                        

 

             MONOCYTES ABSOLUTE COUNT (BEAKER) 0.55 K/ L    0.30-0.82           

      



             (test code = 415)                                        

 

             EOSINOPHILS ABSOLUTE COUNT 0.36 K/ L    0.04-0.54                 



             (BEAKER) (test code = 416)                                        

 

             BASOPHILS ABSOLUTE COUNT (BEAKER) 0.05 K/ L    0.01-0.08           

      



             (test code = 417)                                        

 

             IMMATURE GRANULOCYTES-RELATIVE 1 %          0-1                    

   



             PERCENT (BEAKER) (test code =                                      

  



             2801)                                               



DELL CUEVA [id number] 17:05:00Referring: Dr. Qiana Liu Reason for 
exam:-&gt;Eval TIPS stenosis
************************************************************[facility]  : 1970 Sex: 
M************************************************************FINAL REPORT 
PATIENT ID: [id number] History: Cirrhosis, portal hypertension, decreased 
velocities within indwelling TIPS concerning for stenosis. Modality: Sonography 
and fluoroscopy. Sedation: Moderate sedation was administered. 1.5 mg of Versed 
and 75 mcg of fentanyl IV was used for moderate sedation monitored under my 
direction. Total intra-service time of sedation was 45 minutes. The patient's 
vital signs were monitored throughout the procedure and recorded in the 
patient's medical record by the nurse. : Dheeraj Durán MD. 
Assistant: None. Approach: Right internal jugular vein Estimated blood loss: 
&lt; 5 cc. Specimen: None. Fluoroscopy Time: 6.0 min.Reference Air Kerma (Ka, 
r): 184.4 mGy. Technique: Informed written consent was obtained. Discussion of 
risks, benefits, and alternatives were made with the patient. The patient 
expressed understanding and agreed to proceed. A universal timeout was performed
prior to starting the procedure. All elements maximal sterile barrier technique 
was utilized for this procedure, including utilization of sterile scrub solution
for skin prep,a large sterile sheet to cover the areas of the patient that were 
not prepped, and hand hygiene, mask, head covering, and sterile gown for 
performing radiologist and scrub technologist. Initial ultrasound images 
demonstrate patent right internal jugular vein. Using ultrasound guidance, 
following acquisition of permanent images, the right internal jugular vein was 
accessed using a 21-gauge micropuncture needle. A 0.018 inch wire was advanced 
into the SVC. The needle was exchanged for a 4 Spanish micropuncture sheath. The 
micropuncture sheath was exchanged over a 0.035 Bentson wire for a 7 Spanish x 10
cm vascular sheath. Using a 5 Spanish H1 catheter and 0.035 angled Glidewire, the
TIPS shunt was cannulated and catheter manipulated into the main portal vein. A 
portogram was performed and portosystemic pressure measurements were obtained. 
The stenosis within the hepatic venous end of the stent was then angioplastied 
using a 10 mm high-pressure balloon. Post angioplasty portogram was performed 
and repeat pressure measurements obtained. All catheters and wires removed. The 
sheath was removed and hemostasis achieved with manual compression. A sterile 
dressing was applied. The patient tolerated procedure without immediate 
complication. FINDINGS/IMPRESSION: Portogram demonstrates patent TIPS with focal
stenosis towards the the hepatic venous end which was successfully treated using
a 10 mm high-pressure balloon. Repeat portogram demonstrates patency of the TIPS
with no significant residual stenosis.Pressure measurements as follows: Pre-
angioplasty:Main portal vein - 24 mmHgRight atrium - 6 mmHg Post-
angioplasty:Main portal vein - 18 mmHgRight atrium - 8 mmHg Signed: Dheeraj Durán MDReport Verified Date/Time: 2021 17:05:16 Reading Location: [place] [address] Angio Body Reading Room Electronicallysigned by: DHEERAJ DURÁN MD on 
2021 05:05 PMPET/CT, CARDIAC PERF REST AND [id number] 16:10:00
Referring: Dr. Qiana Joshi for exam:-&gt;liver transplant evaluation
************************************************************[facility] 1970 Sex: 
M************************************************************FINAL REPORT 
PATIENT ID: [id number] PROCEDURE: MYOCARDIAL PERFUSION PET IMAGING 
(Rest/Stress)CPT CODE: 26694 INDICATION: Evaluation for CAD prior to liver 
transplant CARDIOVASCULAR PROFILE:CAD History: NoneSymptoms: NoneRisk Factors: 
NoneBMI: 27.2Medications: None STRESS PROTOCOL:Pharmacologic stress wasachieved 
with a 10-second intravenous infusion of regadenoson 0.4 mg. The 
radiopharmaceutical was administered 30 seconds after the start of the 
regadenoson infusion. IMAGING PROTOCOL:Limited low-dose CT imaging was performed
for attenuation correction. 40.1 mCi of Rb-82 chloride was injected intraveno
usly at rest, and gated PET images were obtained. Then, 40.1 mCi of Rb-82 
chloride was injected intravenously at peak stress, and gated PET images were 
obtained. Image quality is good. REST FINDINGS:HR: 85/minBP: 106/50 mmHgPrelim. 
EKG: Normal sinus rhythm.Perfusion: there is a small mild, defect in the apex 
and apical anterior wall.Wall Motion: Normal (LVEF greater than 70%).LV Volume: 
Normal.RV Volume: Normal. STRESS FINDINGS:HR: 96/min (54% of MPHR)BP: 105/52 
mmHgPrelim. EKG: No ischemic changes.Symptoms: None (treatment not 
required).Perfusion: there is a small, moderate severity defect in the apex and 
apical anterior wall.Wall Motion: Normal (LVEF greater than 70%).LV Volume: Not 
significantly changed from rest. IMPRESSION:1. Abnormal study.2. Abnormal 
myocardial perfusion. There is a small size, moderate severity, mostly 
reversible perfusion abnormality in the apex and apical anterior LV.3. Normal 
resting LVEF, which does not deteriorate with pharmacologic stress.4. Normal 
extracardiac tracer distribution.5. There is no prior study for comparison. 
Signed: Anthony Laughlin MDReport Verified Date/Time: 2021 16:10:19 Reading 
Location: [address] Reading Room Electronically signed by: 
ANTHONY LAUGHLIN MD on 2021 04:10 PMALBUMIN PLEURAL [id number] 
14:17:00





             Test Item    Value        Reference Range Interpretation Comments

 

             ALBUMIN, PLEURAL FLUID                                        ALBUM

IN, PLEURAL FLUID =



             (BEAKER) (test code =                                        0.4 g/

dLTEST PERFORMED AT



             1195890)                                            [facility]



PROTEIN, BODY [id number] 14:15:00





             Test Item    Value        Reference Range Interpretation Comments

 

             PROTEIN FLUID (BEAKER)                                        PROTE

IN, PLEURAL = 1.0



             (test code = 579)                                        g/dLTEST P

ERFORMED AT



                                                                 [facility]



Absence of reference range indicates that normals have not been defined.Assay 
performance has not been validated for this type of specimen.RUBELLA ANTIBODY, 
[id number] 12:43:00





             Test Item    Value        Reference Range Interpretation Comments

 

             RUBELLA IGG QUANTITATION (BEAKER) 19.0 IU/mL   <8.0         H      

      



             (test code = 572)                                        



Rubella IgG Result Interpretation: &lt;/= 7.0 IU/mL Negative - Presumed non-
immune 8.0 - 9.9 IU/mL Equivocal &gt;= 10.0 IU/mL Positive - Presumed immune
VARICELLA ZOSTER ANTIBODY, [id number] 12:43:00





             Test Item    Value        Reference Range Interpretation Comments

 

             VARICELLA ZOSTER IGG (AL) (BEAKER) >                               

       



             (test code = 3197)                                        



VARICELLA ZOSTER RESULT INTERPRETATIONS: &lt;=0.8 Al Nonreactive: Presumed non-
immune to VZV 0.9-1.0Al Equivocal &gt;=1.1 Al Reactive: Presumed immune to VZV
U/S, ABDOMINAL, [id number] 12:34:00Referring: Dr. Qiana Liu Labs to
be ordered:-&gt;No Labs Needed Reason for exam:-&gt;therapeutic para
************************************************************[facility] 1970 Sex: 
M************************************************************FINAL REPORT 
PATIENT ID: [id number] History: Ascites. PROCEDURE: Limited sonographic 
examination of theabdomen was performed in preparation for planned ultrasound-
guided paracentesis. Limited sonographicexamination of the abdomen showed only a
trace amount of fluid, primarily in the right lower quadrant. Therefore, 
paracentesis was not performed. IMPRESSION: 1. Trace ascites, not enough for 
planned therapeutic paracentesis. Signed: Dheeraj Durán Verified 
Date/Time: 2021 12:34:52 Reading Location: [address] Ultrasound Reading
Room Electronically signed by: DHEERAJ DURÁN MD on 2021 12:34 PMEBV 
ANTIBODY, [id number] 11:36:00





             Test Item    Value        Reference Range Interpretation Comments

 

             RUPESH PEREZ VIRAL CAPSID Positive     Negative, Equivocal A       

     



             ANTIGEN IGG (BEAKER) (test code                                    

    



             = 3415)                                             



Rupesh Perez Viral Capsid Antigen IgG Result Interpretation: &lt;/= 0.8 Al 
Negative 0.9-1.0 Al Equivocal &gt;/= 1.1 Al [id number] 11:35:00





             Test Item    Value        Reference Range Interpretation Comments

 

             RPR SCREEN (BEAKER) (test code = Nonreactive  Nonreactive          

     



             420)                                                



CRYPTOCOCCAL [id number] 11:35:00





             Test Item    Value        Reference Range Interpretation Comments

 

             CRYPTOCOCCAL ANTIGEN, SERUM Negative     Negative, Interference    

          



             (BEAKER) (test code = 1828)                                        



CYTOMEGALOVIRUS ANTIBODY, [id number] 10:00:00





             Test Item    Value        Reference Range Interpretation Comments

 

             CYTOMEGALOVIRUS, IGG (BEAKER) Positive     Negative, Equivocal A   

         



             (test code = 3429)                                        



CMV IgG Result Interpretation: &lt;/= 0.8 Al Negative 0.9-1.0 Al Equivocal 
&gt;/=1.1 Al PositiveCYTOMEGALOVIRUS ANTIBODY, [id number] 10:00:00





             Test Item    Value        Reference Range Interpretation Comments

 

             CYTOMEGALOVIRUS IGM ANTIBODY Negative     Negative, Equivocal      

        



             (BEAKER) (test code = 3437)                                        



CMV IgM Result Interpretation: &lt;/= 0.8 Al Negative 0.9-1.0 Al Equivocal 
&gt;/= 1.1 Al PositiveEBV ANTIBODY, [id number] 10:00:00





             Test Item    Value        Reference Range Interpretation Comments

 

             RUPESH PEREZ VIRAL CAPSID Negative     Negative, Equivocal         

     



             ANTIGEN IGM (BEAKER) (test code                                    

    



             = 3418)                                             



Rupesh Perez Viral Capsid Antigen IgM Result Interpretation: &lt;/= 0.8 Al 
Negative 0.9-1.0 Al Equivocal &gt;/= 1.1 Al PositiveCOMPREHENSIVE METABOLIC 
[id number] 06:31:00





             Test Item    Value        Reference Range Interpretation Comments

 

             TOTAL PROTEIN 6.0 gm/dL    6.0-8.3                   



             (BEAKER) (test code =                                        



             770)                                                

 

             ALBUMIN (BEAKER) 1.8 g/dL     3.5-5.0      L            



             (test code = 1145)                                        

 

             ALKALINE PHOSPHATASE 89 U/L                           



             (BEAKER) (test code =                                        



             346)                                                

 

             BILIRUBIN TOTAL 7.2 mg/dL    0.2-1.2      H            



             (BEAKER) (test code =                                        



             377)                                                

 

             SODIUM (BEAKER) (test 130 meq/L    136-145      L            



             code = 381)                                         

 

             POTASSIUM (BEAKER) 3.8 meq/L    3.5-5.1                   



             (test code = 379)                                        

 

             CHLORIDE (BEAKER) 102 meq/L                        



             (test code = 382)                                        

 

             CO2 (BEAKER) (test 24 meq/L     22-29                     



             code = 355)                                         

 

             BLOOD UREA NITROGEN 7 mg/dL      7-21                      



             (BEAKER) (test code =                                        



             354)                                                

 

             CREATININE (BEAKER) 0.75 mg/dL   0.57-1.25                 



             (test code = 358)                                        

 

             GLUCOSE RANDOM 83 mg/dL                         



             (BEAKER) (test code =                                        



             652)                                                

 

             CALCIUM (BEAKER) 7.3 mg/dL    8.4-10.2     L            



             (test code = 697)                                        

 

             AST (SGOT) (BEAKER) 90 U/L       5-34         H            



             (test code = 353)                                        

 

             ALT (SGPT) (BEAKER) 49 U/L       6-55                      



             (test code = 347)                                        

 

             EGFR (BEAKER) (test 110                                    ESTIMATE

D GFR IS



             code = 1092) mL/min/1.73 sq                           NOT AS ACCURA

TE AS



                          m                                      CREATININE



                                                                 CLEARANCE IN



                                                                 PREDICTING



                                                                 GLOMERULAR



                                                                 FILTRATION RATE

.



                                                                 ESTIMATED GFR I

S



                                                                 NOT APPLICABLE 

FOR



                                                                 DIALYSIS PATIEN

TS.



 ID - ANGELIQUE WSpecimen moderately ictericPROTHROMBIN TIME/[id number] 
04:57:00





             Test Item    Value        Reference Range Interpretation Comments

 

             PROTIME (BEAKER) (test code = 26.5 seconds 11.9-14.2    H          

  



             759)                                                

 

             INR (BEAKER) (test code = 370) 2.51         <=5.90                 

   



Effective 2019: PT Reference Range ChangeNew: 11.9-14.2 Previous: 11.7-
14.7RECOMMENDED COUMADIN/WARFARIN INR THERAPY RANGESSTANDARD DOSE: 2.0-3.0 
Includes: PROPHYLAXIS for venous thrombosis, systemic embolization; TREATMENT 
for venous thrombosis and/or pulmonary embolus.HIGH RISK: Target INR is 2.5-3.5 
for patients wiht mechanical heart valves.CBC W/PLT COUNT &amp; AUTO 
[id number] 04:45:00





             Test Item    Value        Reference Range Interpretation Comments

 

             WHITE BLOOD CELL COUNT (BEAKER) 5.4 K/ L     3.5-10.5              

    



             (test code = 775)                                        

 

             RED BLOOD CELL COUNT (BEAKER) 2.94 M/ L    4.63-6.08    L          

  



             (test code = 761)                                        

 

             HEMOGLOBIN (BEAKER) (test code = 10.7 GM/DL   13.7-17.5    L       

     



             410)                                                

 

             HEMATOCRIT (BEAKER) (test code = 30.5 %       40.1-51.0    L       

     



             411)                                                

 

             MEAN CORPUSCULAR VOLUME (BEAKER) 103.7 fL     79.0-92.2    H       

     



             (test code = 753)                                        

 

             MEAN CORPUSCULAR HEMOGLOBIN 36.4 pg      25.7-32.2    H            



             (BEAKER) (test code = 751)                                        

 

             MEAN CORPUSCULAR HEMOGLOBIN CONC 35.1 GM/DL   32.3-36.5            

     



             (BEAKER) (test code = 752)                                        

 

             RED CELL DISTRIBUTION WIDTH 14.4 %       11.6-14.4                 



             (BEAKER) (test code = 412)                                        

 

             PLATELET COUNT (BEAKER) (test 112 K/CU MM  150-450      L          

  



             code = 756)                                         

 

             MEAN PLATELET VOLUME (BEAKER) 11.9 fL      9.4-12.4                

  



             (test code = 754)                                        

 

             NUCLEATED RED BLOOD CELLS 0 /100 WBC   0-0                       



             (BEAKER) (test code = 413)                                        

 

             NEUTROPHILS RELATIVE PERCENT 55 %                                  

 



             (BEAKER) (test code = 429)                                        

 

             LYMPHOCYTES RELATIVE PERCENT 21 %                                  

 



             (BEAKER) (test code = 430)                                        

 

             MONOCYTES RELATIVE PERCENT 13 %                                   



             (BEAKER) (test code = 431)                                        

 

             EOSINOPHILS RELATIVE PERCENT 8 %                                   

 



             (BEAKER) (test code = 432)                                        

 

             BASOPHILS RELATIVE PERCENT 1 %                                    



             (BEAKER) (test code = 437)                                        

 

             NEUTROPHILS ABSOLUTE COUNT 3.01 K/ L    1.78-5.38                 



             (BEAKER) (test code = 670)                                        

 

             LYMPHOCYTES ABSOLUTE COUNT 1.15 K/ L    1.32-3.57    L            



             (BEAKER) (test code = 414)                                        

 

             MONOCYTES ABSOLUTE COUNT (BEAKER) 0.73 K/ L    0.30-0.82           

      



             (test code = 415)                                        

 

             EOSINOPHILS ABSOLUTE COUNT 0.45 K/ L    0.04-0.54                 



             (BEAKER) (test code = 416)                                        

 

             BASOPHILS ABSOLUTE COUNT (BEAKER) 0.06 K/ L    0.01-0.08           

      



             (test code = 417)                                        

 

             IMMATURE GRANULOCYTES-RELATIVE 1 %          0-1                    

   



             PERCENT (BEAKER) (test code =                                      

  



             2801)                                               



SARS-COV2/RT-PCR ([facility])2021 21:15:00





             Test Item    Value        Reference Range Interpretation Comments

 

             SARS-COV2/RT-PCR (test Negative     Not Detected, Negative,        

      



             code = 8962854)              See external report for              



                                       linked test               

 

             SARS-COV-2 PERFORMING LAB [facility]AS                             



             (test code = 2749800)                                        



Negative result for this test determines that SARS-CoV-2 RNA was not present in 
the specimen above the Limit of Detection (LOD). However, Negative results do 
not preclude SARS-CoV-2 infection and should not be used as the sole basis for 
treatment or patient management decisions. Negative results must be combined 
with clinical observations, patient history, and epidemiological information. A 
false negative result may occur if a specimen is improperly collected, 
transported or handled. A false negative result should be considered if 
patient's recent exposures or clinical presentation indicate that COVID-19 
(SARS-CoV-2) is likely and diagnostic tests for other causes of illness are 
negative. Re-testing should be considered in cases of suspected false 
negatives.The limit of detection for this assay is 800 copies/mL.This SARS CoV-2
test is a real-time RT-PCR test intended for the qualitative detection of 
nucleic acid from SARS-CoV-2 in a nasopharyngeal swab specimen collected from 
individuals suspected of COVID-19 by their healthcare provider.This test has not
been Food and Drug Administration (FDA) cleared or approved. This is a modified 
version of an approved Emergency Use Authorization (EUA) and is in the process 
of review by the FDA. Once authorized by the FDA, the issued EUA will be 
effective until the declaration that circumstances exist justifying the 
authorization of the emergency use ofin vitro diagnostic tests for detection 
and/or diagnosis of COVID-19 is terminated under Section 564(b)(2) of the Act or
the EUA is revoked under Section 564(g) of the Act.Fact Sheet for Healthcare 
Prov
iders:https://www.[employer].com/sites/default/files/product/documents/Fact_Sheet_HC

_Bchrlebts_Wgqh_OTWF-DjT-0.pdfFact Sheet for Healthcare 
Patients:https://www.[employer].com/sites/default/files/product/docume
nts/Noqr_Ebmxz_Kbqnegdl_Dvuf_RLJO-OkC-4.pdfPerforming Laboratory:[place] [address] [address] [place], [place] [id number] 
20:48:00





             Test Item    Value        Reference Range Interpretation Comments

 

             FERRITIN (BEAKER) (test code = 440.42 ng/mL 5..00  H         

   



             361)                                                



 ID - RHONDA MVITAMIN D, [id number] 19:53:00





             Test Item    Value        Reference Range Interpretation Comments

 

             VITAMIN D 25-OH (BEAKER) (test code = < ng/mL      6.6-49.9     L  

          



             2764)                                               



Effective 10/11/2017: Reference Range ChangeNew: 6.6-49.9 ng/mL Previous: 13.0-
47.8 ng/mLRecommendedVitamin D Target Range: 30.0-40.0 ng/mLOperator ID - FSE
HEPATITIS A ANTIBODY, [id number] 19:53:00





             Test Item    Value        Reference Range Interpretation Comments

 

             HEPATITIS A IGG ANTIBODY (BEAKER) Reactive     Nonreactive  A      

      



             (test code = 2797)                                        



 ID - [id number] 19:47:00





             Test Item    Value        Reference Range Interpretation Comments

 

             PROSTATE SPECIFIC ANTIGEN (BEAKER) 0.4 ng/mL    0.0-4.0            

       



             (test code = 844)                                        



 ID - FSECT, CHEST, WITHOUT [id number] 19:42:00Referring: Dr. Qiana LiuUnlisted Reason for Exam - Click Yes and Enter Reason 
Below-&gt;YesUnlisted Reason for Exam-&gt;liver transplant evaluation
************************************************************[facility] : 1970 Sex: 
M************************************************************FINAL REPORT 
PATIENT ID: [id number] TECHNIQUE: CT scan of the chest WITHOUT intravenous 
contrast. Dose modulation, iterative reconstruction, and/or weight-based 
adjustment of the mA/kV was utilized to reduce the radiation dose to as low as 
reasonably achievable. INDICATION: Unlisted Reason for Examliver transplant 
evaluation. COMPARISON: 2016 CT abdomen/pelvis. FINDINGS: ABSENCE OF 
INTRAVENOUS CONTRAST DECREASES SENSITIVITY FOR DETECTION OF FOCAL LESIONS AND 
VASCULAR PATHOLOGY. LINES/TUBES: None. LUNGS AND AIRWAYS: No consolidation or 
suspicious lesion. Tracheobronchial airways are clear. PLEURA:The pleural spaces
are clear. HEART AND MEDIASTINUM: The visualized thyroid gland is normal. No 
mediastinal, hilar, or axillary lymphadenopathy. Heart is normal size. No 
pericardial effusion or aneurysmal dilatation. SOFT TISSUES AND BONES: 
Unremarkable. UPPER ABDOMEN: Cirrhotic liver with TIPS. Moderate volume of 
hypoattenuating ascites and partially imaged spleen is enlarged. Gallstones 
present within the gallbladder IMPRESSION:1. No evidence of an acute or active 
intrathoracic abnormality. 2. 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 
hypertension. 3. Cholelithiasis. Signed: [facility] Verified 
Date/Time: 2021 19:42:49 Electronically signed by: FRANCO RODARTE MD on 
2021 07:42 PMHEPATITIS B SURFACE [id number] 19:36:00





             Test Item    Value        Reference Range Interpretation Comments

 

             HEPATITIS B SURFACE ANTIBODY < mIU/mL     <8.0                     

 



             (BEAKER) (test code = 647)                                        



 ID - FSECARCINOEMBRYONIC ANTIGEN (CEA)2021 19:36:00





             Test Item    Value        Reference Range Interpretation Comments

 

             CARCINOEMBRYONIC ANTIGEN (BEAKER) 4.1 ng/mL    0.0-5.0             

      



             (test code = 685)                                        



 ID - FSEHEPATITIS B CORE ANTIBODY, [id number] 19:36:00





             Test Item    Value        Reference Range Interpretation Comments

 

             HEPATITIS B CORE IGM ANTIBODY Nonreactive  Nonreactive             

  



             (BEAKER) (test code = 645)                                        



 ID - FSEHEPATITIS A ANTIBODY, [id number] 19:36:00





             Test Item    Value        Reference Range Interpretation Comments

 

             HEPATITIS A IGM ANTIBODY (BEAKER) Nonreactive  Nonreactive         

      



             (test code = 498)                                        



 ID - FSEURINALYSIS W/ REFLEX URINE [id number] 19:34:00





             Test Item    Value        Reference Range Interpretation Comments

 

             COLOR (BEAKER) (test code = 470) Brown                             

     

 

             CLARITY (BEAKER) (test code = 469) Clear                           

       

 

             SPECIFIC GRAVITY UA (BEAKER) (test 1.036        1.001-1.035  H     

       



             code = 468)                                         

 

             PH UA (BEAKER) (test code = 467) 6.0          5.0-8.0              

     

 

             PROTEIN UA (BEAKER) (test code = 20 mg/dL     Negative     A       

     



             464)                                                

 

             GLUCOSE UA (BEAKER) (test code = Negative     Negative             

     



             365)                                                

 

             KETONES UA (BEAKER) (test code = Negative     Negative             

     



             371)                                                

 

             BILIRUBIN UA (BEAKER) (test code = Positive     Negative     A     

       



             462)                                                

 

             BLOOD UA (BEAKER) (test code = 461) Negative     Negative          

        

 

             NITRITE UA (BEAKER) (test code = Negative     Negative             

     



             465)                                                

 

             LEUKOCYTE ESTERASE UA (BEAKER) (test Negative     Negative         

         



             code = 466)                                         

 

             UROBILINOGEN UA (BEAKER) (test code > mg/dL      0.2-1.0      H    

        



             = 463)                                              

 

             RBC UA (BEAKER) (test code = 519) 0 /HPF                           

      

 

             WBC UA (BEAKER) (test code = 520) 3 /HPF                           

      

 

             MUCUS (BEAKER) (test code = 1574) Many                             

      

 

             SOURCE(BEAKER) (test code = 2791)                                  

      



 ID - [auto] ID - techHEPATITIS B SURFACE [id number] 
19:34:00





             Test Item    Value        Reference Range Interpretation Comments

 

             HEPATITIS B SURFACE ANTIGEN (2) Nonreactive  Nonreactive           

    



             (BEAKER) (test code = 2585)                                        



Specimen is considered negative for HBsAg.HEPATITIS C [id number] 
19:34:00





             Test Item    Value        Reference Range Interpretation Comments

 

             HEPATITIS C ANTIBODY (BEAKER) Nonreactive  Nonreactive             

  



             (test code = 367)                                        



 ID - FSEHEPATITIS B CORE ANTIBODY, [id number] 19:34:00





             Test Item    Value        Reference Range Interpretation Comments

 

             HEPATITIS B CORE TOTAL ANTIBODY Nonreactive  Nonreactive           

    



             (BEAKER) (test code = 497)                                        



 ID - FSEHIV-1 ANTIGEN WITH HIV-1/2 [id number] 19:34:00





             Test Item    Value        Reference Range Interpretation Comments

 

             HIV-1 ANTIGEN WITH HIV 1\T\2 Nonreactive  Nonreactive              

 



             ANTIBODY (2) (BEAKER) (test code                                   

     



             = 2586)                                             



 ID - FSEHEMOGLOBIN [id number] 19:32:00





             Test Item    Value        Reference Range Interpretation Comments

 

             HEMOGLOBIN A1C (BEAKER) (test code = < %          4.3-6.1      L   

         



             368)                                                



MR, ABDOMEN, [id number] 18:32:00Referring: Dr. Qiana LiuUnlisted 
Reason for Exam - Click Yes and Enter Reason Below-&gt;YesUnlisted Reason for 
Exam-&gt;liver transplant evaluation
************************************************************[facility] 1970 Sex: 
M************************************************************FINAL REPORT 
PATIENT ID: [id number] MR, ABDOMEN, WITH \T\ WITHOUT CONTRAST HISTORY: Unlisted 
Reason forExamliver transplant evaluation COMPARISON: Abdominal MRI 2017 
TECHNIQUE: MRI of the abdomen was performed with and without gadolinium. 
Multiplanar, multisequence images were obtained before and following intravenous
 injection of intravenous gadolinium contrast. FINDINGS: Hepatobiliary 
Findings:Contour and signal intensity: Diffusely nodular and heterogeneous. 
Focal treated observations: None. Focal observations satisfying imaging criteria
 for HCC (LI-RADS 5): None. Focal observations at leastmildly suspicious for HCC
 (LI-RADS 4 or 3): None. Other focal observations (LI-RADS 2 or 1):Small focus 
of susceptibility artifact adjacent to the TIPS (postcontrast series image 65), 
benign but of uncertain etiology Portal vein: Patent, including a patent TIPS. 
Dilated main portal vein, 20 mm. Suggestion of mild to moderate stenosis at the 
distal TIPS near the hepatic veinArterial anatomy: Conventional.Gallbladder and 
bile ducts: Gallstones. No biliary ductal dilation or any filling defects in the
 common bile duct. Nonspecific mild apparent gallbladder wall thickening, 
underdistended gallbladder.Spleen: Markedly enlarged, 17.1 cm in long axis. A 
few punctate nonenhancing foci spleen, too small tocharacterize but 
statistically most likely benign.Varices: Small varices inferior to the spleen. 
Small varices in the abdominal wall.Ascites: Moderate ascites. Additional 
Findings:Lung bases: Trace bilateral pleural effusions.Pancreas: 
Unremarkable.Adrenals: Unremarkable.Kidneys and ureters: Unremarkable.Bowel: No 
evidence for bowel obstruction. Lymph nodes: No lymphadenopathy. A 1.1 cm short 
axis lymph node in the upper retroperitoneum adjacent to the pancreatic body, 
unchanged and likely reactiveVessels: Unremarkable.Abdominal wall: 
Unremarkable.Bones: Unremarkable. IMPRESSION: Cirrhosis with sequelae of portal 
hypertension including marked splenomegaly, moderate ascites, a few small 
varices as above, and no suspicious liver lesions. TIPS, suggestion of mild to 
moderate stenosis at the distal TIPS near the hepatic vein, otherwise patent. 
Cholelithiasis. No biliary ductal dilation or filling defects. The gallbladder 
is contracted. Gallbladder wall thickening, nonspecific in the setting of under
distention and chronic liver disease. Signed: Sarina Gamboa Verified 
Date/Time: 2021 18:32:22 Reading Location: [address] Body Reading 
Room Electronically signed by: SARINA GAMBOA MD on 2021 06:32 PM
VITAMIN B12 AND [id number] 18:17:00





             Test Item    Value        Reference Range Interpretation Comments

 

             VITAMIN B12 (BEAKER) (test code = 1215 pg/mL   213-816      H      

      



             774)                                                

 

             FOLATE (BEAKER) (test code = 362) 8.40 ng/mL   >=7.00              

      



 ID - RHONDA [id number] 18:02:00





             Test Item    Value        Reference Range Interpretation Comments

 

             T4 TOTAL (BEAKER) (test code = 895) 5.9 ug/dL    4.9-11.7          

        



 ID - RHONDA [id number] 18:02:00





             Test Item    Value        Reference Range Interpretation Comments

 

             THYROID STIMULATING HORMONE 1.687 uIU/mL 0.350-4.940               



             (BEAKER) (test code = 772)                                        



 ID - RHONDA MCALCIUM, [id number] 17:42:00





             Test Item    Value        Reference Range Interpretation Comments

 

             CALCIUM IONIZED (BEAKER) (test 1.08 mmol/L  1.12-1.27    L         

   



             code = 698)                                         

 

             PH, BLOOD (BEAKER) (test code = 7.45                               

    



             1810)                                               



[id number] 17:41:00





             Test Item    Value        Reference Range Interpretation Comments

 

             TRANSFERRIN (BEAKER) (test code = 106 mg/dL    174-382      L      

      



             541)                                                



 ID - RHONDA MSpecimen moderately ictericIRON, TIBC, % SAT. (WITHOUT 
FERRITIN)2021 17:41:00





             Test Item    Value        Reference Range Interpretation Comments

 

             IRON (BEAKER) (test code = 547) 126.0 ug/dL  40.0-160.0            

    

 

             TOTAL IRON BINDING CAPACITY 133 ug/dL    250-450      L            



             (BEAKER) (test code = 769)                                        

 

             IRON % SATURATION (2) (BEAKER) 95 %         20-55        H         

   



             (test code = 2590)                                        



 ID - RHONDA [id number] 17:39:00





             Test Item    Value        Reference Range Interpretation Comments

 

             ETHANOL (BEAKER) (test code = 400) < mg/dL      <=10               

       



 ID - RHONDA [id number] 17:32:00





             Test Item    Value        Reference Range Interpretation Comments

 

             FIBRINOGEN LEVEL (BEAKER) (test 154 mg/dl    225-434      L        

    



             code = 658)                                         



[id number] 17:26:00





             Test Item    Value        Reference Range Interpretation Comments

 

             PARTIAL THROMBOPLASTIN TIME 42.0 seconds 22.5-36.0    H            



             (BEAKER) (test code = 760)                                        



BLOOD GAS, [id number] 16:01:00





             Test Item    Value        Reference Range Interpretation Comments

 

             PH ARTERIAL (BEAKER) (test code = 7.51         7.35-7.45    H      

      



             383)                                                

 

             PCO2 ARTERIAL (BEAKER) (test code 32 mm Hg     35-45        L      

      



             = 384)                                              

 

             PO2 ARTERIAL (BEAKER) (test code = 74 mm Hg     80-90        L     

       



             385)                                                

 

             O2 SATURATION ARTERIAL (BEAKER) 96.2 %       96.0-97.0             

    



             (test code = 386)                                        

 

             HCO3 ARTERIAL (BEAKER) (test code 25 mmol/L    21-29               

      



             = 388)                                              

 

             BASE EXCESS ARTERIAL (BEAKER) 2.2 mmol/L   -2.0-3.0                

  



             (test code = 387)                                        

 

             PATIENT TEMPERATURE (BEAKER) (test 37.0                            

       



             code = 1818)                                        

 

             FIO2 (BEAKER) (test code = 1819) 21.0                              

     



RAD, MANDIBLE, MIN 4 [id number] 13:58:00Referring: Dr. Qiana Joshi for exam:-&gt;liver transplant evaluation
************************************************************[facility] 1970  Sex: 
M************************************************************FINAL REPORT 
PATIENT ID: [id number] RAD, MANDIBLE, MIN 4 VIEWS INDICATION: liver transplant 
evaluationCOMPARISON: None TECHNIQUE: AP and lateral radiographs of the mandible
 FINDINGS/IMPRESSION:Multiple dental fillings. Periapical lucencies. Signed: 
Mela Cotto Verified Date/Time: 2021 13:58:03 Reading 
Location: [facility] Radiology Reading Room Electronically signed by: MELA COTTO MD on 2021 01:58 PMRAD, CHEST, 2 [id number] 
13:56:00Referring: Dr. Qiana Joshi for exam:-&gt;liver transplant 
evaluation************************************************************[facility] : 1970 Sex: 
M************************************************************FINAL REPORT 
PATIENT ID: [id number] INDICATION: liver transplant evaluation COMPARISON: None 
TECHNIQUE:Frontal and lateral views of the chest. FINDINGS: Lungs and pleura: 
Clear lungs. No effusion.Heart and mediastinum: Normal heart size. Unremarkable 
mediastinal contours.Osseous structures: No acute abnormality.Additional 
findings: None. IMPRESSION: No acute intrathoracic abnormality. Signed: 
Mela Cotto Verified Date/Time: 2021 13:56:08 Reading 
[facility] Electronically signed by: MELA COTTO MD on 2021 01:56 PMLIPID [id number] 13:19:00





             Test Item    Value        Reference Range Interpretation Comments

 

             TRIGLYCERIDES (BEAKER) (test code = 42 mg/dL                       

        



             540)                                                

 

             CHOLESTEROL (BEAKER) (test code = 40 mg/dL                         

      



             631)                                                

 

             HDL CHOLESTEROL (BEAKER) (test code 9 mg/dL                        

        



             = 976)                                              

 

             LDL CHOLESTEROL CALCULATED (BEAKER) 23 mg/dL                       

        



             (test code = 633)                                        



Triglyceride Reference Range: Low Risk &lt;150 Borderline 150-199 High Risk 200-
499 Very High Risk &gt;=500Cholesterol Reference Range: Low Risk &lt;200 
Borderline 200-239 High Risk &gt;240HDL Cholesterol Reference Range: Low Risk 
&gt;=60 High Risk &lt;40LDL Cholesterol Reference Range: Optimal &lt;100 Near 
Optimal 100-129 Borderline 130-159 High 160-189 Very High &gt;=190  ID -
 RHONDA MSpecimen moderately [id number] 13:09:00





             Test Item    Value        Reference Range Interpretation Comments

 

             MAGNESIUM (BEAKER) (test code = 1.9 mg/dL    1.6-2.6               

    



             627)                                                



 ID - RHONDA MURIC [id number] 13:09:00





             Test Item    Value        Reference Range Interpretation Comments

 

             URIC ACID (BEAKER) (test code = 4.0 mg/dL    2.6-7.2               

    



             773)                                                



 ID - RHONDA MSpecimen moderately [id number] 13:09:00





             Test Item    Value        Reference Range Interpretation Comments

 

             PHOSPHORUS (BEAKER) (test code = 3.4 mg/dL    2.3-4.7              

     



             604)                                                



 ID - RHONDA MGAMMA GLUTAMYL TRANSFERASE (GGT)2021 13:09:00





             Test Item    Value        Reference Range Interpretation Comments

 

             GAMMA GLUTAMYL TRANSFERASE (BEAKER) 65 U/L       9-64         H    

        



             (test code = 364)                                        



 ID - RHONDA MSpecimen moderately ictericU/S, [id number] 
09:40:00Referring: Dr. Qiana AikenenderSend ascitic fluid for cell count and 
differential If Cr &gt; 1.5, do not remove more than 3.5L of ascitic fluid. If 
&gt; 3L removed, please administer 200 mL of albumin 25% (50 grams) IV x 1Labs 
to be ordered:-&gt;Cell CountReason for Exam:-&gt;ascites, send fluid for cell 
count with differential
************************************************************[facility] 1970 Sex: 
M************************************************************FINAL REPORT 
PATIENT ID: [id number] Ultrasound guided paracentesis. Clinical History: Ascites. 
Sedation: None. : Denisa Tellez PA-C Assistant: None. Estimated
 Blood Loss: &lt; 1 cc. Specimen: 2800 cc of clear yellow fluid, samples sent to
 laboratory. Technique: Informed consent was obtained. The risks of pain, 
bleeding, infection, bowel perforation, injury to adjacent structures, and adv
erse medication reactions were discussed with the patient. After informed 
consent was obtained, the patient's abdomen was scanned. The right lower 
quadrant of the abdomen was selected for paracentesis.After the largest fluid 
pocket area was marked, and the anterior abdominal wall was evaluated with color
 Doppler to exclude presence of blood vessels traversing the area, the skin was 
prepped and draped in the usual sterile manner. After local anesthesia was 
achieved with 2% lidocaine, a 5 Spanish one-step catheter was advanced into the 
peritoneal cavity under ultrasound guidance. After completion of drainage, the 
catheter was removed. There was no evidence of complication. 
Impression:Successful ultrasound guided paracentesis. Signed: Dejah Fitzgerald Verified Date/Time: 2021 09:40:17 Reading Location: [facility] P006J 
Ultrasound Reading Room Electronically signed by: DEJAH FITZGERALD M.D. on 
2021 09:40 AMCOMPREHENSIVE METABOLIC [id number] 06:17:00





             Test Item    Value        Reference Range Interpretation Comments

 

             TOTAL PROTEIN 6.0 gm/dL    6.0-8.3                   



             (BEAKER) (test code =                                        



             770)                                                

 

             ALBUMIN (BEAKER) 1.8 g/dL     3.5-5.0      L            



             (test code = 1145)                                        

 

             ALKALINE PHOSPHATASE 86 U/L                           



             (BEAKER) (test code =                                        



             346)                                                

 

             BILIRUBIN TOTAL 7.2 mg/dL    0.2-1.2      H            



             (BEAKER) (test code =                                        



             377)                                                

 

             SODIUM (BEAKER) (test 130 meq/L    136-145      L            



             code = 381)                                         

 

             POTASSIUM (BEAKER) 3.7 meq/L    3.5-5.1                   



             (test code = 379)                                        

 

             CHLORIDE (BEAKER) 102 meq/L                        



             (test code = 382)                                        

 

             CO2 (BEAKER) (test 24 meq/L     22-29                     



             code = 355)                                         

 

             BLOOD UREA NITROGEN 6 mg/dL      7-21         L            



             (BEAKER) (test code =                                        



             354)                                                

 

             CREATININE (BEAKER) 0.76 mg/dL   0.57-1.25                 



             (test code = 358)                                        

 

             GLUCOSE RANDOM 90 mg/dL                         



             (BEAKER) (test code =                                        



             652)                                                

 

             CALCIUM (BEAKER) 7.3 mg/dL    8.4-10.2     L            



             (test code = 697)                                        

 

             AST (SGOT) (BEAKER) 100 U/L      5-34         H            



             (test code = 353)                                        

 

             ALT (SGPT) (BEAKER) 54 U/L       6-55                      



             (test code = 347)                                        

 

             EGFR (BEAKER) (test 108                                    ESTIMATE

D GFR IS



             code = 1092) mL/min/1.73 sq                           NOT AS ACCURA

TE AS



                          m                                      CREATININE



                                                                 CLEARANCE IN



                                                                 PREDICTING



                                                                 GLOMERULAR



                                                                 FILTRATION RATE

.



                                                                 ESTIMATED GFR I

S



                                                                 NOT APPLICABLE 

FOR



                                                                 DIALYSIS PATIEN

TS.



 ID - RHONDA MSpecimen moderately ictericCBC W/PLT COUNT &amp; AUTO 
[id number] 05:25:00





             Test Item    Value        Reference Range Interpretation Comments

 

             WHITE BLOOD CELL COUNT (BEAKER) 5.1 K/ L     3.5-10.5              

    



             (test code = 775)                                        

 

             RED BLOOD CELL COUNT (BEAKER) 2.98 M/ L    4.63-6.08    L          

  



             (test code = 761)                                        

 

             HEMOGLOBIN (BEAKER) (test code = 10.6 GM/DL   13.7-17.5    L       

     



             410)                                                

 

             HEMATOCRIT (BEAKER) (test code = 31.3 %       40.1-51.0    L       

     



             411)                                                

 

             MEAN CORPUSCULAR VOLUME (BEAKER) 105.0 fL     79.0-92.2    H       

     



             (test code = 753)                                        

 

             MEAN CORPUSCULAR HEMOGLOBIN 35.6 pg      25.7-32.2    H            



             (BEAKER) (test code = 751)                                        

 

             MEAN CORPUSCULAR HEMOGLOBIN CONC 33.9 GM/DL   32.3-36.5            

     



             (BEAKER) (test code = 752)                                        

 

             RED CELL DISTRIBUTION WIDTH 14.5 %       11.6-14.4    H            



             (BEAKER) (test code = 412)                                        

 

             PLATELET COUNT (BEAKER) (test 114 K/CU MM  150-450      L          

  



             code = 756)                                         

 

             MEAN PLATELET VOLUME (BEAKER) 11.9 fL      9.4-12.4                

  



             (test code = 754)                                        

 

             NUCLEATED RED BLOOD CELLS 0 /100 WBC   0-0                       



             (BEAKER) (test code = 413)                                        

 

             NEUTROPHILS RELATIVE PERCENT 55 %                                  

 



             (BEAKER) (test code = 429)                                        

 

             LYMPHOCYTES RELATIVE PERCENT 20 %                                  

 



             (BEAKER) (test code = 430)                                        

 

             MONOCYTES RELATIVE PERCENT 16 %                                   



             (BEAKER) (test code = 431)                                        

 

             EOSINOPHILS RELATIVE PERCENT 8 %                                   

 



             (BEAKER) (test code = 432)                                        

 

             BASOPHILS RELATIVE PERCENT 1 %                                    



             (BEAKER) (test code = 437)                                        

 

             NEUTROPHILS ABSOLUTE COUNT 2.80 K/ L    1.78-5.38                 



             (BEAKER) (test code = 670)                                        

 

             LYMPHOCYTES ABSOLUTE COUNT 1.00 K/ L    1.32-3.57    L            



             (BEAKER) (test code = 414)                                        

 

             MONOCYTES ABSOLUTE COUNT (BEAKER) 0.80 K/ L    0.30-0.82           

      



             (test code = 415)                                        

 

             EOSINOPHILS ABSOLUTE COUNT 0.40 K/ L    0.04-0.54                 



             (BEAKER) (test code = 416)                                        

 

             BASOPHILS ABSOLUTE COUNT (BEAKER) 0.07 K/ L    0.01-0.08           

      



             (test code = 417)                                        

 

             IMMATURE GRANULOCYTES-RELATIVE 1 %          0-1                    

   



             PERCENT (BEAKER) (test code =                                      

  



             2801)                                               



PROTHROMBIN [id number] 05:08:00





             Test Item    Value        Reference Range Interpretation Comments

 

             PROTIME (MONALISA) (test code = 25.0 seconds 11.9-14.2    H          

  



             759)                                                

 

             INR (MONALISA) (test code = 370) 2.33         <=5.90                 

   



Effective 2019: PT Reference Range ChangeNew: 11.9-14.2 Previous: 11.7-
14.7RECOMMENDED COUMADIN/WARFARIN INR THERAPY RANGESSTANDARD DOSE: 2.0-3.0 
Includes: PROPHYLAXIS for venous thrombosis, systemic embolization; TREATMENT 
for venous thrombosis and/or pulmonary embolus.HIGH RISK: Target INR is 2.5-3.5 
for patients wiht mechanical heart valves.U/S, ABDOMINAL, WITH [id number]
 03:09:00Referring: Dr. Qiana Evans limited area? Add comment if 
clarification is needed.-&gt;LiverReason for exam:-&gt;Cirrhosis, Assess TIPS
************************************************************[facility] : 1970 Sex: 
M************************************************************FINAL REPORT 
PATIENT ID: [id number] U/S, ABDOMINAL, WITH DOPPLER CLINICAL INDICATION: 
Cirrhosis, AssessTIPS COMPARISON: None TECHNIQUE: Abdominal ultrasound was 
performed with Doppler. FINDINGS:Liver: 13.7 cm in length at the right 
midclavicular line. Cirrhotic morphology. No lesion is identified by ultrasound.
 Main portal vein is patent measuring 0.8 cm in diameter and demonstrates 
hepatopetal flow. ATIPS stent is present. Biliary tree: Common duct 3 mm. No 
intrahepatic biliary ductal dilatation. Gallbladder: No shadowing 
calculus/calculi. No wall thickening. No pericholecystic fluid. Negative sonog
raphic Talavera's sign. Pancreas: Largely obscured by bowel gas but the visualized
 portions of the head are grossly unremarkable. Spleen: 14.2 cm in length. 
Ascites: Small perihepatic ascites. Kidneys: Right kidney measures 11 x 6.2 x 
5.4 cm with cortical thickness of 2 cm. Left kidney measures 11.3 x 6.8 x 5.5 cm
 with cortical thickness of 1.8 cm. Normal cortical echogenicity. No mass. No 
shadowing calculus. No hydronephrosis. IVC/Aorta: Segments partially seen. 
Unremarkable. Color and spectral Doppler evaluation of the hepatic vasculature: 
The main portal vein and branches are patent and demonstrate hepatofugal flow. 
The peak systolic velocity in the main portal vein is 19.4 cm/s. The proper, 
right and left hepatic arteries are patent with normal waveforms. The resistive 
indices in the proper, right and left hepatic arteries are 0.7, 0.7 and 0.7 
respectively. The hepatic confluence, main hepaticvein and branches are patent 
with normal waveforms. The splenic vein at the pancreatic head and tailwere not 
seen due to obscuration by bowel gas. The splenic artery and vein at the splenic
 hilum are patent with normal waveforms. The peak systolic velocities in the 
proximal, mid and distal TIPS stentof 30.4 cm/s, 53.9 cm/s and 98.5 cm/s 
respectively. IMPRESSION:Decreased velocities in the proximal and mid TIPS stent
 as well as in the main portal vein. 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 
Venography may be performed for further evaluation as clinically warranted. 
Hepatofugal flow in the mainportal vein in keeping with portal hypertension. 
Cirrhotic liver. Small perihepatic ascites. Splenomegaly. Signed: Marisabel Gandara 
MDReport Verified Date/Time: 2021 03:09:45 Electronically signed by: 
MARISABEL GANDARA MD on 2021 03:09 AMBODY FLUID CELL COUNT WITH 
[id number] 13:51:00





             Test Item    Value        Reference Range Interpretation Comments

 

             APPEARANCE FLUID (BEAKER) (test Hazy         Clear        A        

    



             code = 510)                                         

 

             COLOR FLUID (BEAKER) (test code Yellow       Colorless, Straw A    

        



             = 511)                                              

 

             RBC FLUID (BEAKER) (test code = 620 /cu mm   <=1          H        

    



             513)                                                

 

             ADJUSTED WBC FLUID (BEAKER) 279 /cu mm   <=5          H            



             (test code = 1691)                                        

 

             LINING CELLS (BEAKER) (test 34 /cu mm    <=1          H            



             code = 1590)                                        

 

             NEUTROPHILS FLUID (BEAKER) 12 %                                   



             (test code = 1656)                                        

 

             LYMPHS FLUID (BEAKER) (test 33 %                                   



             code = 488)                                         

 

             MONO/MACROPHAGE FLUID (BEAKER) 55 %                                

   



             (test code = 489)                                        

 

             EOSINOPHILS FLUID (BEAKER) 0 %                                    



             (test code = 491)                                        

 

             BASO FLUID (BEAKER) (test code 0 %                                 

   



             = 492)                                              

 

             CONTAINER BODY FLUID (BEAKER) Sterile Vial                         

  



             (test code = 2873)                                        



ALPHA FETOPROTEIN (AFP), TUMOR [id number] 10:37:00





             Test Item    Value        Reference Range Interpretation Comments

 

             ALPHA-FETOPROTEIN (BEAKER) (test 4.2 ng/mL    <10.0                

     



             code = 1094)                                        



 ID Dre TOPETE FHEPATIC FUNCTION [id number] 10:28:00





             Test Item    Value        Reference Range Interpretation Comments

 

             TOTAL PROTEIN (BEAKER) (test code = 7.5 gm/dL    6.0-8.3           

        



             770)                                                

 

             ALBUMIN (BEAKER) (test code = 1145) 2.3 g/dL     3.5-5.0      L    

        

 

             BILIRUBIN TOTAL (BEAKER) (test code 9.1 mg/dL    0.2-1.2      H    

        



             = 377)                                              

 

             BILIRUBIN DIRECT (BEAKER) (test 4.2 mg/dL    0.1-0.5      H        

    



             code = 706)                                         

 

             ALKALINE PHOSPHATASE (BEAKER) (test 115 U/L                  

        



             code = 346)                                         

 

             AST (SGOT) (BEAKER) (test code = 141 U/L      5-34         H       

     



             353)                                                

 

             ALT (SGPT) (BEAKER) (test code = 71 U/L       6-55         H       

     



             347)                                                



 ID Dre EFREM FSpecimen moderately ictericBASIC METABOLIC PANEL
2021 10:28:00





             Test Item    Value        Reference Range Interpretation Comments

 

             SODIUM (BEAKER) 130 meq/L    136-145      L            



             (test code = 381)                                        

 

             POTASSIUM (BEAKER) 4.1 meq/L    3.5-5.1                   



             (test code = 379)                                        

 

             CHLORIDE (BEAKER) 100 meq/L                        



             (test code = 382)                                        

 

             CO2 (BEAKER) (test 26 meq/L     22-29                     



             code = 355)                                         

 

             BLOOD UREA NITROGEN 7 mg/dL      7-21                      



             (BEAKER) (test code                                        



             = 354)                                              

 

             CREATININE (BEAKER) 0.83 mg/dL   0.57-1.25                 



             (test code = 358)                                        

 

             GLUCOSE RANDOM 101 mg/dL                        



             (BEAKER) (test code                                        



             = 652)                                              

 

             CALCIUM (BEAKER) 7.7 mg/dL    8.4-10.2     L            



             (test code = 697)                                        

 

             EGFR (BEAKER) (test 98 mL/min/1.73                           ESTIMA

SUSI GFR IS



             code = 1092) sq m                                   NOT AS ACCURATE

 AS



                                                                 CREATININE



                                                                 CLEARANCE IN



                                                                 PREDICTING



                                                                 GLOMERULAR



                                                                 FILTRATION RATE

.



                                                                 ESTIMATED GFR I

S



                                                                 NOT APPLICABLE 

FOR



                                                                 DIALYSIS PATIEN

TS.



 ID - EFREM FSpecimen moderately ictericPROTHROMBIN TIME/INR
2021 09:50:00





             Test Item    Value        Reference Range Interpretation Comments

 

             PROTIME (BEAKER) (test code = 22.7 seconds 11.9-14.2    H          

  



             759)                                                

 

             INR (BEAKER) (test code = 370) 2.08         <=5.90                 

   



Effective 2019: PT Reference Range ChangeNew: 11.9-14.2 Previous: 11.7-
14.7RECOMMENDED COUMADIN/WARFARIN INR THERAPY RANGESSTANDARD DOSE: 2.0-3.0 
Includes: PROPHYLAXIS for venous thrombosis, systemic embolization; TREATMENT 
for venous thrombosis and/or pulmonary embolus.HIGH RISK: Target INR is 2.5-3.5 
for patients wiht mechanical heart valves.CBC W/PLT COUNT &amp; AUTO 
[id number] 09:30:00





             Test Item    Value        Reference Range Interpretation Comments

 

             WHITE BLOOD CELL COUNT (BEAKER) 6.0 K/ L     3.5-10.5              

    



             (test code = 775)                                        

 

             RED BLOOD CELL COUNT (BEAKER) 3.63 M/ L    4.63-6.08    L          

  



             (test code = 761)                                        

 

             HEMOGLOBIN (BEAKER) (test code = 13.2 GM/DL   13.7-17.5    L       

     



             410)                                                

 

             HEMATOCRIT (BEAKER) (test code = 38.8 %       40.1-51.0    L       

     



             411)                                                

 

             MEAN CORPUSCULAR VOLUME (BEAKER) 106.9 fL     79.0-92.2    H       

     



             (test code = 753)                                        

 

             MEAN CORPUSCULAR HEMOGLOBIN 36.4 pg      25.7-32.2    H            



             (BEAKER) (test code = 751)                                        

 

             MEAN CORPUSCULAR HEMOGLOBIN CONC 34.0 GM/DL   32.3-36.5            

     



             (BEAKER) (test code = 752)                                        

 

             RED CELL DISTRIBUTION WIDTH 14.6 %       11.6-14.4    H            



             (BEAKER) (test code = 412)                                        

 

             PLATELET COUNT (BEAKER) (test 141 K/CU MM  150-450      L          

  



             code = 756)                                         

 

             MEAN PLATELET VOLUME (BEAKER) 11.4 fL      9.4-12.4                

  



             (test code = 754)                                        

 

             NUCLEATED RED BLOOD CELLS 0 /100 WBC   0-0                       



             (BEAKER) (test code = 413)                                        

 

             NEUTROPHILS RELATIVE PERCENT 53 %                                  

 



             (BEAKER) (test code = 429)                                        

 

             LYMPHOCYTES RELATIVE PERCENT 23 %                                  

 



             (BEAKER) (test code = 430)                                        

 

             MONOCYTES RELATIVE PERCENT 15 %                                   



             (BEAKER) (test code = 431)                                        

 

             EOSINOPHILS RELATIVE PERCENT 7 %                                   

 



             (BEAKER) (test code = 432)                                        

 

             BASOPHILS RELATIVE PERCENT 1 %                                    



             (BEAKER) (test code = 437)                                        

 

             NEUTROPHILS ABSOLUTE COUNT 3.20 K/ L    1.78-5.38                 



             (BEAKER) (test code = 670)                                        

 

             LYMPHOCYTES ABSOLUTE COUNT 1.36 K/ L    1.32-3.57                 



             (BEAKER) (test code = 414)                                        

 

             MONOCYTES ABSOLUTE COUNT (BEAKER) 0.93 K/ L    0.30-0.82    H      

      



             (test code = 415)                                        

 

             EOSINOPHILS ABSOLUTE COUNT 0.40 K/ L    0.04-0.54                 



             (BEAKER) (test code = 416)                                        

 

             BASOPHILS ABSOLUTE COUNT (BEAKER) 0.08 K/ L    0.01-0.08           

      



             (test code = 417)                                        

 

             IMMATURE GRANULOCYTES-RELATIVE 1 %          0-1                    

   



             PERCENT (BEAKER) (test code =                                      

  



             2801)                                               



BLOOD [id number] 17:00:00





             Test Item    Value        Reference Range Interpretation Comments

 

             CULTURE (BEAKER) (test No growth in 5 days                         

  



             code = 1095)                                        



BLOOD [id number] 17:00:00





             Test Item    Value        Reference Range Interpretation Comments

 

             CULTURE (BEAKER) (test No growth in 5 days                         

  



             code = 1095)                                        



COMPREHENSIVE METABOLIC [id number] 07:39:00





             Test Item    Value        Reference Range Interpretation Comments

 

             TOTAL PROTEIN 6.6 gm/dL    6.0-8.3                   Specimen sligh

tly



             (BEAKER) (test code =                                        hemoly

zed



             770)                                                

 

             ALBUMIN (BEAKER) 2.7 g/dL     3.5-5.0      L            Specimen sl

ightly



             (test code = 1145)                                        hemolyzed

 

             ALKALINE PHOSPHATASE 78 U/L                           



             (BEAKER) (test code =                                        



             346)                                                

 

             BILIRUBIN TOTAL 2.6 mg/dL    0.2-1.2      H            Specimen sli

ghtly



             (BEAKER) (test code =                                        hemoly

zed



             377)                                                

 

             SODIUM (BEAKER) (test 134 meq/L    136-145      L            



             code = 381)                                         

 

             POTASSIUM (BEAKER) 4.0 meq/L    3.5-5.1                   Specimen 

slightly



             (test code = 379)                                        hemolyzed

 

             CHLORIDE (BEAKER) 107 meq/L                        



             (test code = 382)                                        

 

             CO2 (BEAKER) (test 21 meq/L     22-29        L            



             code = 355)                                         

 

             BLOOD UREA NITROGEN 9 mg/dL      7-21                      



             (BEAKER) (test code =                                        



             354)                                                

 

             CREATININE (BEAKER) 0.69 mg/dL   0.57-1.25                 Specimen

 slightly



             (test code = 358)                                        hemolyzed

 

             GLUCOSE RANDOM 126 mg/dL           H            



             (BEAKER) (test code =                                        



             652)                                                

 

             CALCIUM (BEAKER) 7.8 mg/dL    8.4-10.2     L            



             (test code = 697)                                        

 

             AST (SGOT) (BEAKER) 52 U/L       5-34         H            Specimen

 slightly



             (test code = 353)                                        hemolyzed

 

             ALT (SGPT) (BEAKER) 24 U/L       6-55                      Specimen

 slightly



             (test code = 347)                                        hemolyzed

 

             EGFR (BEAKER) (test 123                                    ESTIMATE

D GFR IS



             code = 1092) mL/min/1.73 sq                           NOT AS ACCURA

TE AS



                          m                                      CREATININE



                                                                 CLEARANCE IN



                                                                 PREDICTING



                                                                 GLOMERULAR



                                                                 FILTRATION RATE

.



                                                                 ESTIMATED GFR I

S



                                                                 NOT APPLICABLE 

FOR



                                                                 DIALYSIS PATIEN

TS.



Specimen slightly ictericHEMOGLOBIN AND [id number] 06:16:00





             Test Item    Value        Reference Range Interpretation Comments

 

             HEMOGLOBIN (BEAKER) (test code = 8.8 GM/DL    13.7-17.5    L       

     



             410)                                                

 

             HEMATOCRIT (BEAKER) (test code = 28.4 %       40.1-51.0    L       

     



             411)                                                



HEMOGLOBIN AND [id number] 23:04:00





             Test Item    Value        Reference Range Interpretation Comments

 

             HEMOGLOBIN (BEAKER) (test code = 9.6 GM/DL    13.7-17.5    L       

     



             410)                                                

 

             HEMATOCRIT (BEAKER) (test code = 31.6 %       40.1-51.0    L       

     



             411)                                                



ANG, [id number] 17:11:00Referring: Dr. Qiana Joshi for exam:-
&gt;Recurrent GI bleedFINAL REPORT PATIENT ID: [id number] TIPS procedure, 
2017 HISTORY: Variceal bleeding Anesthesia: General Approach: Right 
internal jugular vein Modality: Ultrasound and fluoroscopy, fluoroscopy time:18 
minutes, total dose: 6-0 mGy, reference air kerma method TECHNIQUE: This 
procedure was performed after obtaining written informed consent using all 
elements maximal sterile barrier technique. Ultrasound was used to identify the 
right internal jugular vein was used for access the vein is well. No images were
 saved on PACS. Catheter was advanced in the right hepatic vein and contrast was
 injected confirming patency and appropriate positioning. A coaxial needle 
system was then advanced into the right hepatic vein and directed through the 
liver inferiorly. A vessel was encountered and the sheath partially advanced 
into the vascular system. The vessel entered was a prominent right hepatic 
artery. The sheath was removed from the right hepatic artery and the tract 
between the hepatic artery and the right hepatic vein was embolized with a 
stainless steel coil. A second puncture was then made more medially, entering 
the right portal vein. A guidewire was introduced into the portal vein and 
subsequently in the splenic vein. The tract was initially dilated with a 6 mm 
balloon catheter. An 8 cm in length by 10 mm in diameter Viatorr covered stent 
was deployed and subsequently dilated to 8 mm. Post TIPS placement angiography 
discloses satisfactory appearance with a pet portosystemic gradient of 9 mm. P
ortal vein pressure was 25 mm in the IVC pressure was 16 mm. The catheter was 
removed and neck and hemostasis obtained. CONCLUSION: TIPS placement. Signed: 
Rupa Blanco MDReport Verified Date/Time: 2017 17:11:07 Reading Location:
 [place] [address] Angio Body Reading Room Electronically signed by: RUPA BLANCO M.D. on 2017 05:11 PMURINE [id number] 14:13:00





             Test Item    Value        Reference Range Interpretation Comments

 

             CULTURE (DARBYNATASHA) (test code = 1095) No growth                      

        



CBC W/PLT COUNT &amp; AUTO [id number] 13:29:00





             Test Item    Value        Reference Range Interpretation Comments

 

             WHITE BLOOD CELL COUNT 4.3 K/ L     3.5-10.5                  



             (BEAKER) (test code =                                        



             775)                                                

 

             RED BLOOD CELL COUNT 3.19 M/ L    4.63-6.08    L            



             (BEAKER) (test code =                                        



             761)                                                

 

             HEMOGLOBIN (BEAKER) 7.7 GM/DL    13.7-17.5    L            



             (test code = 410)                                        

 

             HEMATOCRIT (BEAKER) 25.0 %       40.1-51.0    L            



             (test code = 411)                                        

 

             MEAN CORPUSCULAR VOLUME 78.7 fL      79.0-92.2    L            



             (BEAKER) (test code =                                        



             753)                                                

 

             MEAN CORPUSCULAR 24.1 pg      25.7-32.2    L            



             HEMOGLOBIN (BEAKER)                                        



             (test code = 751)                                        

 

             MEAN CORPUSCULAR 30.7 GM/DL   32.3-36.5    L            



             HEMOGLOBIN CONC                                        



             (BEAKER) (test code =                                        



             752)                                                

 

             RED CELL DISTRIBUTION 18.2 %       11.6-14.4    H            



             WIDTH (BEAKER) (test                                        



             code = 412)                                         

 

             PLATELET COUNT (BEAKER) 54 K/CU MM   150-450      L            



             (test code = 756)                                        

 

             MEAN PLATELET VOLUME  fL          9.4-12.4                  Unable 

to report due



             (BEAKER) (test code =                                        to abn

ormal Platelet



             754)                                                population



                                                                 distribution.

 

             NUCLEATED RED BLOOD 0 /100 WBC   0-0                       



             CELLS (BEAKER) (test                                        



             code = 413)                                         

 

             NEUTROPHILS RELATIVE 56 %                                   



             PERCENT (BEAKER) (test                                        



             code = 429)                                         

 

             LYMPHOCYTES RELATIVE 22 %                                   



             PERCENT (BEAKER) (test                                        



             code = 430)                                         

 

             MONOCYTES RELATIVE 11 %                                   



             PERCENT (BEAKER) (test                                        



             code = 431)                                         

 

             EOSINOPHILS RELATIVE 10 %                                   



             PERCENT (BEAKER) (test                                        



             code = 432)                                         

 

             BASOPHILS RELATIVE 1 %                                    



             PERCENT (BEAKER) (test                                        



             code = 437)                                         

 

             NEUTROPHILS ABSOLUTE 2.41 K/ L    1.78-5.38                 



             COUNT (BEAKER) (test                                        



             code = 670)                                         

 

             LYMPHOCYTES ABSOLUTE 0.96 K/ L    1.32-3.57    L            



             COUNT (BEAKER) (test                                        



             code = 414)                                         

 

             MONOCYTES ABSOLUTE 0.47 K/ L    0.30-0.82                 



             COUNT (BEAKER) (test                                        



             code = 415)                                         

 

             EOSINOPHILS ABSOLUTE 0.41 K/ L    0.04-0.54                 



             COUNT (BEAKER) (test                                        



             code = 416)                                         

 

             BASOPHILS ABSOLUTE 0.02 K/ L    0.01-0.08                 



             COUNT (BEAKER) (test                                        



             code = 417)                                         

 

             IMMATURE     0 %          0-1                       



             GRANULOCYTES-RELATIVE                                        



             PERCENT (BEAKER) (test                                        



             code = 2806)                                        



(MANUAL DIFFERENTIAL)2017 13:29:00





             Test Item    Value        Reference Range Interpretation Comments

 

             TOTAL COUNTED (BEAKER) (test code =                                

        



             1351)                                               

 

             WBC MORPHOLOGY (BEAKER) (test code = Normal                        

         



             487)                                                

 

             PLT MORPHOLOGY (BEAKER) (test code = Normal                        

         



             486)                                                

 

             RBC MORPHOLOGY (BEAKER) (test code = Normal                        

         



             762)                                                



PROTHROMBIN [id number] 09:45:00





             Test Item    Value        Reference Range Interpretation Comments

 

             PROTIME (BEAKER) (test code = 17.3 seconds 11.7-14.7    H          

  



             759)                                                

 

             INR (BEAKER) (test code = 370) 1.4          <=5.9                  

   



RECOMMENDED COUMADIN/WARFARIN INR THERAPY RANGESSTANDARD DOSE: 2.0 - 3.0 
Includes: PROPHYLAXIS for venous thrombosis, systemic embolization; TREATMENT 
for venous thrombosis and/or pulmonary embolus.HIGH RISK: Target INR is 2.5-3.5 
for patients with mechanical heart valves.COMPREHENSIVE METABOLIC PANEL
2017 07:07:00





             Test Item    Value        Reference Range Interpretation Comments

 

             TOTAL PROTEIN 6.6 gm/dL    6.0-8.3                   



             (BEAKER) (test code =                                        



             770)                                                

 

             ALBUMIN (BEAKER) 2.8 g/dL     3.5-5.0      L            



             (test code = 1145)                                        

 

             ALKALINE PHOSPHATASE 77 U/L                           



             (BEAKER) (test code =                                        



             346)                                                

 

             BILIRUBIN TOTAL 2.6 mg/dL    0.2-1.2      H            



             (BEAKER) (test code =                                        



             377)                                                

 

             SODIUM (BEAKER) (test 136 meq/L    136-145                   



             code = 381)                                         

 

             POTASSIUM (BEAKER) 3.6 meq/L    3.5-5.1                   



             (test code = 379)                                        

 

             CHLORIDE (BEAKER) 106 meq/L                        



             (test code = 382)                                        

 

             CO2 (BEAKER) (test 23 meq/L     22-29                     



             code = 355)                                         

 

             BLOOD UREA NITROGEN 11 mg/dL     7-21                      



             (BEAKER) (test code =                                        



             354)                                                

 

             CREATININE (BEAKER) 0.82 mg/dL   0.57-1.25                 



             (test code = 358)                                        

 

             GLUCOSE RANDOM 107 mg/dL           H            



             (BEAKER) (test code =                                        



             652)                                                

 

             CALCIUM (BEAKER) 7.9 mg/dL    8.4-10.2     L            



             (test code = 697)                                        

 

             AST (SGOT) (BEAKER) 34 U/L       5-34                      



             (test code = 353)                                        

 

             ALT (SGPT) (BEAKER) 14 U/L       6-55                      



             (test code = 347)                                        

 

             EGFR (BEAKER) (test 101                                    ESTIMATE

D GFR IS



             code = 1092) mL/min/1.73 sq                           NOT AS ACCURA

TE AS



                          m                                      CREATININE



                                                                 CLEARANCE IN



                                                                 PREDICTING



                                                                 GLOMERULAR



                                                                 FILTRATION RATE

.



                                                                 ESTIMATED GFR I

S



                                                                 NOT APPLICABLE 

FOR



                                                                 DIALYSIS PATIEN

TS.



Specimen slightly [id number] 06:58:00





             Test Item    Value        Reference Range Interpretation Comments

 

             PHOSPHORUS (BEAKER) (test code = 3.1 mg/dL    2.3-4.7              

     



             604)                                                



[id number] 06:58:00





             Test Item    Value        Reference Range Interpretation Comments

 

             MAGNESIUM (BEAKER) (test code = 1.8 mg/dL    1.6-2.6               

    



             627)                                                



HEMOGLOBIN AND [id number] 22:27:00





             Test Item    Value        Reference Range Interpretation Comments

 

             HEMOGLOBIN (BEAKER) (test code = 7.3 GM/DL    13.7-17.5    L       

     



             410)                                                

 

             HEMATOCRIT (BEAKER) (test code = 23.9 %       40.1-51.0    L       

     



             411)                                                



HEMOGLOBIN AND [id number] 13:19:00





             Test Item    Value        Reference Range Interpretation Comments

 

             HEMOGLOBIN (BEAKER) (test code = 7.5 GM/DL    13.7-17.5    L       

     



             410)                                                

 

             HEMATOCRIT (BEAKER) (test code = 24.8 %       40.1-51.0    L       

     



             411)                                                



POCT-GLUCOSE [id number] 12:25:00





             Test Item    Value        Reference Range Interpretation Comments

 

             POC-GLUCOSE METER 128 mg/dL           H            TESTED AT 

[facility] [address]



             (BEAKER) (test code =                                        [address] [place]



             1538)                                               [id number]



MR, ABDOMEN, [id number] 11:54:00Referring: Dr. Qiana Pollack PROTOCOL
FINAL REPORT PATIENT ID: [id number] INDICATION:47-year-old male with cirrhosis. 
Surveillance for hepatocellular carcinoma. COMPARISON: Abdomen pelvis CT exam 
2016 TECHNIQUE: MR of the AbdomenWITHOUT and WITH intravenous 
contrast. FINDINGS: Irregular contour of the liver is in keeping with cirrhosis.
 No significant lobar hypertrophy or atrophy is demonstrated. Diffuse mild 
heterogeneous signal of the liver parenchyma is noted. No suspicious enhancing 
liver mass is demonstrated. There is nothrombosis in the portal vein, superior 
mesenteric vein, or splenic vein. Main portal vein is mildlydilated measuring 
1.3 cm in diameter. Spleen is mildly enlarged measuring 14 cm in sagittal 
diameter. There is no ascites or upper abdominal lymphadenopathy. No significant
 varices are demonstrated. Gallbladder is notable for a few small stones. There 
is no biliary ductal dilatation. Pancreas, adrenalglands, kidneys, and 
visualized bowel loops are unremarkable. Partial imaging of the lower thorax is
unremarkable. No suspicious marrow signal abnormality demonstrated. 
IMPRESSION:Cirrhosis without evidence of hepatocellular carcinoma. No portal 
vein, superior mesenteric vein, or splenic vein thrombosis. Mildly dilated main 
portal vein and mild prostatomegaly. No ascites. Cholelithiasis. Signed: Rommel Mendoza MDReport Verified Date/Time: 2017 11:54:46 Reading Location: 
[address] Reading Room Electronically signed by: ROMMEL YAP M.D. on 2017 11:54 [id number] 06:12:00





             Test Item    Value        Reference Range Interpretation Comments

 

             PHOSPHORUS (BEAKER) (test code = 2.0 mg/dL    2.3-4.7      L       

     



             604)                                                



[id number] 06:12:00





             Test Item    Value        Reference Range Interpretation Comments

 

             MAGNESIUM (BEAKER) (test code = 1.7 mg/dL    1.6-2.6               

    



             627)                                                



COMPREHENSIVE METABOLIC [id number] 06:12:00





             Test Item    Value        Reference Range Interpretation Comments

 

             TOTAL PROTEIN 6.5 gm/dL    6.0-8.3                   



             (BEAKER) (test code =                                        



             770)                                                

 

             ALBUMIN (BEAKER) 2.8 g/dL     3.5-5.0      L            



             (test code = 1145)                                        

 

             ALKALINE PHOSPHATASE 79 U/L                           



             (BEAKER) (test code =                                        



             346)                                                

 

             BILIRUBIN TOTAL 3.0 mg/dL    0.2-1.2      H            



             (BEAKER) (test code =                                        



             377)                                                

 

             SODIUM (BEAKER) (test 139 meq/L    136-145                   



             code = 381)                                         

 

             POTASSIUM (BEAKER) 3.7 meq/L    3.5-5.1                   



             (test code = 379)                                        

 

             CHLORIDE (BEAKER) 109 meq/L           H            



             (test code = 382)                                        

 

             CO2 (BEAKER) (test 26 meq/L     22-29                     



             code = 355)                                         

 

             BLOOD UREA NITROGEN 19 mg/dL     7-21                      



             (BEAKER) (test code =                                        



             354)                                                

 

             CREATININE (BEAKER) 0.88 mg/dL   0.57-1.25                 



             (test code = 358)                                        

 

             GLUCOSE RANDOM 113 mg/dL           H            



             (BEAKER) (test code =                                        



             652)                                                

 

             CALCIUM (BEAKER) 8.0 mg/dL    8.4-10.2     L            



             (test code = 697)                                        

 

             AST (SGOT) (BEAKER) 36 U/L       5-34         H            



             (test code = 353)                                        

 

             ALT (SGPT) (BEAKER) 16 U/L       6-55                      



             (test code = 347)                                        

 

             EGFR (BEAKER) (test 93 mL/min/1.73                           ESTIMA

SUSI GFR IS



             code = 1092) sq m                                   NOT AS ACCURATE

 AS



                                                                 CREATININE



                                                                 CLEARANCE IN



                                                                 PREDICTING



                                                                 GLOMERULAR



                                                                 FILTRATION RATE

.



                                                                 ESTIMATED GFR I

S



                                                                 NOT APPLICABLE 

FOR



                                                                 DIALYSIS PATIEN

TS.



Specimen slightly ictericCBC W/PLT COUNT &amp; AUTO [id number] 
05:43:00





             Test Item    Value        Reference Range Interpretation Comments

 

             WHITE BLOOD CELL COUNT 3.8 K/ L     3.5-10.5                  



             (BEAKER) (test code =                                        



             775)                                                

 

             RED BLOOD CELL COUNT 3.19 M/ L    4.63-6.08    L            



             (BEAKER) (test code =                                        



             761)                                                

 

             HEMOGLOBIN (BEAKER) 7.6 GM/DL    13.7-17.5    L            



             (test code = 410)                                        

 

             HEMATOCRIT (BEAKER) 24.9 %       40.1-51.0    L            



             (test code = 411)                                        

 

             MEAN CORPUSCULAR VOLUME 78.1 fL      79.0-92.2    L            



             (BEAKER) (test code =                                        



             753)                                                

 

             MEAN CORPUSCULAR 23.8 pg      25.7-32.2    L            



             HEMOGLOBIN (BEAKER)                                        



             (test code = 751)                                        

 

             MEAN CORPUSCULAR 30.5 GM/DL   32.3-36.5    L            



             HEMOGLOBIN CONC                                        



             (BEAKER) (test code =                                        



             752)                                                

 

             RED CELL DISTRIBUTION 17.7 %       11.6-14.4    H            



             WIDTH (BEAKER) (test                                        



             code = 412)                                         

 

             PLATELET COUNT (BEAKER) 61 K/CU MM   150-450      L            



             (test code = 756)                                        

 

             MEAN PLATELET VOLUME  fL          9.4-12.4                  Unable 

to report due



             (BEAKER) (test code =                                        to abn

ormal Platelet



             754)                                                population



                                                                 distribution.

 

             NUCLEATED RED BLOOD 0 /100 WBC   0-0                       



             CELLS (BEAKER) (test                                        



             code = 413)                                         

 

             NEUTROPHILS RELATIVE 59 %                                   



             PERCENT (BEAKER) (test                                        



             code = 429)                                         

 

             LYMPHOCYTES RELATIVE 22 %                                   



             PERCENT (BEAKER) (test                                        



             code = 430)                                         

 

             MONOCYTES RELATIVE 11 %                                   



             PERCENT (BEAKER) (test                                        



             code = 431)                                         

 

             EOSINOPHILS RELATIVE 7 %                                    



             PERCENT (BEAKER) (test                                        



             code = 432)                                         

 

             BASOPHILS RELATIVE 1 %                                    



             PERCENT (BEAKER) (test                                        



             code = 437)                                         

 

             NEUTROPHILS ABSOLUTE 2.26 K/ L    1.78-5.38                 



             COUNT (BEAKER) (test                                        



             code = 670)                                         

 

             LYMPHOCYTES ABSOLUTE 0.83 K/ L    1.32-3.57    L            



             COUNT (BEAKER) (test                                        



             code = 414)                                         

 

             MONOCYTES ABSOLUTE 0.42 K/ L    0.30-0.82                 



             COUNT (BEAKER) (test                                        



             code = 415)                                         

 

             EOSINOPHILS ABSOLUTE 0.27 K/ L    0.04-0.54                 



             COUNT (BEAKER) (test                                        



             code = 416)                                         

 

             BASOPHILS ABSOLUTE 0.03 K/ L    0.01-0.08                 



             COUNT (BEAKER) (test                                        



             code = 417)                                         

 

             IMMATURE     1 %          0-1                       



             GRANULOCYTES-RELATIVE                                        



             PERCENT (BEAKER) (test                                        



             code = 2801)                                        



HEMOGLOBIN AND [id number] 05:40:00





             Test Item    Value        Reference Range Interpretation Comments

 

             HEMOGLOBIN (BEAKER) (test code = 7.6 GM/DL    13.7-17.5    L       

     



             410)                                                

 

             HEMATOCRIT (BEAKER) (test code = 24.9 %       40.1-51.0    L       

     



             411)                                                



POCT-GLUCOSE [id number] 00:46:00





             Test Item    Value        Reference Range Interpretation Comments

 

             POC-GLUCOSE METER 122 mg/dL           H            TESTED AT 

[facility] [address]



             (BEAKER) (test code =                                        [address] [place]



             [address])                                               [address]



HEMOGLOBIN AND [id number] 00:12:00





             Test Item    Value        Reference Range Interpretation Comments

 

             HEMOGLOBIN (BEAKER) (test code = 6.6 GM/DL    13.7-17.5    L       

     



             410)                                                

 

             HEMATOCRIT (BEAKER) (test code = 21.6 %       40.1-51.0    L       

     



             411)                                                



URINALYSIS W/ [id number] 19:27:00





             Test Item    Value        Reference Range Interpretation Comments

 

             COLOR (BEAKER) (test code = Yellow                                 



             470)                                                

 

             CLARITY (BEAKER) (test code = Clear                                

  



             469)                                                

 

             SPECIFIC GRAVITY UA (BEAKER) 1.012        1.001-1.035              

 



             (test code = 468)                                        

 

             PH UA (BEAKER) (test code = 7.5          5.0-8.0                   



             467)                                                

 

             PROTEIN UA (BEAKER) (test code Negative     Negative               

   



             = 464)                                              

 

             GLUCOSE UA (BEAKER) (test code Negative     Negative               

   



             = 365)                                              

 

             KETONES UA (BEAKER) (test code Trace        Negative     A         

   



             = 371)                                              

 

             BILIRUBIN UA (BEAKER) (test Negative     Negative                  



             code = 462)                                         

 

             BLOOD UA (BEAKER) (test code = Negative     Negative               

   



             461)                                                

 

             NITRITE UA (BEAKER) (test code Negative     Negative               

   



             = 465)                                              

 

             LEUKOCYTE ESTERASE UA (BEAKER) Negative     Negative               

   



             (test code = 466)                                        

 

             UROBILINOGEN UA (BEAKER) (test 0.2 mg/dL    0.2-1.0                

   



             code = 463)                                         

 

             RBC UA (BEAKER) (test code = < /HPF                                

 



             519)                                                

 

             WBC UA (BEAKER) (test code = 0 /HPF                                

 



             520)                                                

 

             SOURCE(BEAKER) (test code = Urine, Voided                          

 



             3512)                                               



POCT-GLUCOSE [id number] 18:35:00





             Test Item    Value        Reference Range Interpretation Comments

 

             POC-GLUCOSE METER 191 mg/dL           H            TESTED AT 

[facility] [address]



             (BEAKER) (test code =                                        [facility] GUAJARD[facility]



             1538)                                               [address]



BILIRUBIN, [id number] 18:14:00





             Test Item    Value        Reference Range Interpretation Comments

 

             BILIRUBIN DIRECT (BEAKER) (test 0.6 mg/dL    0.1-0.5      H        

    



             code = 706)                                         



HEMOGLOBIN AND [id number] 17:41:00





             Test Item    Value        Reference Range Interpretation Comments

 

             HEMOGLOBIN (BEAKER) (test code = 7.0 GM/DL    13.7-17.5    L       

     



             410)                                                

 

             HEMATOCRIT (BEAKER) (test code = 22.9 %       40.1-51.0    L       

     



             411)                                                



T4, [id number] 13:51:00





             Test Item    Value        Reference Range Interpretation Comments

 

             FREE T4 (BEAKER) (test code = 655) 0.86 ng/dL   0.70-1.48          

       



[id number] 13:51:00





             Test Item    Value        Reference Range Interpretation Comments

 

             THYROID STIMULATING HORMONE 0.36 uIU/mL  0.35-4.94                 



             (BEAKER) (test code = 772)                                        



HEPATIC FUNCTION [id number] 13:26:00





             Test Item    Value        Reference Range Interpretation Comments

 

             TOTAL PROTEIN (BEAKER) (test code = 7.2 gm/dL    6.0-8.3           

        



             770)                                                

 

             ALBUMIN (BEAKER) (test code = 1145) 3.1 g/dL     3.5-5.0      L    

        

 

             BILIRUBIN TOTAL (BEAKER) (test code 3.1 mg/dL    0.2-1.2      H    

        



             = 377)                                              

 

             BILIRUBIN DIRECT (BEAKER) (test 0.6 mg/dL    0.1-0.5      H        

    



             code = 706)                                         

 

             ALKALINE PHOSPHATASE (BEAKER) (test 87 U/L                   

        



             code = 346)                                         

 

             AST (SGOT) (BEAKER) (test code = 32 U/L       5-34                 

     



             353)                                                

 

             ALT (SGPT) (BEAKER) (test code = 18 U/L       6-55                 

     



             347)                                                



Specimen slightly ictericHEMOGLOBIN AND [id number] 12:29:00





             Test Item    Value        Reference Range Interpretation Comments

 

             HEMOGLOBIN (BEAKER) (test code = 7.2 GM/DL    13.7-17.5    L       

     



             410)                                                

 

             HEMATOCRIT (BEAKER) (test code = 23.8 %       40.1-51.0    L       

     



             411)                                                



BASIC METABOLIC [id number] 12:03:00





             Test Item    Value        Reference Range Interpretation Comments

 

             SODIUM (BEAKER) 140 meq/L    136-145                   



             (test code = 381)                                        

 

             POTASSIUM (BEAKER) 4.7 meq/L    3.5-5.1                   



             (test code = 379)                                        

 

             CHLORIDE (BEAKER) 108 meq/L           H            



             (test code = 382)                                        

 

             CO2 (BEAKER) (test 24 meq/L     22-29                     



             code = 355)                                         

 

             BLOOD UREA NITROGEN 22 mg/dL     7-21         H            



             (BEAKER) (test code                                        



             = 354)                                              

 

             CREATININE (BEAKER) 0.78 mg/dL   0.57-1.25                 



             (test code = 358)                                        

 

             GLUCOSE RANDOM 155 mg/dL           H            



             (BEAKER) (test code                                        



             = 652)                                              

 

             CALCIUM (BEAKER) 8.1 mg/dL    8.4-10.2     L            



             (test code = 697)                                        

 

             EGFR (BEAKER) (test 107 mL/min/1.73                           ESTIM

ATED GFR IS



             code = 1092) sq m                                   NOT AS ACCURATE

 AS



                                                                 CREATININE



                                                                 CLEARANCE IN



                                                                 PREDICTING



                                                                 GLOMERULAR



                                                                 FILTRATION RATE

.



                                                                 ESTIMATED GFR I

S



                                                                 NOT APPLICABLE 

FOR



                                                                 DIALYSIS PATIEN

TS.



Specimen slightly ictericRAD, CHEST, 1 VIEW, NON [id number] 11:40:00
Referring: Dr. Qiana Joshi for exam:-&gt;pulmonary insufficiencyShould
 this be performed at the bedside?-&gt;YesFINAL REPORT PATIENT ID: [id number] 
Chest one view AP 2017 11:40 AM CLINICAL HISTORY: pulmonary insufficiency 
COMPARISON: None available FINDINGS: The lungs are clear. Cardiomediastinal 
contours are within normal limits. The central pulmonary vasculature is not 
engorged. IMPRESSION: Unremarkable frontal chest radiograph. Signed: Seipel, Timothy MDReport Verified Date/Time: 2017 11:40:03 Reading Location: [facility] Radiology Reading Room Electronically signed by: TIMOTHY J SEIPEL, M.D. on 2017 11:40 AMCBC W/PLT COUNT &amp; AUTO [id number] 
10:54:00





             Test Item    Value        Reference Range Interpretation Comments

 

             WHITE BLOOD CELL COUNT 6.1 K/ L     3.5-10.5                  



             (BEAKER) (test code =                                        



             775)                                                

 

             RED BLOOD CELL COUNT 3.06 M/ L    4.63-6.08    L            



             (BEAKER) (test code =                                        



             761)                                                

 

             HEMOGLOBIN (BEAKER) 7.2 GM/DL    13.7-17.5    L            



             (test code = 410)                                        

 

             HEMATOCRIT (BEAKER) 23.7 %       40.1-51.0    L            



             (test code = 411)                                        

 

             MEAN CORPUSCULAR VOLUME 77.5 fL      79.0-92.2    L            



             (BEAKER) (test code =                                        



             753)                                                

 

             MEAN CORPUSCULAR 23.5 pg      25.7-32.2    L            



             HEMOGLOBIN (BEAKER)                                        



             (test code = 751)                                        

 

             MEAN CORPUSCULAR 30.4 GM/DL   32.3-36.5    L            



             HEMOGLOBIN CONC                                        



             (BEAKER) (test code =                                        



             752)                                                

 

             RED CELL DISTRIBUTION 18.6 %       11.6-14.4    H            



             WIDTH (BEAKER) (test                                        



             code = 412)                                         

 

             PLATELET COUNT (BEAKER) 69 K/CU MM   150-450      L            



             (test code = 756)                                        

 

             MEAN PLATELET VOLUME  fL          9.4-12.4                  Unable 

to report due



             (BEAKER) (test code =                                        to abn

ormal Platelet



             754)                                                population



                                                                 distribution.

 

             NUCLEATED RED BLOOD 0 /100 WBC   0-0                       



             CELLS (BEAKER) (test                                        



             code = 413)                                         

 

             NEUTROPHILS RELATIVE 70 %                                   



             PERCENT (BEAKER) (test                                        



             code = 429)                                         

 

             LYMPHOCYTES RELATIVE 16 %                                   



             PERCENT (BEAKER) (test                                        



             code = 430)                                         

 

             MONOCYTES RELATIVE 13 %                                   



             PERCENT (BEAKER) (test                                        



             code = 431)                                         

 

             EOSINOPHILS RELATIVE 0 %                                    



             PERCENT (BEAKER) (test                                        



             code = 432)                                         

 

             BASOPHILS RELATIVE 1 %                                    



             PERCENT (BEAKER) (test                                        



             code = 437)                                         

 

             NEUTROPHILS ABSOLUTE 4.25 K/ L    1.78-5.38                 



             COUNT (BEAKER) (test                                        



             code = 670)                                         

 

             LYMPHOCYTES ABSOLUTE 0.98 K/ L    1.32-3.57    L            



             COUNT (BEAKER) (test                                        



             code = 414)                                         

 

             MONOCYTES ABSOLUTE 0.79 K/ L    0.30-0.82                 



             COUNT (BEAKER) (test                                        



             code = 415)                                         

 

             EOSINOPHILS ABSOLUTE 0.00 K/ L    0.04-0.54    L            



             COUNT (BEAKER) (test                                        



             code = 416)                                         

 

             BASOPHILS ABSOLUTE 0.04 K/ L    0.01-0.08                 



             COUNT (BEAKER) (test                                        



             code = 417)                                         

 

             IMMATURE     1 %          0-1                       



             GRANULOCYTES-RELATIVE                                        



             PERCENT (BEAKER) (test                                        



             code = 2801)                                        



PROTHROMBIN [id number] 10:50:00





             Test Item    Value        Reference Range Interpretation Comments

 

             PROTIME (BEAKER) (test code = 17.5 seconds 11.7-14.7    H          

  



             759)                                                

 

             INR (BEAKER) (test code = 370) 1.4          <=5.9                  

   



RECOMMENDED COUMADIN/WARFARIN INR THERAPY RANGESSTANDARD DOSE: 2.0 - 3.0 
Includes: PROPHYLAXIS for venous thrombosis, systemic embolization; TREATMENT 
for venous thrombosis and/or pulmonary embolus.HIGH RISK: Target INR is 2.5-3.5 
for patients with mechanical heart valves.

## 2022-08-15 NOTE — EDPHYS
Physician Documentation                                                                           

 [facility]                                                                 

Name: Emanuel Burch                                                                             

Age: 52 yrs                                                                                       

Sex: Male                                                                                         

: 1970                                                                                   

MRN: [id number]                                                                                   

Arrival Date: 08/15/2022                                                                          

Time: 09:57                                                                                       

Account#: [id number]                                                                            

Bed 11                                                                                            

Private MD:                                                                                       

ED Physician Earnest Villa                                                                         

HPI:                                                                                              

08/15                                                                                             

11:12 This 52 yrs old  Male presents to ER via Un[facility] with complaints of          ms3 

      leaking/drainage from paracentesis site.                                                    

11:12 Patient states he had a paracentesis performed on Wednesday and began leaking fluid on  ms3 

      Thursday. Patient denies pain. Patient denies fevers, chills, nausea or vomiting..          

      Onset: The symptoms/episode began/occurred 3 day(s) ago. Severity of symptoms: At their     

      worst the symptoms were very mild in the emergency department the symptoms are              

      unchanged Pain is currently a 0 / 10.                                                       

                                                                                                  

Historical:                                                                                       

- Allergies:                                                                                      

11:25 No Known Allergies;                                                                     jl7 

- PMHx:                                                                                           

11:25 Cirrhosis; esophageal varices;                                                          jl7 

- PSHx:                                                                                           

11:25 ankle repair; TIPS procedure;                                                           jl7 

                                                                                                  

- Immunization history:: Adult Immunizations unknown.                                             

- Social history:: Smoking status: unknown.                                                       

                                                                                                  

                                                                                                  

ROS:                                                                                              

11:12 Constitutional: Negative for fever, and chills. Neck: Negative for injury, pain, and    ms3 

      swelling, Cardiovascular: Negative for chest pain, and palpitations. Respiratory:           

      Negative for shortness of breath, cough, wheezing, and pleuritic chest pain,                

      Abdomen/GI: Negative for abdominal pain, nausea, vomiting, diarrhea, and constipation,      

      Back: Negative for injury and pain, MS/Extremity: Negative for injury and deformity.        

11:12 Skin: Positive for Leaking abdominal fluid from paracentesis site.                          

11:12 All other systems are negative.                                                             

                                                                                                  

Exam:                                                                                             

11:12 Constitutional:  This is a well developed, well nourished patient who is awake, alert,  ms3 

      and in no acute distress. Head/Face:  Normocephalic, atraumatic. Neck:  Trachea             

      midline, no cervical lymphadenopathy.  Supple, full range of motion without nuchal          

      rigidity, or vertebral point tenderness.  No Meningismus. Chest/axilla:  Normal chest       

      wall appearance and motion.  Nontender with no deformity.   Cardiovascular:  Regular        

      rate and rhythm with a normal S1 and S2.  No gallops, murmurs, or rubs.  Normal PMI, no     

      JVD.  No pulse deficits. Respiratory:  Lungs have equal breath sounds bilaterally,          

      clear to auscultation and percussion.  No rales, rhonchi or wheezes noted.  No              

      increased work of breathing, no retractions or nasal flaring. Abdomen/GI:  Soft,            

      non-tender, with normal bowel sounds.  No distension or tympany.  No guarding or            

      rebound.  No evidence of tenderness throughout. MS/ Extremity:  Pulses equal, no            

      cyanosis.  Neurovascular intact.  Full, normal range of motion. Psych:  Awake, alert,       

      with orientation to person, place and time.  Behavior, mood, and affect are within          

      normal limits.                                                                              

11:12 Skin: Puncture site on right abdomen draining yellow fluid.                                 

                                                                                                  

Vital Signs:                                                                                      

11:24  / 66; Pulse 103; Resp 17; Temp 97.7; Pulse Ox 100% ; Weight 86.18 kg; Height 5   jl7 

      ft. 8 in. (172.72 cm); Pain 0/10;                                                           

11:24 Body Mass Index 28.89 (86.18 kg, 172.72 cm)                                             jl7 

                                                                                                  

Laceration:                                                                                       

11:17 Wound Repair of .2cm ( 0.1in ) subcutaneous laceration to right abdomen. Small          ms3 

      incision/ puncture from paracentesis. Distal neuro/vascular/tendon intact. Anesthesia:      

      Local anesthetic administered with 4 mls of 1% lidocaine. Wound prep: Chloraprep. Skin      

      closed with 1 4-0 Prolene using Figure of 8. Dressed with 4x4's. Patient tolerated well.    

                                                                                                  

MDM:                                                                                              

10:56 Patient medically screened.                                                             ms3 

11:12 Differential Diagnosis. Data reviewed: vital signs, nurses notes, old medical records,  ms3 

      Paracentesis on last ED visit by IR. Counseling: I had a detailed discussion with the       

      patient and/or guardian regarding: the historical points, exam findings, and any            

      diagnostic results supporting the discharge/admit diagnosis, the need for outpatient        

      follow up, to return to the emergency department if symptoms worsen or persist or if        

      there are any questions or concerns that arise at home. Special discussion: I discussed     

      with the patient/guardian in detail that at this point there is no indication for           

      admission to the hospital. It is understood, however, that if the symptoms persist or       

      worsen the patient needs to return immediately for re-evaluation.                           

                                                                                                  

Administered Medications:                                                                         

No medications were administered                                                                  

                                                                                                  

                                                                                                  

Disposition Summary:                                                                              

08/15/22 11:12                                                                                    

Discharge Ordered                                                                                 

      Location: Home                                                                          ms3 

      Condition: Stable                                                                       ms3 

      Diagnosis                                                                                   

        - Paracentesis complication                                                           ms3 

      Followup:                                                                               ms3 

        - With: Min Santos DO                                                                  

        - When: 7 - 10 days                                                                        

        - Reason: Staple/Suture removal                                                            

      Discharge Instructions:                                                                     

        - Discharge Summary Sheet                                                             ms3 

        - Paracentesis, Care After                                                            ms3 

      Forms:                                                                                      

        - Medication Reconciliation Form                                                      ms3 

        - Thank You Letter                                                                    ms3 

        - Antibiotic Education                                                                ms3 

        - Prescription Opioid Use                                                             ms3 

Signatures:                                                                                       

Janae Perez RN                        RN   jl7                                                  

Earnest Villa DO                        DO   ms3                                                  

                                                                                                  

**************************************************************************************************

## 2022-08-15 NOTE — ER
Nurse's Notes                                                                                     

 [facility]                                                                 

Name: Emanuel Burch                                                                             

Age: 52 yrs                                                                                       

Sex: Male                                                                                         

: 1970                                                                                   

MRN: [id number]                                                                                   

Arrival Date: 08/15/2022                                                                          

Time: 09:57                                                                                       

Account#: [id number]                                                                            

Bed 11                                                                                            

Private MD:                                                                                       

Diagnosis: Paracentesis complication                                                              

                                                                                                  

Presentation:                                                                                     

08/15                                                                                             

11:24 Chief complaint: Patient states: leaking to paracentesis site. Coronavirus screen: At   jl7 

      this time, the client does not indicate any symptoms associated with coronavirus-19.        

      Ebola Screen: No symptoms or risks identified at this time. Initial Sepsis Screen: Does     

      the patient meet any 2 criteria? No. Patient's initial sepsis screen is negative. Does      

      the patient have a suspected source of infection? No. Patient's initial sepsis screen       

      is negative. Risk Assessment: Do you want to hurt yourself or someone else? Patient         

      reports no desire to harm self or others. Onset of symptoms was August 15, 2022.            

11:24 Method Of Arrival: Ambulatory                                                           jl7 

11:24 Acuity: CHERYL 4                                                                           jl7 

                                                                                                  

Triage Assessment:                                                                                

11:25 General: Appears in no apparent distress. uncomfortable, Behavior is calm, cooperative, jl7 

      appropriate for age. Pain: Denies pain. Derm: Skin is dry, Skin is jaundiced, Skin          

      temperature is warm. Musculoskeletal:.                                                      

                                                                                                  

Historical:                                                                                       

- Allergies:                                                                                      

11:25 No Known Allergies;                                                                     jl7 

- PMHx:                                                                                           

11:25 Cirrhosis; esophageal varices;                                                          jl7 

- PSHx:                                                                                           

11:25 ankle repair; TIPS procedure;                                                           jl7 

                                                                                                  

- Immunization history:: Adult Immunizations unknown.                                             

- Social history:: Smoking status: unknown.                                                       

                                                                                                  

                                                                                                  

Screenin:26 Abuse screen: Denies threats or abuse. Denies injuries from another. Nutritional        jl7 

      screening: No deficits noted. Tuberculosis screening: No symptoms or risk factors           

      identified. Fall Risk None identified.                                                      

                                                                                                  

Vital Signs:                                                                                      

11:24  / 66; Pulse 103; Resp 17; Temp 97.7; Pulse Ox 100% ; Weight 86.18 kg; Height 5   jl7 

      ft. 8 in. (172.72 cm); Pain 0/10;                                                           

11:24 Body Mass Index 28.89 (86.18 kg, 172.72 cm)                                             jl7 

                                                                                                  

ED Course:                                                                                        

:57 Patient arrived in ED.                                                                  am2 

10:21 Earnest Villa DO is Attending Physician.                                                ms3 

11:11 Min Santos DO is Referral Physician.                                                ms3 

11:24 Janae Perez, RN is Primary Nurse.                                                      jl7 

11:25 Triage completed.                                                                       jl7 

11:25 Arm band placed on right wrist.                                                         jl7 

11:26 Patient has correct armband on for positive identification.                             jl7 

11:26 Assist provider with laceration repair on abdomen that was 2.5 cm. or less using        magdi7 

      sutures. Set up tray. Performed by Earnest Villa DO Dressed with 4X4s, Patient tolerated      

      well. Patient did not have IV access during this emergency room visit.                      

                                                                                                  

Administered Medications:                                                                         

No medications were administered                                                                  

                                                                                                  

                                                                                                  

Medication:                                                                                       

: VIS not applicable for this client.                                                     jl7 

                                                                                                  

Outcome:                                                                                          

11:12 Discharge ordered by MD.                                                                ms3 

11:26 Discharged to home ambulatory.                                                          jl7 

11: Condition: stable                                                                           

11:26 Discharge instructions given to patient, Instructed on discharge instructions, follow       

      up and referral plans. Demonstrated understanding of instructions, follow-up care.          

11:28 Patient left the ED.                                                                    jl7 

                                                                                                  

Signatures:                                                                                       

Janae Perez RN                        RN   jl7                                                  

Dianna Rene Marcus, DO DO   ms3                                                  

                                                                                                  

**************************************************************************************************

## 2022-08-22 ENCOUNTER — HOSPITAL ENCOUNTER (EMERGENCY)
Dept: HOSPITAL 97 - ER | Age: 52
Discharge: ANOTHER HOSPITAL | End: 2022-08-22
Payer: SELF-PAY

## 2022-08-22 VITALS — OXYGEN SATURATION: 98 % | DIASTOLIC BLOOD PRESSURE: 78 MMHG | SYSTOLIC BLOOD PRESSURE: 126 MMHG

## 2022-08-22 DIAGNOSIS — Z20.822: ICD-10-CM

## 2022-08-22 DIAGNOSIS — K72.00: Primary | ICD-10-CM

## 2022-08-22 LAB
ALBUMIN SERPL BCP-MCNC: 1.8 G/DL (ref 3.4–5)
ALP SERPL-CCNC: 113 U/L (ref 45–117)
ALT SERPL W P-5'-P-CCNC: 40 U/L (ref 12–78)
ANISOCYTOSIS BLD QL: SLIGHT
AST SERPL W P-5'-P-CCNC: 62 U/L (ref 15–37)
BUN BLD-MCNC: 18 MG/DL (ref 7–18)
GLUCOSE SERPLBLD-MCNC: 111 MG/DL (ref 74–106)
HCT VFR BLD CALC: 25.9 % (ref 39.6–49)
LIPASE SERPL-CCNC: 803 U/L (ref 73–393)
LYMPHOCYTES # SPEC AUTO: 1 K/UL (ref 0.7–4.9)
MACROCYTES BLD QL: (no result)
MCV RBC: 112.8 FL (ref 80–100)
MORPHOLOGY BLD-IMP: (no result)
PMV BLD: 7.3 FL (ref 7.6–11.3)
POTASSIUM SERPL-SCNC: 4 MMOL/L (ref 3.5–5.1)
RBC # BLD: 2.3 M/UL (ref 4.33–5.43)

## 2022-08-22 PROCEDURE — 96375 TX/PRO/DX INJ NEW DRUG ADDON: CPT

## 2022-08-22 PROCEDURE — 99285 EMERGENCY DEPT VISIT HI MDM: CPT

## 2022-08-22 PROCEDURE — 87040 BLOOD CULTURE FOR BACTERIA: CPT

## 2022-08-22 PROCEDURE — 80053 COMPREHEN METABOLIC PANEL: CPT

## 2022-08-22 PROCEDURE — 74177 CT ABD & PELVIS W/CONTRAST: CPT

## 2022-08-22 PROCEDURE — 96374 THER/PROPH/DIAG INJ IV PUSH: CPT

## 2022-08-22 PROCEDURE — 85025 COMPLETE CBC W/AUTO DIFF WBC: CPT

## 2022-08-22 PROCEDURE — 36415 COLL VENOUS BLD VENIPUNCTURE: CPT

## 2022-08-22 PROCEDURE — 82140 ASSAY OF AMMONIA: CPT

## 2022-08-22 PROCEDURE — 87811 SARS-COV-2 COVID19 W/OPTIC: CPT

## 2022-08-22 PROCEDURE — 83690 ASSAY OF LIPASE: CPT

## 2022-08-22 PROCEDURE — 70450 CT HEAD/BRAIN W/O DYE: CPT

## 2022-08-22 PROCEDURE — 80320 DRUG SCREEN QUANTALCOHOLS: CPT

## 2022-08-22 PROCEDURE — 93005 ELECTROCARDIOGRAM TRACING: CPT

## 2022-08-22 NOTE — RAD REPORT
EXAM DESCRIPTION:  CT - Head Brain Wo Cont - 8/22/2022 10:04 am

 

CLINICAL HISTORY:  AMS

 

COMPARISON:  No comparisons

 

TECHNIQUE:  All CT scans are performed using dose optimization technique as appropriate and may inclu
de automated exposure control or mA/KV adjustment according to patient size.

 

FINDINGS:  No intracranial hemorrhage, hydrocephalus or extra-axial fluid collection.No areas of brai
n edema or evidence of midline shift.

 

The right maxillary sinus is opacified. There may be a soft tissue component. The calvarium is intact
.

 

IMPRESSION:  No acute intracranial abnormality.  Opacified right maxillary sinus. Possible soft tissu
e component such as could be seen with a papilloma. Recommend outpatient ENT referral.

## 2022-08-22 NOTE — EDPHYS
Physician Documentation                                                                           

 [facility]                                                                 

Name: Emanuel Burch                                                                             

Age: 52 yrs                                                                                       

Sex: Male                                                                                         

: 1970                                                                                   

MRN: [id number]                                                                                   

Arrival Date: 2022                                                                          

Time: :23                                                                                       

Account#: [id number]                                                                            

Bed 8                                                                                             

Private MD:                                                                                       

ED Physician Gigi Raymond                                                                         

HPI:                                                                                              

                                                                                             

09:38 This 52 yrs old  Male presents to ER via [facility] with complaints of AMS.     rn  

09:38 The patient presents with confusion, decreased mental status, disorientation. Onset:    rn  

      The symptoms/episode began/occurred 1 week(s) ago. Possible causes: unknown. Associated     

      signs and symptoms: Pertinent negatives: abdominal pain, chest pain, headache. Current      

      symptoms: In the emergency department the patient's symptoms are unchanged from the         

      initial presentation. The patient has experienced similar episodes in the past. The         

      patient has not recently seen a physician. EMS reports wife called 911 for AMS,             

      worsening slowly over last week or so. + hx of liver problems and encephalopathy, not       

      compliant with lactulose. Pt states still drinking but wife did not give that               

      information. .                                                                              

                                                                                                  

- Family history:: not pertinent.                                                                 

- Hospitalizations: : No recent hospitalization is reported.                                      

                                                                                                  

                                                                                                  

ROS:                                                                                              

09:38 Constitutional: Negative for fever, chills, and weight loss, Eyes: Negative for injury, rn  

      pain, redness, and discharge, Neck: Negative for injury, pain, and swelling,                

      Cardiovascular: Negative for chest pain, palpitations, and edema, Respiratory: Negative     

      for shortness of breath, cough, wheezing, and pleuritic chest pain, Abdomen/GI:             

      Negative for abdominal pain, nausea, vomiting, diarrhea, and constipation, Back:            

      Negative for injury and pain, : Negative for injury, bleeding, discharge, and             

      swelling, MS/Extremity: Negative for injury and deformity, Skin: Negative for injury,       

      rash, and discoloration, Neuro: Negative for headache, weakness, numbness, tingling,        

      and seizure.                                                                                

                                                                                                  

Exam:                                                                                             

09:38 Constitutional:  Disheveled male, no acute distress Head/Face:  Normocephalic,          rn  

      atraumatic. Eyes:  + scleral icterus ENT:  dry MM Cardiovascular:  Tachycardic, regular     

      Respiratory:  No increased work of breathing, no retractions or nasal flaring.              

      Abdomen/GI:  soft, + fluid wave with distension, no peritoneal signs or guarding Skin:      

      Warm, dry, + jaundice MS/ Extremity:  Pulses equal, no cyanosis.  Neurovascular intact.     

       Full, normal range of motion.  Equal circumference. Neuro:  Awake and alert, GCS 15,       

      oriented to person, not place or time. Moves all 4 extremities.                             

10:37 ECG was reviewed by the Attending Physician.                                            rn  

                                                                                                  

Vital Signs:                                                                                      

09:19  / 82; Pulse 80; Resp 17; Temp 97.6(TE); Pulse Ox 96% on 2 lpm NC; Weight 84.37   jh6 

      kg; Height 5 ft. 8 in. (172.72 cm); Pain 0/10;                                              

10:00  / 85; Pulse 108; Resp 15; Pulse Ox 97% on 2 lpm NC; Pain 2/10;                   jh6 

12:00  / 74; Pulse 110; Resp 20; Pulse Ox 97% on 2 lpm NC; Pain 0/10;                   jh6 

13:00  / 72; Pulse 111; Resp 18; Pulse Ox 98% on 2 lpm NC; Pain 0/10;                   jh6 

14:00  / 81; Pulse 106; Resp 18; Pulse Ox 97% on 2 lpm NC; Pain 3/10;                   jh6 

15:00  / 78; Pulse 105; Resp 20; Pulse Ox 98% on 2 lpm NC; Pain 0/10;                   jh6 

09:19 Body Mass Index 28.28 (84.37 kg, 172.72 cm)                                             [facility] 

                                                                                                  

MDM:                                                                                              

09:24 Patient medically screened.                                                             rn  

10:33 Differential Diagnosis: CVA, electrolyte abnormality, hypoglycemia, TIA, UTI, volume    rn  

      depletion, hepatic encephalopathy. Data reviewed: vital signs, nurses notes, lab test       

      result(s), EKG, radiologic studies, CT scan, and as a result, I will admit patient.         

      Counseling: I had a detailed discussion with the patient and/or guardian regarding: the     

      historical points, exam findings, and any diagnostic results supporting the                 

      discharge/admit diagnosis, lab results, radiology results, the need for further work-up     

      and treatment in the hospital. Admission orders: after a detailed discussion of the         

      patient's condition and case, the admit orders are written by me.                           

13:54 ED course: Initially was going to be admitted here to hospitalist service, their        rn  

      concern was not having GI or hepatology, and worsening liver failure. Accepted for          

      transfer to [facility]. .                                                  

                                                                                                  

                                                                                             

09:25 Order name: CBC with Diff; Complete Time: 10:51                                         rn  

                                                                                             

09:25 Order name: CMP; Complete Time: 10:51                                                   rn  

                                                                                             

09:25 Order name: Lipase; Complete Time: 10:51                                                rn  

                                                                                             

09:25 Order name: AMMONIA; Complete Time: 10:15                                               rn  

                                                                                             

09:25 Order name: Blood Culture Adult (2)                                                     rn  

                                                                                             

09:25 Order name: ETOH Level; Complete Time: 10:15                                            rn  

                                                                                             

09:25 Order name: CT Head Brain wo Cont; Complete Time: 10:15                                 rn  

                                                                                             

09:25 Order name: SARS RAPID; Complete Time: 10:15                                            rn  

                                                                                             

10:51 Order name: Manual Differential; Complete Time: 10:51                                   EDMS

                                                                                             

12:17 Order name: CT Abd/Pelvis - IV Contrast Only; Complete Time: 12:56                      rn  

                                                                                             

09:25 Order name: IV Saline Lock; Complete Time: 10:19                                        rn  

                                                                                             

09:25 Order name: Labs collected and sent; Complete Time: 10:19                               rn  

                                                                                             

09:25 Order name: Glucose Level; Complete Time: 14:09                                         rn  

                                                                                             

09:25 Order name: Cardiac monitoring; Complete Time: 10:11                                    rn  

                                                                                             

09:25 Order name: O2 Sat Monitoring; Complete Time: 10:11                                     rn  

                                                                                             

09:25 Order name: EKG; Complete Time: 09:26                                                   rn  

                                                                                             

09:25 Order name: EKG - Nurse/Tech; Complete Time: 10:11                                      rn  

                                                                                                  

ECG:                                                                                              

10:37 Rate is 106 beats/min. Rhythm is regular. QRS Axis is Normal. GA interval is normal.    rn  

      QRS interval is normal. QT interval is normal. No Q waves. T waves are Normal. No ST        

      changes noted. Clinical impression: Sinus tachycardia. Interpreted by me. Reviewed by       

      me.                                                                                         

                                                                                                  

Administered Medications:                                                                         

10:18 Drug: Zofran (Ondansetron) 4 mg Route: IVP; Site: right antecubital;                    [facility] 

11:22 Drug: Lactulose 20 grams Volume: 30 ml; Route: PO;                                      [facility] 

14:08 Drug: Rocephin (cefTRIAXone) 1 grams Route: IV; Rate: calculated rate; Site: right      [facility] 

      antecubital;                                                                                

                                                                                                  

                                                                                                  

Disposition Summary:                                                                              

22 13:53                                                                                    

Transfer Ordered                                                                                  

      Transfer Location: [facility]                                rn  

      Reason: Higher level of care                                                            rn  

      Condition: Stable(22 13:53)                                                       rn  

      Problem: new(22 13:53)                                                            rn  

      Symptoms: are unchanged(22 13:53)                                                 rn  

      Accepting Physician:  (22 16:41)                                               jh6 

      Diagnosis                                                                                   

        - Hepatic Encephalopathy                                                              rn  

        - Altered mental status, unspecified(22 13:53)                                  rn  

        - Acute and subacute hepatic failure                                                  rn  

      Forms:                                                                                      

        - Medication Reconciliation Form                                                      rn  

        - SBAR form                                                                           rn  

Signatures:                                                                                       

Dispatcher [employer]                           EDMS                                                 

Gigi Raymond MD MD rn Hastedt, Jennifer RN                   RN   jh6                                                  

                                                                                                  

Corrections: (The following items were deleted from the chart)                                    

13:52 10:34 Inpatient Admission rn                                                            rn  

13:52 10:34 Adiel Raymond rn                                                                 rn  

13:52 10:34 Telemetry/MedSurg (Inpatient) rn                                                  rn  

13:52 10:34 Stable rn                                                                         rn  

13:52 10:34 an ongoing problem rn                                                             rn  

13:52 10:34 are unchanged rn                                                                  rn  

13:52 10:34 Standard rn                                                                       rn  

13:52 10:34 rn                                                                                rn  

13:52 10:34 Altered mental status, unspecified rn                                             rn  

13:52 10:34 Hepatic encephalopathy rn                                                         rn  

16:41 13:53  rn                                                                            jh6 

                                                                                                  

**************************************************************************************************

## 2022-08-22 NOTE — XMS REPORT
Continuity of Care Document

                           Created on:2022



Patient:MEHUL CESAR

Sex:Male

:1970

External Reference #:[id number]





Demographics







                          Address                   [address] 



                                                    [place] [address]

 

                          Home Phone                [phone]

 

                          Mobile Phone              [phone]

 

                          Email Address             [email or web address]

 

                          Preferred Language        English

 

                          Marital Status            Unknown

 

                          Adventism Affiliation     Unknown

 

                          Race                      Unknown

 

                          Additional Race(s)        Unavailable

 

                          Ethnic Group              Unknown









Author







                          Organization              [facility]

t

 

                          Address                   [address]



                                                    [place] [address]

 

                          Phone                     [phone]









Support







                Name            Relationship    Address         Phone

 

                MIGUEL ANGEL CESAR               [address]



                                                [address]             



                                                [place] [address] 









Care Team Providers







                    Name                Role                Phone

 

                    OWENRADHAJERRODCASSIDY PASCAL Primary Care Physician Unavailable

 

                    JONNA MAHMOOD    Attending Clinician Unavailable

 

                    MARGE HOPKINS   Attending Clinician Unavailable

 

                    ENA KNOX      Attending Clinician Unavailable

 

                    MELINDA MEDINA  Attending Clinician Unavailable

 

                    ESTRELLITA POSEY  Attending Clinician Unavailable

 

                    MACEY OLIVAREZ Attending Clinician Unavailable

 

                    JONNA MAHMOOD    Admitting Clinician Unavailable

 

                    MARGE HOPKINS   Admitting Clinician Unavailable

 

                    ENA KNOX      Admitting Clinician Unavailable

 

                    MACEY OLIVAREZ Admitting Clinician Unavailable









Payers







           Payer Name Policy Type Policy Number Effective Date Expiration Date LAURENCE HERNANDEZ OPEN ACCESS            [id number]  2013 



           O NAP                          00:00:00   00:00:00   

 

           [facility]            [id number] 2019            



                                            00:00:00              







Problems

This patient has no known problems.



Allergies, Adverse Reactions, Alerts







       Allergy Allergy Status Severity Reaction(s) Onset  Inactive Treating Comm

ents 

Source



       Name   Type                        Date   Date   Clinician        

 

       NO KNOWN Allergy Active                                           [facility]



       ALLERGIE                                                         



       S                                                              







Medications

This patient has no known medications.



Vital Signs







             Vital Name   Observation Time Observation Value Comments     Source

 

             HEIGHT       2021 15:18:00 172.7 cm                  

 

             WEIGHT       2021 15:18:00 81.194 kg                 

 

             HEIGHT       2021 15:18:00 172.7 cm                  

 

             WEIGHT       2021 15:18:00 81.194 kg                 

 

             HEIGHT       2021 11:00:00 172.7 cm                  

 

             WEIGHT       2021 11:00:00 81.194 kg                 

 

             HEIGHT       2021 11:00:00 172.7 cm                  

 

             WEIGHT       2021 11:00:00 81.194 kg                 







Procedures

This patient has no known procedures.



Encounters







        Start   End     Encounter Admission Attending Care    Care    Encounter 

Source



        Date/Time Date/Time Type    Type    Clinicians Facility Department ID   

   

 

        2021-10-09         Outpatient         TIMOTEO [facility]    Surgery [id number]

 [facility]



        01:00:26                         JONNA                          

 

        2021         Inpatient UR      NALAM  [facility]    Internal [id number]

 [facility]



        14:51:00                         MARGE           Med             

 

        2021 Outpatient HAYES MEDINA [facility]    [id number]

[id number] [facility]



        00:00:00 00:00:00                 RISE                            

 

        2021 Outpatient [facility]    [id number]

[id number] [facility]



        00:00:00 00:00:00                                                 

 

        2021 Outpatient       ABILIO ENA [facility]    Radiology 2

[id number] [facility]



        06:16:51 09:51:00                                                 

 

        2021 Outpatient [facility]    [id number]

[id number] [facility]



        00:00:00 00:00:00                                                 

 

        2021 Outpatient         [facility]    [id number]

023 [facility]



        00:00:00 00:00:00                 ESTRELLITA                          

 

        2021 Outpatient       KALPESH   [facility]    [id number]

022 [facility]



        00:00:00 00:00:00                 ESTRELLITA                          

 

        2021 Outpatient                 [facility]    [id number]

[id number] [facility]



        00:00:00 00:00:00                                                 

 

        2019-12-10 2019-12-10 Outpatient [facility]    [id number]

253 [facility]



        00:00:00 00:00:00                                                 







Results







           Test Description Test Time  Test Comments Results    Result Comments 

Source









                    HEPATIC FUNCTION PANEL 2022 01:12:10 









                      Test Item  Value      Reference Range Interpretation Comme

nts









             PROTEIN, TOTAL (test code = 7.9 G/DL     6.1-8.3                   



             )                                               

 

             ALBUMIN (test code = 2201) 2.0 G/DL     3.5-5.2      L            

 

             BILIRUBIN, TOTAL (test code = 11.3 MG/DL   See_Comment  H          

   [Automated message] The



             )                                               system which ge

nerated this



                                                                 result transmit

susi reference



                                                                 range: <=1.2. T

he reference



                                                                 range was not u

sed to



                                                                 interpret this 

result as



                                                                 normal/abnormal

.

 

             BILIRUBIN, DIRECT (test code = 3.6 MG/DL    0.0-0.3      H         

   



             )                                               

 

             ALKALINE PHOSPHATASE (test 101 U/L                          



             code = 2204)                                        

 

             AST (test code = 2218) 49 U/L       9-50                      

 

             ALT (test code = 2219) 25 U/L       5-50                      



CBC W/AUTO DIFF WITH [id number] 04:37:27





             Test Item    Value        Reference Range Interpretation Comments

 

             WBC (test code = 4.8 K/UL     3.5-11.0                  



             1001)                                               

 

             RBC (test code = 3.14 M/UL    4.50-6.10    L            



             1002)                                               

 

             HEMOGLOBIN (test 11.7 G/DL    13.5-17.0    L            



             code = 1003)                                        

 

             HEMATOCRIT (test 32.9 %       40.0-51.0    L            



             code = 1004)                                        

 

             MCV (test code = 104.8 fL     80.0-99.0    H            



             1005)                                               

 

             MCH (test code = 37.3 PG      25.0-33.0    H            



             1006)                                               

 

             MCHC (test code = 35.6 G/DL    31.0-36.0                 



             1007)                                               

 

             RDW (test code = 12.8 %       11.5-15.0                 



             1038)                                               

 

             NEUTROPHILS (test 58.4 %                                  AUTOMATED



             code = 1008)                                        DIFFERENTIAL



                                                                 CONFIRMED  WITH



                                                                 MANUAL SLIDE RE

VIEW.

 

             LYMPHOCYTES (test 18.6 %                                 



             code = 1010)                                        

 

             MONOCYTES (test code 15.9 %                                 



             = 1011)                                             

 

             EOSINOPHILS (test 5.0 %                                  



             code = 1012)                                        

 

             BASOPHILS (test code 1.3 %                                  



             = 1013)                                             

 

             IMMATURE     0.8 %                                  



             GRANULOCYTES (test                                        



             code = 1036)                                        

 

             NUCLEATED RBCS (test 0.0 /100     See_Comment                [Autom

ated message]



             code = 1065) WBC'S                                  The system whic

h



                                                                 generated this 

result



                                                                 transmitted ref

erence



                                                                 range: 0.0. The



                                                                 reference range

 was



                                                                 not used to int

erpret



                                                                 this result as



                                                                 normal/abnormal

.

 

             PLATELET COUNT (test 92 K/UL      130-400      L            



             code = 1015)                                        

 

             ABSOLUTE NEUTROPHILS 2.80 K/UL    1.50-7.50                 



             (test code = 1066)                                        

 

             ABSOLUTE LYMPHOCYTES 0.89 K/UL    1.00-4.00    L            



             (test code = 1067)                                        

 

             ABSOLUTE MONOCYTES 0.76 K/UL    0.20-1.00                 



             (test code = 1068)                                        

 

             ABSOLUTE EOSINOPHILS 0.24 K/UL    0.00-0.50                 



             (test code = 1040)                                        

 

             ABSOLUTE BASOPHILS 0.06 K/UL    0.00-0.20                 



             (test code = 1069)                                        

 

             ABS IMMATURE 0.04 K/UL    0.00-0.10                 



             GRANULOCYTES (test                                        



             code = 1020)                                        

 

             ABS NUCLEATED RBCS 0.02 K/UL    0.00-0.11                 



             (test code = 35118)                                        

 

             COMMENTS (test code (NOTE)                                  SLIGHT 

MACROCYTOSIS



             = 1016)                                             SEE ADDITIONAL



                                                                 COMMENTS BELOW:



                                                                 PLATELET CLUMPI

NG



                                                                 PRESENT; TRUE



                                                                 PLATELET COUNT 

MAY BE



                                                                 HIGHER. IF CLIN

ICALLY



                                                                 INDICATED, ORDE

R



                                                                 REPEAT PLATELET

 COUNT



                                                                 WITH CITRATE



                                                                 ANTICOAGULATED 

TUBE



                                                                 (CPL ORDER CODE



                                                                 1047). UNLESS



                                                                 OTHERWISE INDIC

ATED,



                                                                 ALL TESTING PER

FORMED



                                                                 ATCLINICAL PATH

[facility], [facility].



                                                                 [address] [place], [place] [address] [place] [address]



                                                                 DIRECTOR: BETH MESA M.D.

 CLIA



                                                                 NUMBER [id number]

[id number]



                                                                 ACCREDITATION N

O.



                                                                 [id number]



MISCELLANEOUS LAB [id number] 13:01:00





             Test Item    Value        Reference Range Interpretation Comments

 

             SCAN RESULT (test code = 3239715)                                  

      



BASIC METABOLIC [id number] 14:32:00





             Test Item    Value        Reference Range Interpretation Comments

 

             SODIUM (BEAKER) 138 meq/L    136-145                   



             (test code = 381)                                        

 

             POTASSIUM (BEAKER) 3.8 meq/L    3.5-5.1                   



             (test code = 379)                                        

 

             CHLORIDE (BEAKER) 107 meq/L                        



             (test code = 382)                                        

 

             CO2 (BEAKER) (test 24 meq/L     22-29                     



             code = 355)                                         

 

             BLOOD UREA NITROGEN 4 mg/dL      7-21         L            



             (BEAKER) (test code                                        



             = 354)                                              

 

             CREATININE (BEAKER) 0.83 mg/dL   0.57-1.25                 



             (test code = 358)                                        

 

             GLUCOSE RANDOM 60 mg/dL            L            



             (BEAKER) (test code                                        



             = 652)                                              

 

             CALCIUM (BEAKER) 8.6 mg/dL    8.4-10.2                  



             (test code = 697)                                        

 

             EGFR (BEAKER) (test 98 mL/min/1.73                           ESTIMA

SUSI GFR IS



             code = 1092) sq m                                   NOT AS ACCURATE

 AS



                                                                 CREATININE



                                                                 CLEARANCE IN



                                                                 PREDICTING



                                                                 GLOMERULAR



                                                                 FILTRATION RATE

.



                                                                 ESTIMATED GFR I

S



                                                                 NOT APPLICABLE 

FOR



                                                                 DIALYSIS PATIEN

TS.



 ID - RHONDA Pearceecimen moderately ictericHEPATIC FUNCTION [id number] 
14:32:00





             Test Item    Value        Reference Range Interpretation Comments

 

             TOTAL PROTEIN (BEAKER) (test code = 7.8 gm/dL    6.0-8.3           

        



             770)                                                

 

             ALBUMIN (BEAKER) (test code = 1145) 2.5 g/dL     3.5-5.0      L    

        

 

             BILIRUBIN TOTAL (BEAKER) (test code 5.8 mg/dL    0.2-1.2      H    

        



             = 377)                                              

 

             BILIRUBIN DIRECT (BEAKER) (test 2.5 mg/dL    0.1-0.5      H        

    



             code = 706)                                         

 

             ALKALINE PHOSPHATASE (BEAKER) (test 163 U/L             H    

        



             code = 346)                                         

 

             AST (SGOT) (BEAKER) (test code = 47 U/L       5-34         H       

     



             353)                                                

 

             ALT (SGPT) (BEAKER) (test code = 18 U/L       6-55                 

     



             347)                                                



 ID - RHONDA Mckeonimedolly moderately ictericPROTHROMBIN [id number] 
14:23:00





             Test Item    Value        Reference Range Interpretation Comments

 

             PROTIME (BEAKER) 21.9 seconds 11.9-14.2    H            



             (test code = 759)                                        

 

             INR (BEAKER) (test 1.97         See_Comment                [Automat

ed message]



             code = 370)                                         The system [employer]



                                                                 generated this 

result



                                                                 transmitted ref

erence



                                                                 range: <=5.90. 

The



                                                                 reference range

 was



                                                                 not used to int

erpret



                                                                 this result as



                                                                 normal/abnormal

.



Effective 2019: PT Reference Range ChangeNew: 11.9-14.2 Previous: 11.7-
14.7RECOMMENDED COUMADIN/WARFARIN INR THERAPY RANGESSTANDARD DOSE: 2.0-3.0 
Includes: PROPHYLAXIS for venous thrombosis, systemic embolization; TREATMENT 
for venous thrombosis and/or pulmonary embolus.HIGH RISK: Target INR is 2.5-3.5 
for patients wiht mechanical heart valves.CBC W/PLT COUNT &amp; AUTO 
[id number] 14:21:00





             Test Item    Value        Reference Range Interpretation Comments

 

             WHITE BLOOD CELL COUNT (BEAKER) 4.5 K/ L     3.5-10.5              

    



             (test code = 775)                                        

 

             RED BLOOD CELL COUNT (BEAKER) 3.70 M/ L    4.63-6.08    L          

  



             (test code = 761)                                        

 

             HEMOGLOBIN (BEAKER) (test code = 12.5 GM/DL   13.7-17.5    L       

     



             410)                                                

 

             HEMATOCRIT (BEAKER) (test code = 37.4 %       40.1-51.0    L       

     



             411)                                                

 

             MEAN CORPUSCULAR VOLUME (BEAKER) 101.1 fL     79.0-92.2    H       

     



             (test code = 753)                                        

 

             MEAN CORPUSCULAR HEMOGLOBIN 33.8 pg      25.7-32.2    H            



             (BEAKER) (test code = 751)                                        

 

             MEAN CORPUSCULAR HEMOGLOBIN CONC 33.4 GM/DL   32.3-36.5            

     



             (BEAKER) (test code = 752)                                        

 

             RED CELL DISTRIBUTION WIDTH 14.9 %       11.6-14.4    H            



             (BEAKER) (test code = 412)                                        

 

             PLATELET COUNT (BEAKER) (test 124 K/CU MM  150-450      L          

  



             code = 756)                                         

 

             MEAN PLATELET VOLUME (BEAKER) 11.8 fL      9.4-12.4                

  



             (test code = 754)                                        

 

             NUCLEATED RED BLOOD CELLS 0 /100 WBC   0-0                       



             (BEAKER) (test code = 413)                                        

 

             NEUTROPHILS RELATIVE PERCENT 43 %                                  

 



             (BEAKER) (test code = 429)                                        

 

             LYMPHOCYTES RELATIVE PERCENT 34 %                                  

 



             (BEAKER) (test code = 430)                                        

 

             MONOCYTES RELATIVE PERCENT 12 %                                   



             (BEAKER) (test code = 431)                                        

 

             EOSINOPHILS RELATIVE PERCENT 10 %                                  

 



             (BEAKER) (test code = 432)                                        

 

             BASOPHILS RELATIVE PERCENT 1 %                                    



             (BEAKER) (test code = 437)                                        

 

             NEUTROPHILS ABSOLUTE COUNT 1.92 K/ L    1.78-5.38                 



             (BEAKER) (test code = 670)                                        

 

             LYMPHOCYTES ABSOLUTE COUNT 1.55 K/ L    1.32-3.57                 



             (BEAKER) (test code = 414)                                        

 

             MONOCYTES ABSOLUTE COUNT (BEAKER) 0.55 K/ L    0.30-0.82           

      



             (test code = 415)                                        

 

             EOSINOPHILS ABSOLUTE COUNT 0.45 K/ L    0.04-0.54                 



             (BEAKER) (test code = 416)                                        

 

             BASOPHILS ABSOLUTE COUNT (BEAKER) 0.05 K/ L    0.01-0.08           

      



             (test code = 417)                                        

 

             IMMATURE GRANULOCYTES-RELATIVE 0 %          0-1                    

   



             PERCENT (BEAKER) (test code =                                      

  



             2801)                                               



MISCELLANEOUS LAB [id number] 10:46:00





             Test Item    Value        Reference Range Interpretation Comments

 

             SCAN RESULT (test code = 4493381)                                  

      



BASIC METABOLIC [id number] 12:16:00





             Test Item    Value        Reference Range Interpretation Comments

 

             SODIUM (BEAKER) 135 meq/L    136-145      L            



             (test code = 381)                                        

 

             POTASSIUM (BEAKER) 3.9 meq/L    3.5-5.1                   



             (test code = 379)                                        

 

             CHLORIDE (BEAKER) 107 meq/L                        



             (test code = 382)                                        

 

             CO2 (BEAKER) (test 25 meq/L     22-29                     



             code = 355)                                         

 

             BLOOD UREA NITROGEN 6 mg/dL      7-21         L            



             (BEAKER) (test code                                        



             = 354)                                              

 

             CREATININE (BEAKER) 0.77 mg/dL   0.57-1.25                 



             (test code = 358)                                        

 

             GLUCOSE RANDOM 83 mg/dL                         



             (BEAKER) (test code                                        



             = 652)                                              

 

             CALCIUM (BEAKER) 7.8 mg/dL    8.4-10.2     L            



             (test code = 697)                                        

 

             EGFR (BEAKER) (test 107 mL/min/1.73                           ESTIM

ATED GFR IS



             code = 1092) sq m                                   NOT AS ACCURATE

 AS



                                                                 CREATININE



                                                                 CLEARANCE IN



                                                                 PREDICTING



                                                                 GLOMERULAR



                                                                 FILTRATION RATE

.



                                                                 ESTIMATED GFR I

S



                                                                 NOT APPLICABLE 

FOR



                                                                 DIALYSIS PATIEN

TS.



 ID Dre TOPETE FSpecimen moderately ictericHEPATIC FUNCTION PANEL
2021 12:07:00





             Test Item    Value        Reference Range Interpretation Comments

 

             TOTAL PROTEIN (BEAKER) (test code = 6.6 gm/dL    6.0-8.3           

        



             770)                                                

 

             ALBUMIN (BEAKER) (test code = 1145) 2.3 g/dL     3.5-5.0      L    

        

 

             BILIRUBIN TOTAL (BEAKER) (test code 5.9 mg/dL    0.2-1.2      H    

        



             = 377)                                              

 

             BILIRUBIN DIRECT (BEAKER) (test 2.2 mg/dL    0.1-0.5      H        

    



             code = 706)                                         

 

             ALKALINE PHOSPHATASE (BEAKER) (test 109 U/L                  

        



             code = 346)                                         

 

             AST (SGOT) (BEAKER) (test code = 54 U/L       5-34         H       

     



             353)                                                

 

             ALT (SGPT) (BEAKER) (test code = 24 U/L       6-55                 

     



             347)                                                



 ID Dre TOPETE FSpecimen moderately ictericPROTHROMBIN TIME/INR
2021 12:01:00





             Test Item    Value        Reference Range Interpretation Comments

 

             PROTIME (BEAKER) 24.8 seconds 11.9-14.2    H            



             (test code = 759)                                        

 

             INR (BEAKER) (test 2.31         See_Comment                [Automat

ed message]



             code = 370)                                         The system [employer]



                                                                 generated this 

result



                                                                 transmitted ref

erence



                                                                 range: <=5.90. 

The



                                                                 reference range

 was



                                                                 not used to int

erpret



                                                                 this result as



                                                                 normal/abnormal

.



Effective 2019: PT Reference Range ChangeNew: 11.9-14.2 Previous: 11.7-
14.7RECOMMENDED COUMADIN/WARFARIN INR THERAPY RANGESSTANDARD DOSE: 2.0-3.0 
Includes: PROPHYLAXIS for venous thrombosis, systemic embolization; TREATMENT 
for venous thrombosis and/or pulmonary embolus.HIGH RISK: Target INR is 2.5-3.5 
for patients wiht mechanical heart valves.CBC W/PLT COUNT &amp; AUTO 
[id number] 11:44:00





             Test Item    Value        Reference Range Interpretation Comments

 

             WHITE BLOOD CELL COUNT (BEAKER) 3.7 K/ L     3.5-10.5              

    



             (test code = 775)                                        

 

             RED BLOOD CELL COUNT (BEAKER) 3.30 M/ L    4.63-6.08    L          

  



             (test code = 761)                                        

 

             HEMOGLOBIN (BEAKER) (test code = 11.6 GM/DL   13.7-17.5    L       

     



             410)                                                

 

             HEMATOCRIT (BEAKER) (test code = 34.8 %       40.1-51.0    L       

     



             411)                                                

 

             MEAN CORPUSCULAR VOLUME (BEAKER) 105.5 fL     79.0-92.2    H       

     



             (test code = 753)                                        

 

             MEAN CORPUSCULAR HEMOGLOBIN 35.2 pg      25.7-32.2    H            



             (BEAKER) (test code = 751)                                        

 

             MEAN CORPUSCULAR HEMOGLOBIN CONC 33.3 GM/DL   32.3-36.5            

     



             (BEAKER) (test code = 752)                                        

 

             RED CELL DISTRIBUTION WIDTH 13.9 %       11.6-14.4                 



             (BEAKER) (test code = 412)                                        

 

             PLATELET COUNT (BEAKER) (test code 83 K/CU MM   150-450      L     

       



             = 756)                                              

 

             MEAN PLATELET VOLUME (BEAKER) 12.1 fL      9.4-12.4                

  



             (test code = 754)                                        

 

             NUCLEATED RED BLOOD CELLS (BEAKER) 0 /100 WBC   0-0                

       



             (test code = 413)                                        

 

             NEUTROPHILS RELATIVE PERCENT 46 %                                  

 



             (BEAKER) (test code = 429)                                        

 

             LYMPHOCYTES RELATIVE PERCENT 34 %                                  

 



             (BEAKER) (test code = 430)                                        

 

             MONOCYTES RELATIVE PERCENT 14 %                                   



             (BEAKER) (test code = 431)                                        

 

             EOSINOPHILS RELATIVE PERCENT 5 %                                   

 



             (BEAKER) (test code = 432)                                        

 

             BASOPHILS RELATIVE PERCENT 1 %                                    



             (BEAKER) (test code = 437)                                        

 

             NEUTROPHILS ABSOLUTE COUNT 1.69 K/ L    1.78-5.38    L            



             (BEAKER) (test code = 670)                                        

 

             LYMPHOCYTES ABSOLUTE COUNT 1.24 K/ L    1.32-3.57    L            



             (BEAKER) (test code = 414)                                        

 

             MONOCYTES ABSOLUTE COUNT (BEAKER) 0.53 K/ L    0.30-0.82           

      



             (test code = 415)                                        

 

             EOSINOPHILS ABSOLUTE COUNT 0.20 K/ L    0.04-0.54                 



             (BEAKER) (test code = 416)                                        

 

             BASOPHILS ABSOLUTE COUNT (BEAKER) 0.03 K/ L    0.01-0.08           

      



             (test code = 417)                                        

 

             IMMATURE GRANULOCYTES-RELATIVE 0 %          0-1                    

   



             PERCENT (BEAKER) (test code =                                      

  



             2801)                                               



U/S, [id number] 23:08:00Referring: Dr. Qiana Liu Administer 
200 mL of albumin 25% (50 grams) IV x1 after paracentesis if 3 or more liters 
removed. Send ascitic fluid for cell count and differential. Administer 200 mL 
ofalbumin 25% (50 grams) IV x1 after paracentesis if 3 or more liters removed. 
Send ascitic fluid for cell count and differential. Labs to be ordered:-
&gt;Other (please add comment) Reason for Exam:-&gt;ascites, portal 
hypertension, cirrhosis
************************************************************[facility] 1970 Sex: 
M************************************************************FINAL REPORT 
PATIENT ID: [id number] Ultrasound guided paracentesis. Clinical History: Ascites. 
Sedation: None. : Denisa Tellez PA-C Assistant: None. Estimated
Blood Loss: &lt; 1 cc. Specimen: 4200 cc of clear yellow fluid, samples sent to 
laboratory. Technique: Informed consent was obtained. The risks of pain, 
bleeding, infection, bowel perforation, injury to adjacent structures, and adv
erse medication reactions were discussed with the patient. After informed 
consent was obtained, the patient's abdomen was scanned. The right upper 
quadrant of the abdomen was selected for paracentesis.After the largest fluid 
pocket area was marked, and the anterior abdominal wall was evaluated with color
Doppler to exclude presence of blood vessels traversing the area, the skin was 
prepped and draped in the usual sterile manner. After local anesthesia was 
achieved with 2% lidocaine, a 5 [place] one-step catheter was advanced into the 
peritoneal cavity under ultrasound guidance. After completion of drainage, the 
catheter was removed. There was no evidence of complication. 
Impression:Successful ultrasound guided paracentesis. Signed: Latasha Santos 
MDReport Verified Date/Time: 2021 23:08:28 Reading Location: [facility] B1 P006J 
Ultrasound Reading Room Electronically signed by: LATASHA SANTOS MD on 
2021 11:08 PMSARS-COV2/RT-PCR ([facility]; REF LAB[facility])2021 20:09:00





             Test Item    Value        Reference Range Interpretation Comments

 

             SARS-COV2/RT-PCR (test Negative     Not Detected, Negative,        

      



             code = 2098483)              See external report for              



                                       linked test               

 

             SARS-COV-2 PERFORMING LAB [facility]                             



             (test code = 7759583)                                        



Negative result for this test determines that SARS-CoV-2 RNA was not present in 
the specimen above the Limit of Detection (LOD). However, Negative results do 
not preclude SARS-CoV-2 infection and should not be used as the sole basis for 
treatment or patient management decisions. Negative results must be combined 
with clinical observations, patient history, and epidemiological information. A 
false negative result may occur if a specimen is improperly collected, 
transported or handled. A false negative result should be considered if 
patient's recent exposures or clinical presentation indicate that COVID-19 
(SARS-CoV-2) is likely and diagnostic tests for other causes of illness are 
negative. Re-testing should be considered in cases of suspected false 
negatives.The limit of detection for this assay is 800 copies/mL.This SARS CoV-2
test is a real-time RT-PCR test intended for the qualitative detection of 
nucleic acid from SARS-CoV-2 in a nasopharyngeal swab specimen collected from 
individuals suspected of COVID-19 by their healthcare provider.This test has not
been Food and Drug Administration (FDA) cleared or approved. This is a modified 
version of an approved Emergency Use Authorization (EUA) and is in the process 
of review by the FDA. Once authorized by the FDA, the issued EUA will be 
effective until the declaration that circumstances exist justifying the 
authorization of the emergency use ofin vitro diagnostic tests for detection 
and/or diagnosis of COVID-19 is terminated under Section 564(b)(2) of the Act or
the EUA is revoked under Section 564(g) of the Act.Fact Sheet for Healthcare 
Prov
iders:https://www.[employer]/sites/default/files/product/documents/Fact_Sheet_HC

_Lmmjrtgik_Swnc_XXXP-BjE-8.pdfFact Sheet for Healthcare 
Patients:https://www.[employer]/sites/default/files/product/docume
nts/Rbhk_Qxnmh_Vmvydtdy_Exxu_JUQH-WtL-8.pdfPerforming Laboratory:[facility], [place] [address]GUCB FLUID CELL COUNT 
WITH [id number] 11:53:00





             Test Item    Value        Reference Range Interpretation Comments

 

             APPEARANCE FLUID (BEAKER) (test Hazy         Clear        A        

    



             code = 510)                                         

 

             COLOR FLUID (BEAKER) (test code Yellow       Colorless, Straw A    

        



             = 511)                                              

 

             RBC FLUID (BEAKER) (test code = 490 /cu mm   <=1          H        

    



             513)                                                

 

             ADJUSTED WBC FLUID (BEAKER) 474 /cu mm   <=5          H            



             (test code = 1691)                                        

 

             LINING CELLS (BEAKER) (test 28 /cu mm    <=1          H            



             code = 1590)                                        

 

             NEUTROPHILS FLUID (BEAKER) 10 %                                   



             (test code = 1656)                                        

 

             LYMPHS FLUID (BEAKER) (test 55 %                                   



             code = 488)                                         

 

             MONO/MACROPHAGE FLUID (BEAKER) 35 %                                

   



             (test code = 489)                                        

 

             EOSINOPHILS FLUID (BEAKER) 0 %                                    



             (test code = 491)                                        

 

             BASO FLUID (BEAKER) (test code 0 %                                 

   



             = 492)                                              

 

             CONTAINER BODY FLUID (BEAKER) Sterile Vial                         

  



             (test code = 2873)                                        



BLOOD [id number] 21:00:00





             Test Item    Value        Reference Range Interpretation Comments

 

             CULTURE (BEAKER) (test No growth in 5 days                         

  



             code = 1095)                                        



BLOOD [id number] 21:00:00





             Test Item    Value        Reference Range Interpretation Comments

 

             CULTURE (BEAKER) (test No growth in 5 days                         

  



             code = 1095)                                        



TISSUE [id number] 18:03:00Surgical Pathology Report Case: [id number] 
Authorizing Provider: Cathryn Frankel MD Collected: 2021 08:47 AM 
Ordering Location: [address] Received: 2021 08:23 AM Service 
Pathologist: Geronimo Garrison MD  Specimen: Polyp, Colon - Left/Descending, x3 
COLON, LEFT/DESCENDING COLON POLYPS X 3, POLYPECTOMY-  TUBULAR ADENOMA, 
FRAGMENTS OF- HIGH GRADE DYSPLASIA OR CARCINOMA NOT SEEN Signing Pathologist 
Direct Phone Line: [phone] signed by Geronimo Garrison MD on  at 6:03 [id number] CANCER SCREENINGDescending colonReceived in 
formalin labeled the patient's name, accession number and "descending colon 
polyp" are 3 tan-pink tissue fragments measuring up to 0.2 cm in greatest 
dimension which are filtered and submitted in toto in A1.RAISSA Ha, HT 
(ASCP)Performed.[facility], Department of Pathology, [address] [place] [address], Tel [phone] [facility], Department of Pathology, [address] [place] [address], Tel 
[phone] [facility], Department of Pathology, [address] [place] [address], Tel [phone], CTA CORONARY WITH CALCIUM
EVAL AND FFR [id number] 15:35:00Referring: Dr. Qiana Liu
************************************************************[facility] : 1970 Sex: 
M************************************************************Addendum 
BeginsREPORT STATUS:A PATIENT ID: [id number] I agree with the nonvascular findings
with exceptions and emphasis as below:*Cirrhotic liver with a TIPS and small 
volume upper abdominal ascites.*Trace bilateral pleural effusions with 
subsegmental atelectasis. Signed: Aaron Patino MDReport Verified Date/Time: 
2021 15:35:10 Addendum EndsFINAL REPORT PATIENT ID: [id number] CTA Aorta - 
chest with coronary calcium score and coronary CTA:  2021 11:01 PM. 
Comparison: CT chest dated 2021. Clinical History: 51 years old Male now 
referred for evaluation of coronary calcium score and for evaluation of coronary
artery anatomy and possible stenoses. Study was performed in part in order to 
avoid an invasive procedure.. Technique: Multidetector CT scanner. Images were 
obtained before and during the dynamic passage of intravenous contrast material 
with retrospective ECG gating. 3D volume-rendering and multiplanar 
reconstructions were performed interactively by the interpreting physician using
anindependent ([employer]) workstation. Please refer to the contrast sheet scanned 
in the EPIC system for the amount and route of contrast given. This exam was 
performed according to our departmental dose-optimisation programme, which 
includes automated exposure control, adjustment of the mA and/or kV according to
patient size and/or use of iterative reconstruction technique. Dose modulation, 
iterative reconstruction, and/or weight based adjustment of the mA/kV was 
utilized to reduce the radiation dose toas low as reasonably achievable. 
Findings: The visualized thyroid gland appears normal. The chest wall, 
mediastinum, and pericardium are unremarkable. The pulmonary arteries appear 
normal. No significant adenopathy is identified in the axilla, mediastinum, and 
shani. Lung windows reveal no acute abnormalities, except for mild bibasilar 
atelectasis. There is no pulmonary parenchymal mass, infiltrate, or pleural 
effusion. The cardiac chambers demonstrate normal atrioventricular and 
ventriculoarterial concordance, and systemic and pulmonary venous return. The 
cardiac chamber sizes are notable for mild biatrial enlargement. The aortic 
valve is trileaflet, and free from calcifications. The ascending thoracic aorta 
and aortic arch is normal in course, caliber, and contour. The arch vessel 
branching pattern is normal. The imaged arch branch vessels are patent 
proximally. There is no acute aortic pathology, such as dissection, intramural 
hematoma, or contained rupture. Calcium score and high-resolution,ECG 
synchronized computed tomography of the heart with attention to the coronary 
arteries was performed. Coronary calcification was analyzed using the [employer] 
system software. These are the results of the calcificate evaluation (threshold 
= 130 HU): LM: Agatston = 0Volume = 0LAD:Agatston = 182Volume = 151LCX:Agatston 
= 40Volume = 48RCA:Agatston = 278Volume = 
271*****************************************
***********************************Total:Agatston = 500Volume = 470 Reference 
ranges for calcium scores are provided as follows ([facility]enson Clin Proc 1999; 74: 243-
252): 0-10: minimal [id number]:mild [id number] calcium&gt; 
400significant calcium This calcium score places the patient is &gt; 95th 
percentile for age group. Coronary CT angiogram was performed using standard 
methodology and images analyzed using [employer] software package. CORONARY ANATOMY:
Origins: Normal coronary artery origins.Left Main Coronary Artery: The LM is a 
normal sized vessel that bifurcates into the LAD and LCx. There is no 
significant atherosclerotic change or stenotic disease. Left Anterior Descending
Coronary Artery: The LAD is a normal sized vessel that wraps around the apex. It
gives rise to 2 acute diagonal branches. There is mild to moderate eccentric 
calcification involving the proximal to mid LAD and focal calcification at the 
distal LAD. There is mild (25 -49%)luminal stenosis involving the ostium of the 
LAD with mixed calcific atherosclerotic changes, otherwise the remaining LAD is 
widely patent withminimal luminal irregularities. Left Circumflex Coronary 
Artery: The LCX is a normal sized vessel, which is non-dominant. It gives rise 
to 2 obtuse marginal branches. There is mild focal eccentric calcification at 
the mid circumflex, otherwise there is no significant luminal stenosis noted. 
Right Coronary Artery: The RCA is a normal sized vessel, which is dominant. It 
gives rise to a small conus, basim SA tatyana branch, and 1 acute marginal 
branches. In its distal segment it bifurcates into a PDA andRPL branch. There is
scattered focal calcification with luminal irregularities noted, however there i
s no significant luminal stenosis visualized. ABDOMEN:The limited images of the 
upper abdomen revealliver cirrhosis with evidence of TIPS. Conclusions: 1. 
Quantitative coronary artery calcium Agaston score of 500 and volume score of 
470. This calcium score places the patient is &gt; 95th percentile for age 
group. Coronary arteries have normal origins, and normal branching patterns. The
right coronary artery is dominant. 2. There is mild to moderate eccentric 
calcification involving the proximal to mid LAD and focal calcification at the 
distal LAD. There is mild (25 -49%) luminal stenosis involvingthe ostium of the 
LAD with mixed calcific atherosclerotic changes, otherwise the remaining LAD is 
widely patent with minimal luminal irregularities 3. The left circumflex, and 
right coronary arteries are all widely patent with scattered eccentric mild 
calcification with no significant luminal stenosis. 4. The imaged thoracic aorta
is normal in course, caliber, and contour. 5. Cirrhotic liver with evidence of 
TIPS. An addendum will be dictated regarding the non-vascular findings by the 
Consultant Radiologist. Signed: Jericho Coopereport Verified Date/Time: 
2021 15:08:54 Reading Location:[place] [address] CT Reading Room Electronically 
signed by: AARON PATINO MD on 2021 03:35 PMT SPOT [id number] 
11:53:00





             Test Item    Value        Reference Range Interpretation Comments

 

             T-SPOT TB (BEAKER) (test code = Negative                           

    



             1683)                                               

 

             NEG CONTROL SPOT COUNT (BEAKER) 0                                  

    



             (test code = 1684)                                        

 

             PANEL A SPOT (BEAKER) (test code = 0                               

       



             1685)                                               

 

             PANEL B SPOT (BEAKER) (test code = 0                               

       



             1686)                                               

 

             POS CONTROL SPOT CT (BEAKER) (test 0                               

       



             code = 1687)                                        

 

             SCAN RESULT (test code = 9858758)                                  

      



[id number] 11:38:00





             Test Item    Value        Reference Range Interpretation Comments

 

             T3 TOTAL (BEAKER) (test code = 656) 64 ng/dL                 

        



Reference Range  ng/dLCBC W/PLT COUNT &amp; AUTO [id number] 
05:18:00





             Test Item    Value        Reference Range Interpretation Comments

 

             WHITE BLOOD CELL COUNT 4.1 K/ L     3.5-10.5                  



             (BEAKER) (test code =                                        



             775)                                                

 

             RED BLOOD CELL COUNT 2.95 M/ L    4.63-6.08    L            



             (BEAKER) (test code =                                        



             761)                                                

 

             HEMOGLOBIN (BEAKER) 10.7 GM/DL   13.7-17.5    L            



             (test code = 410)                                        

 

             HEMATOCRIT (BEAKER) 30.7 %       40.1-51.0    L            



             (test code = 411)                                        

 

             MEAN CORPUSCULAR 104.1 fL     79.0-92.2    H            Discordant 

MCV



             VOLUME (BEAKER) (test                                        result

s compared to



             code = 753)                                         previous result

s;



                                                                 clinical correl

ation



                                                                 required.

 

             MEAN CORPUSCULAR 36.3 pg      25.7-32.2    H            



             HEMOGLOBIN (BEAKER)                                        



             (test code = 751)                                        

 

             MEAN CORPUSCULAR 34.9 GM/DL   32.3-36.5                 



             HEMOGLOBIN CONC                                        



             (BEAKER) (test code =                                        



             752)                                                

 

             RED CELL DISTRIBUTION 14.4 %       11.6-14.4                 



             WIDTH (BEAKER) (test                                        



             code = 412)                                         

 

             PLATELET COUNT 111 K/CU MM  150-450      L            



             (BEAKER) (test code =                                        



             756)                                                

 

             MEAN PLATELET VOLUME 11.9 fL      9.4-12.4                  



             (BEAKER) (test code =                                        



             754)                                                

 

             NUCLEATED RED BLOOD 0 /100 WBC   0-0                       



             CELLS (BEAKER) (test                                        



             code = 413)                                         

 

             NEUTROPHILS RELATIVE 50 %                                   



             PERCENT (BEAKER) (test                                        



             code = 429)                                         

 

             LYMPHOCYTES RELATIVE 28 %                                   



             PERCENT (BEAKER) (test                                        



             code = 430)                                         

 

             MONOCYTES RELATIVE 12 %                                   



             PERCENT (BEAKER) (test                                        



             code = 431)                                         

 

             EOSINOPHILS RELATIVE 9 %                                    



             PERCENT (BEAKER) (test                                        



             code = 432)                                         

 

             BASOPHILS RELATIVE 1 %                                    



             PERCENT (BEAKER) (test                                        



             code = 437)                                         

 

             NEUTROPHILS ABSOLUTE 2.04 K/ L    1.78-5.38                 



             COUNT (BEAKER) (test                                        



             code = 670)                                         

 

             LYMPHOCYTES ABSOLUTE 1.16 K/ L    1.32-3.57    L            



             COUNT (BEAKER) (test                                        



             code = 414)                                         

 

             MONOCYTES ABSOLUTE 0.50 K/ L    0.30-0.82                 



             COUNT (BEAKER) (test                                        



             code = 415)                                         

 

             EOSINOPHILS ABSOLUTE 0.37 K/ L    0.04-0.54                 



             COUNT (BEAKER) (test                                        



             code = 416)                                         

 

             BASOPHILS ABSOLUTE 0.04 K/ L    0.01-0.08                 



             COUNT (BEAKER) (test                                        



             code = 417)                                         

 

             IMMATURE     0 %          0-1                       



             GRANULOCYTES-RELATIVE                                        



             PERCENT (BEAKER) (test                                        



             code = 2801)                                        



COMPREHENSIVE METABOLIC [id number] 05:10:00





             Test Item    Value        Reference Range Interpretation Comments

 

             TOTAL PROTEIN 5.5 gm/dL    6.0-8.3      L            



             (BEAKER) (test code =                                        



             770)                                                

 

             ALBUMIN (BEAKER) 1.7 g/dL     3.5-5.0      L            



             (test code = 1145)                                        

 

             ALKALINE PHOSPHATASE 101 U/L                          



             (BEAKER) (test code =                                        



             346)                                                

 

             BILIRUBIN TOTAL 4.9 mg/dL    0.2-1.2      H            



             (BEAKER) (test code =                                        



             377)                                                

 

             SODIUM (BEAKER) (test 134 meq/L    136-145      L            



             code = 381)                                         

 

             POTASSIUM (BEAKER) 3.5 meq/L    3.5-5.1                   



             (test code = 379)                                        

 

             CHLORIDE (BEAKER) 105 meq/L                        



             (test code = 382)                                        

 

             CO2 (BEAKER) (test 24 meq/L     22-29                     



             code = 355)                                         

 

             BLOOD UREA NITROGEN 6 mg/dL      7-21         L            



             (BEAKER) (test code =                                        



             354)                                                

 

             CREATININE (BEAKER) 0.72 mg/dL   0.57-1.25                 



             (test code = 358)                                        

 

             GLUCOSE RANDOM 100 mg/dL                        



             (BEAKER) (test code =                                        



             652)                                                

 

             CALCIUM (BEAKER) 7.0 mg/dL    8.4-10.2     L            



             (test code = 697)                                        

 

             AST (SGOT) (BEAKER) 73 U/L       5-34         H            



             (test code = 353)                                        

 

             ALT (SGPT) (BEAKER) 40 U/L       6-55                      



             (test code = 347)                                        

 

             EGFR (BEAKER) (test 115                                    ESTIMATE

D GFR IS



             code = 1092) mL/min/1.73 sq                           NOT AS ACCURA

TE AS



                          m                                      CREATININE



                                                                 CLEARANCE IN



                                                                 PREDICTING



                                                                 GLOMERULAR



                                                                 FILTRATION RATE

.



                                                                 ESTIMATED GFR I

S



                                                                 NOT APPLICABLE 

FOR



                                                                 DIALYSIS PATIEN

TS.



 ID - EDASISpecimen moderately ictericPROTHROMBIN TIME/[id number] 
04:53:00





             Test Item    Value        Reference Range Interpretation Comments

 

             PROTIME (BEAKER) (test code = 25.8 seconds 11.9-14.2    H          

  



             759)                                                

 

             INR (BEAKER) (test code = 370) 2.45         <=5.90                 

   



Effective 2019: PT Reference Range ChangeNew: 11.9-14.2 Previous: 11.7-
14.7RECOMMENDED COUMADIN/WARFARIN INR THERAPY RANGESSTANDARD DOSE: 2.0-3.0 
Includes: PROPHYLAXIS for venous thrombosis, systemic embolization; TREATMENT 
for venous thrombosis and/or pulmonary embolus.HIGH RISK: Target INR is 2.5-3.5 
for patients wiht mechanical heart valves.COMPREHENSIVE METABOLIC PANEL
2021 06:06:00





             Test Item    Value        Reference Range Interpretation Comments

 

             TOTAL PROTEIN 6.2 gm/dL    6.0-8.3                   



             (BEAKER) (test code =                                        



             770)                                                

 

             ALBUMIN (BEAKER) 1.8 g/dL     3.5-5.0      L            



             (test code = 1145)                                        

 

             ALKALINE PHOSPHATASE 112 U/L                          



             (BEAKER) (test code =                                        



             346)                                                

 

             BILIRUBIN TOTAL 5.6 mg/dL    0.2-1.2      H            



             (BEAKER) (test code =                                        



             377)                                                

 

             SODIUM (BEAKER) (test 132 meq/L    136-145      L            



             code = 381)                                         

 

             POTASSIUM (BEAKER) 3.8 meq/L    3.5-5.1                   



             (test code = 379)                                        

 

             CHLORIDE (BEAKER) 104 meq/L                        



             (test code = 382)                                        

 

             CO2 (BEAKER) (test 24 meq/L     22-29                     



             code = 355)                                         

 

             BLOOD UREA NITROGEN 9 mg/dL      7-21                      



             (BEAKER) (test code =                                        



             354)                                                

 

             CREATININE (BEAKER) 0.81 mg/dL   0.57-1.25                 



             (test code = 358)                                        

 

             GLUCOSE RANDOM 102 mg/dL                        



             (BEAKER) (test code =                                        



             652)                                                

 

             CALCIUM (BEAKER) 7.2 mg/dL    8.4-10.2     L            



             (test code = 697)                                        

 

             AST (SGOT) (BEAKER) 88 U/L       5-34         H            



             (test code = 353)                                        

 

             ALT (SGPT) (BEAKER) 47 U/L       6-55                      



             (test code = 347)                                        

 

             EGFR (BEAKER) (test 100                                    ESTIMATE

D GFR IS



             code = 1092) mL/min/1.73 sq                           NOT AS ACCURA

TE AS



                          m                                      CREATININE



                                                                 CLEARANCE IN



                                                                 PREDICTING



                                                                 GLOMERULAR



                                                                 FILTRATION RATE

.



                                                                 ESTIMATED GFR I

S



                                                                 NOT APPLICABLE 

FOR



                                                                 DIALYSIS PATIEN

TS.



 ID - EDASISpecimen moderately ictericCBC W/PLT COUNT &amp; AUTO 
[id number] 05:40:00





             Test Item    Value        Reference Range Interpretation Comments

 

             WHITE BLOOD CELL COUNT (BEAKER) 6.2 K/ L     3.5-10.5              

    



             (test code = 775)                                        

 

             RED BLOOD CELL COUNT (BEAKER) 3.33 M/ L    4.63-6.08    L          

  



             (test code = 761)                                        

 

             HEMOGLOBIN (BEAKER) (test code = 12.0 GM/DL   13.7-17.5    L       

     



             410)                                                

 

             HEMATOCRIT (BEAKER) (test code = 36.0 %       40.1-51.0    L       

     



             411)                                                

 

             MEAN CORPUSCULAR VOLUME (BEAKER) 108.1 fL     79.0-92.2    H       

     



             (test code = 753)                                        

 

             MEAN CORPUSCULAR HEMOGLOBIN 36.0 pg      25.7-32.2    H            



             (BEAKER) (test code = 751)                                        

 

             MEAN CORPUSCULAR HEMOGLOBIN CONC 33.3 GM/DL   32.3-36.5            

     



             (BEAKER) (test code = 752)                                        

 

             RED CELL DISTRIBUTION WIDTH 14.6 %       11.6-14.4    H            



             (BEAKER) (test code = 412)                                        

 

             PLATELET COUNT (BEAKER) (test 128 K/CU MM  150-450      L          

  



             code = 756)                                         

 

             MEAN PLATELET VOLUME (BEAKER) 11.7 fL      9.4-12.4                

  



             (test code = 754)                                        

 

             NUCLEATED RED BLOOD CELLS 0 /100 WBC   0-0                       



             (BEAKER) (test code = 413)                                        

 

             NEUTROPHILS RELATIVE PERCENT 57 %                                  

 



             (BEAKER) (test code = 429)                                        

 

             LYMPHOCYTES RELATIVE PERCENT 21 %                                  

 



             (BEAKER) (test code = 430)                                        

 

             MONOCYTES RELATIVE PERCENT 10 %                                   



             (BEAKER) (test code = 431)                                        

 

             EOSINOPHILS RELATIVE PERCENT 10 %                                  

 



             (BEAKER) (test code = 432)                                        

 

             BASOPHILS RELATIVE PERCENT 1 %                                    



             (BEAKER) (test code = 437)                                        

 

             NEUTROPHILS ABSOLUTE COUNT 3.48 K/ L    1.78-5.38                 



             (BEAKER) (test code = 670)                                        

 

             LYMPHOCYTES ABSOLUTE COUNT 1.31 K/ L    1.32-3.57    L            



             (BEAKER) (test code = 414)                                        

 

             MONOCYTES ABSOLUTE COUNT (BEAKER) 0.64 K/ L    0.30-0.82           

      



             (test code = 415)                                        

 

             EOSINOPHILS ABSOLUTE COUNT 0.63 K/ L    0.04-0.54    H            



             (BEAKER) (test code = 416)                                        

 

             BASOPHILS ABSOLUTE COUNT (BEAKER) 0.07 K/ L    0.01-0.08           

      



             (test code = 417)                                        

 

             IMMATURE GRANULOCYTES-RELATIVE 1 %          0-1                    

   



             PERCENT (BEAKER) (test code =                                      

  



             2801)                                               



PROTHROMBIN TIME/[id number] 05:23:00





             Test Item    Value        Reference Range Interpretation Comments

 

             PROTIME (BEAKER) (test code = 24.0 seconds 11.9-14.2    H          

  



             759)                                                

 

             INR (BEAKER) (test code = 370) 2.21         <=5.90                 

   



Effective 2019: PT Reference Range ChangeNew: 11.9-14.2 Previous: 11.7-
14.7RECOMMENDED COUMADIN/WARFARIN INR THERAPY RANGESSTANDARD DOSE: 2.0-3.0 
Includes: PROPHYLAXIS for venous thrombosis, systemic embolization; TREATMENT 
for venous thrombosis and/or pulmonary embolus.HIGH RISK: Target INR is 2.5-3.5 
for patients wiht mechanical heart valves.COMPREHENSIVE METABOLIC PANEL
2021 06:43:00





             Test Item    Value        Reference Range Interpretation Comments

 

             TOTAL PROTEIN 5.7 gm/dL    6.0-8.3      L            



             (BEAKER) (test code =                                        



             770)                                                

 

             ALBUMIN (BEAKER) 1.7 g/dL     3.5-5.0      L            



             (test code = 1145)                                        

 

             ALKALINE PHOSPHATASE 77 U/L                           



             (BEAKER) (test code =                                        



             346)                                                

 

             BILIRUBIN TOTAL 6.5 mg/dL    0.2-1.2      H            



             (BEAKER) (test code =                                        



             377)                                                

 

             SODIUM (BEAKER) (test 134 meq/L    136-145      L            



             code = 381)                                         

 

             POTASSIUM (BEAKER) 3.7 meq/L    3.5-5.1                   



             (test code = 379)                                        

 

             CHLORIDE (BEAKER) 106 meq/L                        



             (test code = 382)                                        

 

             CO2 (BEAKER) (test 23 meq/L     22-29                     



             code = 355)                                         

 

             BLOOD UREA NITROGEN 8 mg/dL      7-21                      



             (BEAKER) (test code =                                        



             354)                                                

 

             CREATININE (BEAKER) 0.70 mg/dL   0.57-1.25                 



             (test code = 358)                                        

 

             GLUCOSE RANDOM 82 mg/dL                         



             (BEAKER) (test code =                                        



             652)                                                

 

             CALCIUM (BEAKER) 7.1 mg/dL    8.4-10.2     L            



             (test code = 697)                                        

 

             AST (SGOT) (BEAKER) 85 U/L       5-34         H            



             (test code = 353)                                        

 

             ALT (SGPT) (BEAKER) 44 U/L       6-55                      



             (test code = 347)                                        

 

             EGFR (BEAKER) (test 119                                    ESTIMATE

D GFR IS



             code = 1092) mL/min/1.73 sq                           NOT AS ACCURA

TE AS



                          m                                      CREATININE



                                                                 CLEARANCE IN



                                                                 PREDICTING



                                                                 GLOMERULAR



                                                                 FILTRATION RATE

.



                                                                 ESTIMATED GFR I

S



                                                                 NOT APPLICABLE 

FOR



                                                                 DIALYSIS PATIEN

TS.



 ID - ADMINSpecimen moderately ictericPROTHROMBIN TIME/[id number] 
06:38:00





             Test Item    Value        Reference Range Interpretation Comments

 

             PROTIME (BEAKER) (test code = 24.9 seconds 11.9-14.2    H          

  



             759)                                                

 

             INR (BEAKER) (test code = 370) 2.32         <=5.90                 

   



Effective 2019: PT Reference Range ChangeNew: 11.9-14.2 Previous: 11.7-
14.7RECOMMENDED COUMADIN/WARFARIN INR THERAPY RANGESSTANDARD DOSE: 2.0-3.0 
Includes: PROPHYLAXIS for venous thrombosis, systemic embolization; TREATMENT 
for venous thrombosis and/or pulmonary embolus.HIGH RISK: Target INR is 2.5-3.5 
for patients wiht mechanical heart valves.CBC W/PLT COUNT &amp; AUTO 
[id number] 06:14:00





             Test Item    Value        Reference Range Interpretation Comments

 

             WHITE BLOOD CELL COUNT (BEAKER) 4.0 K/ L     3.5-10.5              

    



             (test code = 775)                                        

 

             RED BLOOD CELL COUNT (BEAKER) 2.93 M/ L    4.63-6.08    L          

  



             (test code = 761)                                        

 

             HEMOGLOBIN (BEAKER) (test code = 10.6 GM/DL   13.7-17.5    L       

     



             410)                                                

 

             HEMATOCRIT (BEAKER) (test code = 31.4 %       40.1-51.0    L       

     



             411)                                                

 

             MEAN CORPUSCULAR VOLUME (BEAKER) 107.2 fL     79.0-92.2    H       

     



             (test code = 753)                                        

 

             MEAN CORPUSCULAR HEMOGLOBIN 36.2 pg      25.7-32.2    H            



             (BEAKER) (test code = 751)                                        

 

             MEAN CORPUSCULAR HEMOGLOBIN CONC 33.8 GM/DL   32.3-36.5            

     



             (BEAKER) (test code = 752)                                        

 

             RED CELL DISTRIBUTION WIDTH 14.5 %       11.6-14.4    H            



             (BEAKER) (test code = 412)                                        

 

             PLATELET COUNT (BEAKER) (test 105 K/CU MM  150-450      L          

  



             code = 756)                                         

 

             MEAN PLATELET VOLUME (BEAKER) 11.8 fL      9.4-12.4                

  



             (test code = 754)                                        

 

             NUCLEATED RED BLOOD CELLS 0 /100 WBC   0-0                       



             (BEAKER) (test code = 413)                                        

 

             NEUTROPHILS RELATIVE PERCENT 54 %                                  

 



             (BEAKER) (test code = 429)                                        

 

             LYMPHOCYTES RELATIVE PERCENT 22 %                                  

 



             (BEAKER) (test code = 430)                                        

 

             MONOCYTES RELATIVE PERCENT 14 %                                   



             (BEAKER) (test code = 431)                                        

 

             EOSINOPHILS RELATIVE PERCENT 9 %                                   

 



             (BEAKER) (test code = 432)                                        

 

             BASOPHILS RELATIVE PERCENT 1 %                                    



             (BEAKER) (test code = 437)                                        

 

             NEUTROPHILS ABSOLUTE COUNT 2.17 K/ L    1.78-5.38                 



             (BEAKER) (test code = 670)                                        

 

             LYMPHOCYTES ABSOLUTE COUNT 0.88 K/ L    1.32-3.57    L            



             (BEAKER) (test code = 414)                                        

 

             MONOCYTES ABSOLUTE COUNT (BEAKER) 0.55 K/ L    0.30-0.82           

      



             (test code = 415)                                        

 

             EOSINOPHILS ABSOLUTE COUNT 0.36 K/ L    0.04-0.54                 



             (BEAKER) (test code = 416)                                        

 

             BASOPHILS ABSOLUTE COUNT (BEAKER) 0.05 K/ L    0.01-0.08           

      



             (test code = 417)                                        

 

             IMMATURE GRANULOCYTES-RELATIVE 1 %          0-1                    

   



             PERCENT (BEAKER) (test code =                                      

  



             2801)                                               



DELL CUEVA [id number] 17:05:00Referring: Dr. Qiana Liu Reason for 
exam:-&gt;Eval TIPS stenosis
************************************************************[facility]  : 1970 Sex: 
M************************************************************FINAL REPORT 
PATIENT ID: [id number] History: Cirrhosis, portal hypertension, decreased 
velocities within indwelling TIPS concerning for stenosis. Modality: Sonography 
and fluoroscopy. Sedation: Moderate sedation was administered. 1.5 mg of Versed 
and 75 mcg of fentanyl IV was used for moderate sedation monitored under my 
direction. Total intra-service time of sedation was 45 minutes. The patient's 
vital signs were monitored throughout the procedure and recorded in the 
patient's medical record by the nurse. : Dheeraj Durán MD. 
Assistant: None. Approach: Right internal jugular vein Estimated blood loss: 
&lt; 5 cc. Specimen: None. Fluoroscopy Time: 6.0 min.Reference Air Kerma (Ka, 
r): 184.4 mGy. Technique: Informed written consent was obtained. Discussion of 
risks, benefits, and alternatives were made with the patient. The patient 
expressed understanding and agreed to proceed. A universal timeout was performed
prior to starting the procedure. All elements maximal sterile barrier technique 
was utilized for this procedure, including utilization of sterile scrub solution
for skin prep,a large sterile sheet to cover the areas of the patient that were 
not prepped, and hand hygiene, mask, head covering, and sterile gown for 
performing radiologist and scrub technologist. Initial ultrasound images 
demonstrate patent right internal jugular vein. Using ultrasound guidance, 
following acquisition of permanent images, the right internal jugular vein was 
accessed using a 21-gauge micropuncture needle. A 0.018 inch wire was advanced 
into the SVC. The needle was exchanged for a 4 [place] micropuncture sheath. The 
micropuncture sheath was exchanged over a 0.035 Bentson wire for a 7 [place] x 10
cm vascular sheath. Using a 5 [place] H1 catheter and 0.035 angled Glidewire, the
TIPS shunt was cannulated and catheter manipulated into the main portal vein. A 
portogram was performed and portosystemic pressure measurements were obtained. 
The stenosis within the hepatic venous end of the stent was then angioplastied 
using a 10 mm high-pressure balloon. Post angioplasty portogram was performed 
and repeat pressure measurements obtained. All catheters and wires removed. The 
sheath was removed and hemostasis achieved with manual compression. A sterile 
dressing was applied. The patient tolerated procedure without immediate 
complication. FINDINGS/IMPRESSION: Portogram demonstrates patent TIPS with focal
stenosis towards the the hepatic venous end which was successfully treated using
a 10 mm high-pressure balloon. Repeat portogram demonstrates patency of the TIPS
with no significant residual stenosis.Pressure measurements as follows: Pre-
angioplasty:Main portal vein - 24 mmHgRight atrium - 6 mmHg Post-
angioplasty:Main portal vein - 18 mmHgRight atrium - 8 mmHg Signed: Dheeraj Durán MDReport Verified Date/Time: 2021 17:05:16 Reading Location: [place] [address] Angio Body Reading Room Electronicallysigned by: DHEERAJ DURÁN MD on 
2021 05:05 PMPET/CT, CARDIAC PERF REST AND [id number] 16:10:00
Referring: Dr. Qiana Joshi for exam:-&gt;liver transplant evaluation
************************************************************[facility] 1970 Sex: 
M************************************************************FINAL REPORT 
PATIENT ID: [id number] PROCEDURE: MYOCARDIAL PERFUSION PET IMAGING 
(Rest/Stress)CPT CODE: 84533 INDICATION: Evaluation for CAD prior to liver 
transplant CARDIOVASCULAR PROFILE:CAD History: NoneSymptoms: NoneRisk Factors: 
NoneBMI: 27.2Medications: None STRESS PROTOCOL:Pharmacologic stress wasachieved 
with a 10-second intravenous infusion of regadenoson 0.4 mg. The 
radiopharmaceutical was administered 30 seconds after the start of the 
regadenoson infusion. IMAGING PROTOCOL:Limited low-dose CT imaging was performed
for attenuation correction. 40.1 mCi of Rb-82 chloride was injected intraveno
usly at rest, and gated PET images were obtained. Then, 40.1 mCi of Rb-82 
chloride was injected intravenously at peak stress, and gated PET images were 
obtained. Image quality is good. REST FINDINGS:HR: 85/minBP: 106/50 mmHgPrelim. 
EKG: Normal sinus rhythm.Perfusion: there is a small mild, defect in the apex 
and apical anterior wall.Wall Motion: Normal (LVEF greater than 70%).LV Volume: 
Normal.RV Volume: Normal. STRESS FINDINGS:HR: 96/min (54% of MPHR)BP: 105/52 
mmHgPrelim. EKG: No ischemic changes.Symptoms: None (treatment not 
required).Perfusion: there is a small, moderate severity defect in the apex and 
apical anterior wall.Wall Motion: Normal (LVEF greater than 70%).LV Volume: Not 
significantly changed from rest. IMPRESSION:1. Abnormal study.2. Abnormal 
myocardial perfusion. There is a small size, moderate severity, mostly 
reversible perfusion abnormality in the apex and apical anterior LV.3. Normal 
resting LVEF, which does not deteriorate with pharmacologic stress.4. Normal 
extracardiac tracer distribution.5. There is no prior study for comparison. 
Signed: Anthony Laughlin MDReport Verified Date/Time: 2021 16:10:19 Reading 
Location: [address] Reading Room Electronically signed by: 
ANTHONY LAUGHLIN MD on 2021 04:10 PMALBUMIN PLEURAL [id number] 
14:17:00





             Test Item    Value        Reference Range Interpretation Comments

 

             ALBUMIN, PLEURAL FLUID                                        ALBUM

IN, PLEURAL FLUID =



             (BEAKER) (test code =                                        0.4 g/

dLTEST PERFORMED AT



             5831544)                                            [facility]



PROTEIN, BODY [id number] 14:15:00





             Test Item    Value        Reference Range Interpretation Comments

 

             PROTEIN FLUID (BEAKER)                                        PROTE

IN, PLEURAL = 1.0



             (test code = 579)                                        g/dLTEST P

ERFORMED AT



                                                                 [facility]



Absence of reference range indicates that normals have not been defined.Assay 
performance has not been validated for this type of specimen.RUBELLA ANTIBODY, 
[id number] 12:43:00





             Test Item    Value        Reference Range Interpretation Comments

 

             RUBELLA IGG QUANTITATION (BEAKER) 19.0 IU/mL   <8.0         H      

      



             (test code = 572)                                        



Rubella IgG Result Interpretation: &lt;/= 7.0 IU/mL Negative - Presumed non-
immune 8.0 - 9.9 IU/mL Equivocal &gt;= 10.0 IU/mL Positive - Presumed immune
VARICELLA ZOSTER ANTIBODY, [id number] 12:43:00





             Test Item    Value        Reference Range Interpretation Comments

 

             VARICELLA ZOSTER IGG (AL) (BEAKER) >                               

       



             (test code = 3197)                                        



VARICELLA ZOSTER RESULT INTERPRETATIONS: &lt;=0.8 Al Nonreactive: Presumed non-
immune to VZV 0.9-1.0Al Equivocal &gt;=1.1 Al Reactive: Presumed immune to VZV
U/S, ABDOMINAL, [id number] 12:34:00Referring: Dr. Qiana Liu Labs to
be ordered:-&gt;No Labs Needed Reason for exam:-&gt;therapeutic para
************************************************************[facility] 1970 Sex: 
M************************************************************FINAL REPORT 
PATIENT ID: [id number] History: Ascites. PROCEDURE: Limited sonographic 
examination of theabdomen was performed in preparation for planned ultrasound-
guided paracentesis. Limited sonographicexamination of the abdomen showed only a
trace amount of fluid, primarily in the right lower quadrant. Therefore, 
paracentesis was not performed. IMPRESSION: 1. Trace ascites, not enough for 
planned therapeutic paracentesis. Signed: Dheeraj Durán Verified 
Date/Time: 2021 12:34:52 Reading Location: [address] Ultrasound Reading
Room Electronically signed by: DHEERAJ DURÁN MD on 2021 12:34 PMEBV 
ANTIBODY, [id number] 11:36:00





             Test Item    Value        Reference Range Interpretation Comments

 

             RUPESH PEREZ VIRAL CAPSID Positive     Negative, Equivocal A       

     



             ANTIGEN IGG (BEAKER) (test code                                    

    



             = 3415)                                             



Rupesh Perez Viral Capsid Antigen IgG Result Interpretation: &lt;/= 0.8 Al 
Negative 0.9-1.0 Al Equivocal &gt;/= 1.1 Al [id number] 11:35:00





             Test Item    Value        Reference Range Interpretation Comments

 

             RPR SCREEN (BEAKER) (test code = Nonreactive  Nonreactive          

     



             420)                                                



CRYPTOCOCCAL [id number] 11:35:00





             Test Item    Value        Reference Range Interpretation Comments

 

             CRYPTOCOCCAL ANTIGEN, SERUM Negative     Negative, Interference    

          



             (BEAKER) (test code = 1828)                                        



CYTOMEGALOVIRUS ANTIBODY, [id number] 10:00:00





             Test Item    Value        Reference Range Interpretation Comments

 

             CYTOMEGALOVIRUS, IGG (BEAKER) Positive     Negative, Equivocal A   

         



             (test code = 3429)                                        



CMV IgG Result Interpretation: &lt;/= 0.8 Al Negative 0.9-1.0 Al Equivocal 
&gt;/=1.1 Al PositiveCYTOMEGALOVIRUS ANTIBODY, [id number] 10:00:00





             Test Item    Value        Reference Range Interpretation Comments

 

             CYTOMEGALOVIRUS IGM ANTIBODY Negative     Negative, Equivocal      

        



             (BEAKER) (test code = 3437)                                        



CMV IgM Result Interpretation: &lt;/= 0.8 Al Negative 0.9-1.0 Al Equivocal 
&gt;/= 1.1 Al PositiveEBV ANTIBODY, [id number] 10:00:00





             Test Item    Value        Reference Range Interpretation Comments

 

             RUPESH PEREZ VIRAL CAPSID Negative     Negative, Equivocal         

     



             ANTIGEN IGM (BEAKER) (test code                                    

    



             = 3418)                                             



Rupesh Perez Viral Capsid Antigen IgM Result Interpretation: &lt;/= 0.8 Al 
Negative 0.9-1.0 Al Equivocal &gt;/= 1.1 Al PositiveCOMPREHENSIVE METABOLIC 
[id number] 06:31:00





             Test Item    Value        Reference Range Interpretation Comments

 

             TOTAL PROTEIN 6.0 gm/dL    6.0-8.3                   



             (BEAKER) (test code =                                        



             770)                                                

 

             ALBUMIN (BEAKER) 1.8 g/dL     3.5-5.0      L            



             (test code = 1145)                                        

 

             ALKALINE PHOSPHATASE 89 U/L                           



             (BEAKER) (test code =                                        



             346)                                                

 

             BILIRUBIN TOTAL 7.2 mg/dL    0.2-1.2      H            



             (BEAKER) (test code =                                        



             377)                                                

 

             SODIUM (BEAKER) (test 130 meq/L    136-145      L            



             code = 381)                                         

 

             POTASSIUM (BEAKER) 3.8 meq/L    3.5-5.1                   



             (test code = 379)                                        

 

             CHLORIDE (BEAKER) 102 meq/L                        



             (test code = 382)                                        

 

             CO2 (BEAKER) (test 24 meq/L     22-29                     



             code = 355)                                         

 

             BLOOD UREA NITROGEN 7 mg/dL      7-21                      



             (BEAKER) (test code =                                        



             354)                                                

 

             CREATININE (BEAKER) 0.75 mg/dL   0.57-1.25                 



             (test code = 358)                                        

 

             GLUCOSE RANDOM 83 mg/dL                         



             (BEAKER) (test code =                                        



             652)                                                

 

             CALCIUM (BEAKER) 7.3 mg/dL    8.4-10.2     L            



             (test code = 697)                                        

 

             AST (SGOT) (BEAKER) 90 U/L       5-34         H            



             (test code = 353)                                        

 

             ALT (SGPT) (BEAKER) 49 U/L       6-55                      



             (test code = 347)                                        

 

             EGFR (BEAKER) (test 110                                    ESTIMATE

D GFR IS



             code = 1092) mL/min/1.73 sq                           NOT AS ACCURA

TE AS



                          m                                      CREATININE



                                                                 CLEARANCE IN



                                                                 PREDICTING



                                                                 GLOMERULAR



                                                                 FILTRATION RATE

.



                                                                 ESTIMATED GFR I

S



                                                                 NOT APPLICABLE 

FOR



                                                                 DIALYSIS PATIEN

TS.



 ID - ANGELIQUE WSpecimen moderately ictericPROTHROMBIN TIME/[id number] 
04:57:00





             Test Item    Value        Reference Range Interpretation Comments

 

             PROTIME (BEAKER) (test code = 26.5 seconds 11.9-14.2    H          

  



             759)                                                

 

             INR (BEAKER) (test code = 370) 2.51         <=5.90                 

   



Effective 2019: PT Reference Range ChangeNew: 11.9-14.2 Previous: 11.7-
14.7RECOMMENDED COUMADIN/WARFARIN INR THERAPY RANGESSTANDARD DOSE: 2.0-3.0 
Includes: PROPHYLAXIS for venous thrombosis, systemic embolization; TREATMENT 
for venous thrombosis and/or pulmonary embolus.HIGH RISK: Target INR is 2.5-3.5 
for patients wiht mechanical heart valves.CBC W/PLT COUNT &amp; AUTO 
[id number] 04:45:00





             Test Item    Value        Reference Range Interpretation Comments

 

             WHITE BLOOD CELL COUNT (BEAKER) 5.4 K/ L     3.5-10.5              

    



             (test code = 775)                                        

 

             RED BLOOD CELL COUNT (BEAKER) 2.94 M/ L    4.63-6.08    L          

  



             (test code = 761)                                        

 

             HEMOGLOBIN (BEAKER) (test code = 10.7 GM/DL   13.7-17.5    L       

     



             410)                                                

 

             HEMATOCRIT (BEAKER) (test code = 30.5 %       40.1-51.0    L       

     



             411)                                                

 

             MEAN CORPUSCULAR VOLUME (BEAKER) 103.7 fL     79.0-92.2    H       

     



             (test code = 753)                                        

 

             MEAN CORPUSCULAR HEMOGLOBIN 36.4 pg      25.7-32.2    H            



             (BEAKER) (test code = 751)                                        

 

             MEAN CORPUSCULAR HEMOGLOBIN CONC 35.1 GM/DL   32.3-36.5            

     



             (BEAKER) (test code = 752)                                        

 

             RED CELL DISTRIBUTION WIDTH 14.4 %       11.6-14.4                 



             (BEAKER) (test code = 412)                                        

 

             PLATELET COUNT (BEAKER) (test 112 K/CU MM  150-450      L          

  



             code = 756)                                         

 

             MEAN PLATELET VOLUME (BEAKER) 11.9 fL      9.4-12.4                

  



             (test code = 754)                                        

 

             NUCLEATED RED BLOOD CELLS 0 /100 WBC   0-0                       



             (BEAKER) (test code = 413)                                        

 

             NEUTROPHILS RELATIVE PERCENT 55 %                                  

 



             (BEAKER) (test code = 429)                                        

 

             LYMPHOCYTES RELATIVE PERCENT 21 %                                  

 



             (BEAKER) (test code = 430)                                        

 

             MONOCYTES RELATIVE PERCENT 13 %                                   



             (BEAKER) (test code = 431)                                        

 

             EOSINOPHILS RELATIVE PERCENT 8 %                                   

 



             (BEAKER) (test code = 432)                                        

 

             BASOPHILS RELATIVE PERCENT 1 %                                    



             (BEAKER) (test code = 437)                                        

 

             NEUTROPHILS ABSOLUTE COUNT 3.01 K/ L    1.78-5.38                 



             (BEAKER) (test code = 670)                                        

 

             LYMPHOCYTES ABSOLUTE COUNT 1.15 K/ L    1.32-3.57    L            



             (BEAKER) (test code = 414)                                        

 

             MONOCYTES ABSOLUTE COUNT (BEAKER) 0.73 K/ L    0.30-0.82           

      



             (test code = 415)                                        

 

             EOSINOPHILS ABSOLUTE COUNT 0.45 K/ L    0.04-0.54                 



             (BEAKER) (test code = 416)                                        

 

             BASOPHILS ABSOLUTE COUNT (BEAKER) 0.06 K/ L    0.01-0.08           

      



             (test code = 417)                                        

 

             IMMATURE GRANULOCYTES-RELATIVE 1 %          0-1                    

   



             PERCENT (BEAKER) (test code =                                      

  



             2801)                                               



SARS-COV2/RT-PCR ([facility])2021 21:15:00





             Test Item    Value        Reference Range Interpretation Comments

 

             SARS-COV2/RT-PCR (test Negative     Not Detected, Negative,        

      



             code = 9851397)              See external report for              



                                       linked test               

 

             SARS-COV-2 PERFORMING LAB [facility]AS                             



             (test code = 8766311)                                        



Negative result for this test determines that SARS-CoV-2 RNA was not present in 
the specimen above the Limit of Detection (LOD). However, Negative results do 
not preclude SARS-CoV-2 infection and should not be used as the sole basis for 
treatment or patient management decisions. Negative results must be combined 
with clinical observations, patient history, and epidemiological information. A 
false negative result may occur if a specimen is improperly collected, 
transported or handled. A false negative result should be considered if 
patient's recent exposures or clinical presentation indicate that COVID-19 
(SARS-CoV-2) is likely and diagnostic tests for other causes of illness are 
negative. Re-testing should be considered in cases of suspected false 
negatives.The limit of detection for this assay is 800 copies/mL.This SARS CoV-2
test is a real-time RT-PCR test intended for the qualitative detection of 
nucleic acid from SARS-CoV-2 in a nasopharyngeal swab specimen collected from 
individuals suspected of COVID-19 by their healthcare provider.This test has not
been Food and Drug Administration (FDA) cleared or approved. This is a modified 
version of an approved Emergency Use Authorization (EUA) and is in the process 
of review by the FDA. Once authorized by the FDA, the issued EUA will be 
effective until the declaration that circumstances exist justifying the 
authorization of the emergency use ofin vitro diagnostic tests for detection 
and/or diagnosis of COVID-19 is terminated under Section 564(b)(2) of the Act or
the EUA is revoked under Section 564(g) of the Act.Fact Sheet for Healthcare 
Prov
iders:https://www.[employer].com/sites/default/files/product/documents/Fact_Sheet_HC

_Bcfrjweri_Nxxl_AUXV-DgL-7.pdfFact Sheet for Healthcare 
Patients:https://www.[employer].com/sites/default/files/product/docume
nts/Fxre_Bkuub_Xphzmjxw_Kvxb_DPTW-GfK-9.pdfPerforming Laboratory:[place] [address] [place], [place] [id number] 
20:48:00





             Test Item    Value        Reference Range Interpretation Comments

 

             FERRITIN (BEAKER) (test code = 440.42 ng/mL 5..00  H         

   



             361)                                                



 ID - RHONDA MVITAMIN D, [id number] 19:53:00





             Test Item    Value        Reference Range Interpretation Comments

 

             VITAMIN D 25-OH (BEAKER) (test code = < ng/mL      6.6-49.9     L  

          



             2764)                                               



Effective 10/11/2017: Reference Range ChangeNew: 6.6-49.9 ng/mL Previous: 13.0-
47.8 ng/mLRecommendedVitamin D Target Range: 30.0-40.0 ng/mLOperator ID - FSE
HEPATITIS A ANTIBODY, [id number] 19:53:00





             Test Item    Value        Reference Range Interpretation Comments

 

             HEPATITIS A IGG ANTIBODY (BEAKER) Reactive     Nonreactive  A      

      



             (test code = 2797)                                        



 ID - [id number] 19:47:00





             Test Item    Value        Reference Range Interpretation Comments

 

             PROSTATE SPECIFIC ANTIGEN (BEAKER) 0.4 ng/mL    0.0-4.0            

       



             (test code = 844)                                        



 ID - FSECT, CHEST, WITHOUT [id number] 19:42:00Referring: Dr. Qiana LiuUnlisted Reason for Exam - Click Yes and Enter Reason 
Below-&gt;YesUnlisted Reason for Exam-&gt;liver transplant evaluation
************************************************************[facility] 1970 Sex: 
M************************************************************FINAL REPORT 
PATIENT ID: [id number] TECHNIQUE: CT scan of the chest WITHOUT intravenous 
contrast. Dose modulation, iterative reconstruction, and/or weight-based 
adjustment of the mA/kV was utilized to reduce the radiation dose to as low as 
reasonably achievable. INDICATION: Unlisted Reason for Examliver transplant 
evaluation. COMPARISON: 2016 CT abdomen/pelvis. FINDINGS: ABSENCE OF 
INTRAVENOUS CONTRAST DECREASES SENSITIVITY FOR DETECTION OF FOCAL LESIONS AND 
VASCULAR PATHOLOGY. LINES/TUBES: None. LUNGS AND AIRWAYS: No consolidation or 
suspicious lesion. Tracheobronchial airways are clear. PLEURA:The pleural spaces
are clear. HEART AND MEDIASTINUM: The visualized thyroid gland is normal. No 
mediastinal, hilar, or axillary lymphadenopathy. Heart is normal size. No 
pericardial effusion or aneurysmal dilatation. SOFT TISSUES AND BONES: 
Unremarkable. UPPER ABDOMEN: Cirrhotic liver with TIPS. Moderate volume of 
hypoattenuating ascites and partially imaged spleen is enlarged. Gallstones 
present within the gallbladder IMPRESSION:1. No evidence of an acute or active 
intrathoracic abnormality. 2. 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 
hypertension. 3. Cholelithiasis. Signed: Franco Rodarte [facility] Verified 
Date/Time: 2021 19:42:49 Electronically signed by: FRANCO RODARTE MD on 
2021 07:42 PMHEPATITIS B SURFACE [id number] 19:36:00





             Test Item    Value        Reference Range Interpretation Comments

 

             HEPATITIS B SURFACE ANTIBODY < mIU/mL     <8.0                     

 



             (BEAKER) (test code = 647)                                        



 ID - FSECARCINOEMBRYONIC ANTIGEN (CEA)2021 19:36:00





             Test Item    Value        Reference Range Interpretation Comments

 

             CARCINOEMBRYONIC ANTIGEN (BEAKER) 4.1 ng/mL    0.0-5.0             

      



             (test code = 685)                                        



 ID - FSEHEPATITIS B CORE ANTIBODY, [id number] 19:36:00





             Test Item    Value        Reference Range Interpretation Comments

 

             HEPATITIS B CORE IGM ANTIBODY Nonreactive  Nonreactive             

  



             (BEAKER) (test code = 645)                                        



 ID - FSEHEPATITIS A ANTIBODY, [id number] 19:36:00





             Test Item    Value        Reference Range Interpretation Comments

 

             HEPATITIS A IGM ANTIBODY (BEAKER) Nonreactive  Nonreactive         

      



             (test code = 498)                                        



 ID - FSEURINALYSIS W/ REFLEX URINE [id number] 19:34:00





             Test Item    Value        Reference Range Interpretation Comments

 

             COLOR (BEAKER) (test code = 470) Brown                             

     

 

             CLARITY (BEAKER) (test code = 469) Clear                           

       

 

             SPECIFIC GRAVITY UA (BEAKER) (test 1.036        1.001-1.035  H     

       



             code = 468)                                         

 

             PH UA (BEAKER) (test code = 467) 6.0          5.0-8.0              

     

 

             PROTEIN UA (BEAKER) (test code = 20 mg/dL     Negative     A       

     



             464)                                                

 

             GLUCOSE UA (BEAKER) (test code = Negative     Negative             

     



             365)                                                

 

             KETONES UA (BEAKER) (test code = Negative     Negative             

     



             371)                                                

 

             BILIRUBIN UA (BEAKER) (test code = Positive     Negative     A     

       



             462)                                                

 

             BLOOD UA (BEAKER) (test code = 461) Negative     Negative          

        

 

             NITRITE UA (BEAKER) (test code = Negative     Negative             

     



             465)                                                

 

             LEUKOCYTE ESTERASE UA (BEAKER) (test Negative     Negative         

         



             code = 466)                                         

 

             UROBILINOGEN UA (BEAKER) (test code > mg/dL      0.2-1.0      H    

        



             = 463)                                              

 

             RBC UA (BEAKER) (test code = 519) 0 /HPF                           

      

 

             WBC UA (BEAKER) (test code = 520) 3 /HPF                           

      

 

             MUCUS (BEAKER) (test code = 1574) Many                             

      

 

             SOURCE(BEAKER) (test code = 2799)                                  

      



 ID - [auto] ID - techHEPATITIS B SURFACE [id number] 
19:34:00





             Test Item    Value        Reference Range Interpretation Comments

 

             HEPATITIS B SURFACE ANTIGEN (2) Nonreactive  Nonreactive           

    



             (BEAKER) (test code = 2585)                                        



Specimen is considered negative for HBsAg.HEPATITIS C [id number] 
19:34:00





             Test Item    Value        Reference Range Interpretation Comments

 

             HEPATITIS C ANTIBODY (BEAKER) Nonreactive  Nonreactive             

  



             (test code = 367)                                        



 ID - FSEHEPATITIS B CORE ANTIBODY, [id number] 19:34:00





             Test Item    Value        Reference Range Interpretation Comments

 

             HEPATITIS B CORE TOTAL ANTIBODY Nonreactive  Nonreactive           

    



             (BEAKER) (test code = 497)                                        



 ID - FSEHIV-1 ANTIGEN WITH HIV-1/2 [id number] 19:34:00





             Test Item    Value        Reference Range Interpretation Comments

 

             HIV-1 ANTIGEN WITH HIV 1\T\2 Nonreactive  Nonreactive              

 



             ANTIBODY (2) (BEAKER) (test code                                   

     



             = 2586)                                             



 ID - FSEHEMOGLOBIN [id number] 19:32:00





             Test Item    Value        Reference Range Interpretation Comments

 

             HEMOGLOBIN A1C (BEAKER) (test code = < %          4.3-6.1      L   

         



             368)                                                



MR, ABDOMEN, [id number] 18:32:00Referring: Dr. Qiana LiuUnlisted 
Reason for Exam - Click Yes and Enter Reason Below-&gt;YesUnlisted Reason for 
Exam-&gt;liver transplant evaluation
************************************************************[facility] 1970 Sex: 
M************************************************************FINAL REPORT 
PATIENT ID: [id number] MR, ABDOMEN, WITH \T\ WITHOUT CONTRAST HISTORY: Unlisted 
Reason forExamliver transplant evaluation COMPARISON: Abdominal MRI 2017 
TECHNIQUE: MRI of the abdomen was performed with and without gadolinium. 
Multiplanar, multisequence images were obtained before and following intravenous
 injection of intravenous gadolinium contrast. FINDINGS: Hepatobiliary 
Findings:Contour and signal intensity: Diffusely nodular and heterogeneous. 
Focal treated observations: None. Focal observations satisfying imaging criteria
 for HCC (LI-RADS 5): None. Focal observations at leastmildly suspicious for HCC
 (LI-RADS 4 or 3): None. Other focal observations (LI-RADS 2 or 1):Small focus 
of susceptibility artifact adjacent to the TIPS (postcontrast series image 65), 
benign but of uncertain etiology Portal vein: Patent, including a patent TIPS. 
Dilated main portal vein, 20 mm. Suggestion of mild to moderate stenosis at the 
distal TIPS near the hepatic veinArterial anatomy: Conventional.Gallbladder and 
bile ducts: Gallstones. No biliary ductal dilation or any filling defects in the
 common bile duct. Nonspecific mild apparent gallbladder wall thickening, 
underdistended gallbladder.Spleen: Markedly enlarged, 17.1 cm in long axis. A 
few punctate nonenhancing foci spleen, too small tocharacterize but 
statistically most likely benign.Varices: Small varices inferior to the spleen. 
Small varices in the abdominal wall.Ascites: Moderate ascites. Additional 
Findings:Lung bases: Trace bilateral pleural effusions.Pancreas: 
Unremarkable.Adrenals: Unremarkable.Kidneys and ureters: Unremarkable.Bowel: No 
evidence for bowel obstruction. Lymph nodes: No lymphadenopathy. A 1.1 cm short 
axis lymph node in the upper retroperitoneum adjacent to the pancreatic body, 
unchanged and likely reactiveVessels: Unremarkable.Abdominal wall: 
Unremarkable.Bones: Unremarkable. IMPRESSION: Cirrhosis with sequelae of portal 
hypertension including marked splenomegaly, moderate ascites, a few small 
varices as above, and no suspicious liver lesions. TIPS, suggestion of mild to 
moderate stenosis at the distal TIPS near the hepatic vein, otherwise patent. 
Cholelithiasis. No biliary ductal dilation or filling defects. The gallbladder 
is contracted. Gallbladder wall thickening, nonspecific in the setting of under
distention and chronic liver disease. Signed: Sarina Gamboa Verified 
Date/Time: 2021 18:32:22 Reading Location: [address] Body Reading 
Room Electronically signed by: SARINA GAMBOA MD on 2021 06:32 PM
VITAMIN B12 AND [id number] 18:17:00





             Test Item    Value        Reference Range Interpretation Comments

 

             VITAMIN B12 (BEAKER) (test code = 1215 pg/mL   213-816      H      

      



             774)                                                

 

             FOLATE (BEAKER) (test code = 362) 8.40 ng/mL   >=7.00              

      



 ID - RHONDA [id number] 18:02:00





             Test Item    Value        Reference Range Interpretation Comments

 

             T4 TOTAL (BEAKER) (test code = 895) 5.9 ug/dL    4.9-11.7          

        



 ID - RHONDA [id number] 18:02:00





             Test Item    Value        Reference Range Interpretation Comments

 

             THYROID STIMULATING HORMONE 1.687 uIU/mL 0.350-4.940               



             (BEAKER) (test code = 772)                                        



 ID - RHONDA MCALCIUM, [id number] 17:42:00





             Test Item    Value        Reference Range Interpretation Comments

 

             CALCIUM IONIZED (BEAKER) (test 1.08 mmol/L  1.12-1.27    L         

   



             code = 698)                                         

 

             PH, BLOOD (BEAKER) (test code = 7.45                               

    



             1810)                                               



[id number] 17:41:00





             Test Item    Value        Reference Range Interpretation Comments

 

             TRANSFERRIN (BEAKER) (test code = 106 mg/dL    174-382      L      

      



             541)                                                



 ID - RHONDA MSpecimen moderately ictericIRON, TIBC, % SAT. (WITHOUT 
FERRITIN)2021 17:41:00





             Test Item    Value        Reference Range Interpretation Comments

 

             IRON (BEAKER) (test code = 547) 126.0 ug/dL  40.0-160.0            

    

 

             TOTAL IRON BINDING CAPACITY 133 ug/dL    250-450      L            



             (BEAKER) (test code = 769)                                        

 

             IRON % SATURATION (2) (BEAKER) 95 %         20-55        H         

   



             (test code = 2590)                                        



 ID - RHONDA [id number] 17:39:00





             Test Item    Value        Reference Range Interpretation Comments

 

             ETHANOL (BEAKER) (test code = 400) < mg/dL      <=10               

       



 ID - RHONDA [id number] 17:32:00





             Test Item    Value        Reference Range Interpretation Comments

 

             FIBRINOGEN LEVEL (BEAKER) (test 154 mg/dl    225-434      L        

    



             code = 658)                                         



[id number] 17:26:00





             Test Item    Value        Reference Range Interpretation Comments

 

             PARTIAL THROMBOPLASTIN TIME 42.0 seconds 22.5-36.0    H            



             (BEAKER) (test code = 760)                                        



BLOOD GAS, [id number] 16:01:00





             Test Item    Value        Reference Range Interpretation Comments

 

             PH ARTERIAL (BEAKER) (test code = 7.51         7.35-7.45    H      

      



             383)                                                

 

             PCO2 ARTERIAL (BEAKER) (test code 32 mm Hg     35-45        L      

      



             = 384)                                              

 

             PO2 ARTERIAL (BEAKER) (test code = 74 mm Hg     80-90        L     

       



             385)                                                

 

             O2 SATURATION ARTERIAL (BEAKER) 96.2 %       96.0-97.0             

    



             (test code = 386)                                        

 

             HCO3 ARTERIAL (BEAKER) (test code 25 mmol/L    21-29               

      



             = 388)                                              

 

             BASE EXCESS ARTERIAL (BEAKER) 2.2 mmol/L   -2.0-3.0                

  



             (test code = 387)                                        

 

             PATIENT TEMPERATURE (BEAKER) (test 37.0                            

       



             code = 1818)                                        

 

             FIO2 (BEAKER) (test code = 1819) 21.0                              

     



RAD, MANDIBLE, MIN 4 [id number] 13:58:00Referring: Dr. Qiana Joshi for exam:-&gt;liver transplant evaluation
************************************************************[facility] 1970  Sex: 
M************************************************************FINAL REPORT 
PATIENT ID: [id number] RAD, MANDIBLE, MIN 4 VIEWS INDICATION: liver transplant 
evaluationCOMPARISON: None TECHNIQUE: AP and lateral radiographs of the mandible
 FINDINGS/IMPRESSION:Multiple dental fillings. Periapical lucencies. Signed: 
Mela Cotto Verified Date/Time: 2021 13:58:03 Reading 
Location: [facility] Radiology Reading Room Electronically signed by: MELA COTTO MD on 2021 01:58 PMRAD, CHEST, 2 [id number] 
13:56:00Referring: Dr. Qiana Joshi for exam:-&gt;liver transplant 
evaluation************************************************************[facility] 1970 Sex: 
M************************************************************FINAL REPORT 
PATIENT ID: [id number] INDICATION: liver transplant evaluation COMPARISON: None 
TECHNIQUE:Frontal and lateral views of the chest. FINDINGS: Lungs and pleura: 
Clear lungs. No effusion.Heart and mediastinum: Normal heart size. Unremarkable 
mediastinal contours.Osseous structures: No acute abnormality.Additional 
findings: None. IMPRESSION: No acute intrathoracic abnormality. Signed: 
Mela Cotto Verified Date/Time: 2021 13:56:08 Reading 
[facility] [place] [facility] Electronically signed by: MELA COTTO MD on 2021 01:56 PMLIPID [id number] 13:19:00





             Test Item    Value        Reference Range Interpretation Comments

 

             TRIGLYCERIDES (BEAKER) (test code = 42 mg/dL                       

        



             540)                                                

 

             CHOLESTEROL (BEAKER) (test code = 40 mg/dL                         

      



             631)                                                

 

             HDL CHOLESTEROL (BEAKER) (test code 9 mg/dL                        

        



             = 976)                                              

 

             LDL CHOLESTEROL CALCULATED (BEAKER) 23 mg/dL                       

        



             (test code = 633)                                        



Triglyceride Reference Range: Low Risk &lt;150 Borderline 150-199 High Risk 200-
499 Very High Risk &gt;=500Cholesterol Reference Range: Low Risk &lt;200 
Borderline 200-239 High Risk &gt;240HDL Cholesterol Reference Range: Low Risk 
&gt;=60 High Risk &lt;40LDL Cholesterol Reference Range: Optimal &lt;100 Near 
Optimal 100-129 Borderline 130-159 High 160-189 Very High &gt;=190  ID -
 RHONDA MSpecimen moderately [id number] 13:09:00





             Test Item    Value        Reference Range Interpretation Comments

 

             MAGNESIUM (BEAKER) (test code = 1.9 mg/dL    1.6-2.6               

    



             627)                                                



 ID - RHONDA MURIC [id number] 13:09:00





             Test Item    Value        Reference Range Interpretation Comments

 

             URIC ACID (BEAKER) (test code = 4.0 mg/dL    2.6-7.2               

    



             773)                                                



 ID - RHONDA MSpecimen moderately [id number] 13:09:00





             Test Item    Value        Reference Range Interpretation Comments

 

             PHOSPHORUS (BEAKER) (test code = 3.4 mg/dL    2.3-4.7              

     



             604)                                                



 ID - RHONDA MGAMMA GLUTAMYL TRANSFERASE (GGT)2021 13:09:00





             Test Item    Value        Reference Range Interpretation Comments

 

             GAMMA GLUTAMYL TRANSFERASE (BEAKER) 65 U/L       9-64         H    

        



             (test code = 364)                                        



 ID - RHONDA MSpecimen moderately ictericU/S, [id number] 
09:40:00Referring: Dr. Qiana AikenenderSend ascitic fluid for cell count and 
differential If Cr &gt; 1.5, do not remove more than 3.5L of ascitic fluid. If 
&gt; 3L removed, please administer 200 mL of albumin 25% (50 grams) IV x 1Labs 
to be ordered:-&gt;Cell CountReason for Exam:-&gt;ascites, send fluid for cell 
count with differential
************************************************************[facility] 1970 Sex: 
M************************************************************FINAL REPORT 
PATIENT ID: [id number] Ultrasound guided paracentesis. Clinical History: Ascites. 
Sedation: None. : Denisa Tellez PA-C Assistant: None. Estimated
 Blood Loss: &lt; 1 cc. Specimen: 2800 cc of clear yellow fluid, samples sent to
 laboratory. Technique: Informed consent was obtained. The risks of pain, 
bleeding, infection, bowel perforation, injury to adjacent structures, and adv
erse medication reactions were discussed with the patient. After informed 
consent was obtained, the patient's abdomen was scanned. The right lower 
quadrant of the abdomen was selected for paracentesis.After the largest fluid 
pocket area was marked, and the anterior abdominal wall was evaluated with color
 Doppler to exclude presence of blood vessels traversing the area, the skin was 
prepped and draped in the usual sterile manner. After local anesthesia was 
achieved with 2% lidocaine, a 5 [place] one-step catheter was advanced into the 
peritoneal cavity under ultrasound guidance. After completion of drainage, the 
catheter was removed. There was no evidence of complication. 
Impression:Successful ultrasound guided paracentesis. Signed: Dejah Fitzgerald Verified Date/Time: 2021 09:40:17 Reading Location: [facility] P006J 
Ultrasound Reading Room Electronically signed by: DEJAH FITZGERALD M.D. on 
2021 09:40 AMCOMPREHENSIVE METABOLIC [id number] 06:17:00





             Test Item    Value        Reference Range Interpretation Comments

 

             TOTAL PROTEIN 6.0 gm/dL    6.0-8.3                   



             (BEAKER) (test code =                                        



             770)                                                

 

             ALBUMIN (BEAKER) 1.8 g/dL     3.5-5.0      L            



             (test code = 1145)                                        

 

             ALKALINE PHOSPHATASE 86 U/L                           



             (BEAKER) (test code =                                        



             346)                                                

 

             BILIRUBIN TOTAL 7.2 mg/dL    0.2-1.2      H            



             (BEAKER) (test code =                                        



             377)                                                

 

             SODIUM (BEAKER) (test 130 meq/L    136-145      L            



             code = 381)                                         

 

             POTASSIUM (BEAKER) 3.7 meq/L    3.5-5.1                   



             (test code = 379)                                        

 

             CHLORIDE (BEAKER) 102 meq/L                        



             (test code = 382)                                        

 

             CO2 (BEAKER) (test 24 meq/L     22-29                     



             code = 355)                                         

 

             BLOOD UREA NITROGEN 6 mg/dL      7-21         L            



             (BEAKER) (test code =                                        



             354)                                                

 

             CREATININE (BEAKER) 0.76 mg/dL   0.57-1.25                 



             (test code = 358)                                        

 

             GLUCOSE RANDOM 90 mg/dL                         



             (BEAKER) (test code =                                        



             652)                                                

 

             CALCIUM (BEAKER) 7.3 mg/dL    8.4-10.2     L            



             (test code = 697)                                        

 

             AST (SGOT) (BEAKER) 100 U/L      5-34         H            



             (test code = 353)                                        

 

             ALT (SGPT) (BEAKER) 54 U/L       6-55                      



             (test code = 347)                                        

 

             EGFR (BEAKER) (test 108                                    ESTIMATE

D GFR IS



             code = 1092) mL/min/1.73 sq                           NOT AS ACCURA

TE AS



                          m                                      CREATININE



                                                                 CLEARANCE IN



                                                                 PREDICTING



                                                                 GLOMERULAR



                                                                 FILTRATION RATE

.



                                                                 ESTIMATED GFR I

S



                                                                 NOT APPLICABLE 

FOR



                                                                 DIALYSIS PATIEN

TS.



 ID - RHONDA MSpecimen moderately ictericCBC W/PLT COUNT &amp; AUTO 
[id number] 05:25:00





             Test Item    Value        Reference Range Interpretation Comments

 

             WHITE BLOOD CELL COUNT (BEAKER) 5.1 K/ L     3.5-10.5              

    



             (test code = 775)                                        

 

             RED BLOOD CELL COUNT (BEAKER) 2.98 M/ L    4.63-6.08    L          

  



             (test code = 761)                                        

 

             HEMOGLOBIN (BEAKER) (test code = 10.6 GM/DL   13.7-17.5    L       

     



             410)                                                

 

             HEMATOCRIT (BEAKER) (test code = 31.3 %       40.1-51.0    L       

     



             411)                                                

 

             MEAN CORPUSCULAR VOLUME (BEAKER) 105.0 fL     79.0-92.2    H       

     



             (test code = 753)                                        

 

             MEAN CORPUSCULAR HEMOGLOBIN 35.6 pg      25.7-32.2    H            



             (BEAKER) (test code = 751)                                        

 

             MEAN CORPUSCULAR HEMOGLOBIN CONC 33.9 GM/DL   32.3-36.5            

     



             (BEAKER) (test code = 752)                                        

 

             RED CELL DISTRIBUTION WIDTH 14.5 %       11.6-14.4    H            



             (BEAKER) (test code = 412)                                        

 

             PLATELET COUNT (BEAKER) (test 114 K/CU MM  150-450      L          

  



             code = 756)                                         

 

             MEAN PLATELET VOLUME (BEAKER) 11.9 fL      9.4-12.4                

  



             (test code = 754)                                        

 

             NUCLEATED RED BLOOD CELLS 0 /100 WBC   0-0                       



             (BEAKER) (test code = 413)                                        

 

             NEUTROPHILS RELATIVE PERCENT 55 %                                  

 



             (BEAKER) (test code = 429)                                        

 

             LYMPHOCYTES RELATIVE PERCENT 20 %                                  

 



             (BEAKER) (test code = 430)                                        

 

             MONOCYTES RELATIVE PERCENT 16 %                                   



             (BEAKER) (test code = 431)                                        

 

             EOSINOPHILS RELATIVE PERCENT 8 %                                   

 



             (BEAKER) (test code = 432)                                        

 

             BASOPHILS RELATIVE PERCENT 1 %                                    



             (BEAKER) (test code = 437)                                        

 

             NEUTROPHILS ABSOLUTE COUNT 2.80 K/ L    1.78-5.38                 



             (BEAKER) (test code = 670)                                        

 

             LYMPHOCYTES ABSOLUTE COUNT 1.00 K/ L    1.32-3.57    L            



             (BEAKER) (test code = 414)                                        

 

             MONOCYTES ABSOLUTE COUNT (BEAKER) 0.80 K/ L    0.30-0.82           

      



             (test code = 415)                                        

 

             EOSINOPHILS ABSOLUTE COUNT 0.40 K/ L    0.04-0.54                 



             (BEAKER) (test code = 416)                                        

 

             BASOPHILS ABSOLUTE COUNT (BEAKER) 0.07 K/ L    0.01-0.08           

      



             (test code = 417)                                        

 

             IMMATURE GRANULOCYTES-RELATIVE 1 %          0-1                    

   



             PERCENT (BEAKER) (test code =                                      

  



             2801)                                               



PROTHROMBIN [id number] 05:08:00





             Test Item    Value        Reference Range Interpretation Comments

 

             PROTIME (MONALISA) (test code = 25.0 seconds 11.9-14.2    H          

  



             759)                                                

 

             INR (MONALISA) (test code = 370) 2.33         <=5.90                 

   



Effective 2019: PT Reference Range ChangeNew: 11.9-14.2 Previous: 11.7-
14.7RECOMMENDED COUMADIN/WARFARIN INR THERAPY RANGESSTANDARD DOSE: 2.0-3.0 
Includes: PROPHYLAXIS for venous thrombosis, systemic embolization; TREATMENT 
for venous thrombosis and/or pulmonary embolus.HIGH RISK: Target INR is 2.5-3.5 
for patients wiht mechanical heart valves.U/S, ABDOMINAL, WITH [id number]
 03:09:00Referring: Dr. Qiana Evans limited area? Add comment if 
clarification is needed.-&gt;LiverReason for exam:-&gt;Cirrhosis, Assess TIPS
************************************************************[facility] : 1970 Sex: 
M************************************************************FINAL REPORT 
PATIENT ID: [id number] U/S, ABDOMINAL, WITH DOPPLER CLINICAL INDICATION: 
Cirrhosis, AssessTIPS COMPARISON: None TECHNIQUE: Abdominal ultrasound was 
performed with Doppler. FINDINGS:Liver: 13.7 cm in length at the right 
midclavicular line. Cirrhotic morphology. No lesion is identified by ultrasound.
 Main portal vein is patent measuring 0.8 cm in diameter and demonstrates 
hepatopetal flow. ATIPS stent is present. Biliary tree: Common duct 3 mm. No 
intrahepatic biliary ductal dilatation. Gallbladder: No shadowing 
calculus/calculi. No wall thickening. No pericholecystic fluid. Negative sonog
raphic Talavera's sign. Pancreas: Largely obscured by bowel gas but the visualized
 portions of the head are grossly unremarkable. Spleen: 14.2 cm in length. 
Ascites: Small perihepatic ascites. Kidneys: Right kidney measures 11 x 6.2 x 
5.4 cm with cortical thickness of 2 cm. Left kidney measures 11.3 x 6.8 x 5.5 cm
 with cortical thickness of 1.8 cm. Normal cortical echogenicity. No mass. No 
shadowing calculus. No hydronephrosis. IVC/Aorta: Segments partially seen. 
Unremarkable. Color and spectral Doppler evaluation of the hepatic vasculature: 
The main portal vein and branches are patent and demonstrate hepatofugal flow. 
The peak systolic velocity in the main portal vein is 19.4 cm/s. The proper, 
right and left hepatic arteries are patent with normal waveforms. The resistive 
indices in the proper, right and left hepatic arteries are 0.7, 0.7 and 0.7 
respectively. The hepatic confluence, main hepaticvein and branches are patent 
with normal waveforms. The splenic vein at the pancreatic head and tailwere not 
seen due to obscuration by bowel gas. The splenic artery and vein at the splenic
 hilum are patent with normal waveforms. The peak systolic velocities in the 
proximal, mid and distal TIPS stentof 30.4 cm/s, 53.9 cm/s and 98.5 cm/s 
respectively. IMPRESSION:Decreased velocities in the proximal and mid TIPS stent
 as well as in the main portal vein. 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 
Venography may be performed for further evaluation as clinically warranted. 
Hepatofugal flow in the mainportal vein in keeping with portal hypertension. 
Cirrhotic liver. Small perihepatic ascites. Splenomegaly. Signed: Marisabel Gandara 
MDReport Verified Date/Time: 2021 03:09:45 Electronically signed by: 
MARISABEL GANDARA MD on 2021 03:09 AMBODY FLUID CELL COUNT WITH 
[id number] 13:51:00





             Test Item    Value        Reference Range Interpretation Comments

 

             APPEARANCE FLUID (BEAKER) (test Hazy         Clear        A        

    



             code = 510)                                         

 

             COLOR FLUID (BEAKER) (test code Yellow       Colorless, Straw A    

        



             = 511)                                              

 

             RBC FLUID (BEAKER) (test code = 620 /cu mm   <=1          H        

    



             513)                                                

 

             ADJUSTED WBC FLUID (BEAKER) 279 /cu mm   <=5          H            



             (test code = 1691)                                        

 

             LINING CELLS (BEAKER) (test 34 /cu mm    <=1          H            



             code = 1590)                                        

 

             NEUTROPHILS FLUID (BEAKER) 12 %                                   



             (test code = 1656)                                        

 

             LYMPHS FLUID (BEAKER) (test 33 %                                   



             code = 488)                                         

 

             MONO/MACROPHAGE FLUID (BEAKER) 55 %                                

   



             (test code = 489)                                        

 

             EOSINOPHILS FLUID (BEAKER) 0 %                                    



             (test code = 491)                                        

 

             BASO FLUID (BEAKER) (test code 0 %                                 

   



             = 492)                                              

 

             CONTAINER BODY FLUID (BEAKER) Sterile Vial                         

  



             (test code = 2873)                                        



ALPHA FETOPROTEIN (AFP), TUMOR [id number] 10:37:00





             Test Item    Value        Reference Range Interpretation Comments

 

             ALPHA-FETOPROTEIN (BEAKER) (test 4.2 ng/mL    <10.0                

     



             code = 1094)                                        



 ID Dre TOPETE FHEPATIC FUNCTION [id number] 10:28:00





             Test Item    Value        Reference Range Interpretation Comments

 

             TOTAL PROTEIN (BEAKER) (test code = 7.5 gm/dL    6.0-8.3           

        



             770)                                                

 

             ALBUMIN (BEAKER) (test code = 1145) 2.3 g/dL     3.5-5.0      L    

        

 

             BILIRUBIN TOTAL (BEAKER) (test code 9.1 mg/dL    0.2-1.2      H    

        



             = 377)                                              

 

             BILIRUBIN DIRECT (BEAKER) (test 4.2 mg/dL    0.1-0.5      H        

    



             code = 706)                                         

 

             ALKALINE PHOSPHATASE (BEAKER) (test 115 U/L                  

        



             code = 346)                                         

 

             AST (SGOT) (BEAKER) (test code = 141 U/L      5-34         H       

     



             353)                                                

 

             ALT (SGPT) (BEAKER) (test code = 71 U/L       6-55         H       

     



             347)                                                



 ID Dre EFREM FSpecimen moderately ictericBASIC METABOLIC PANEL
2021 10:28:00





             Test Item    Value        Reference Range Interpretation Comments

 

             SODIUM (BEAKER) 130 meq/L    136-145      L            



             (test code = 381)                                        

 

             POTASSIUM (BEAKER) 4.1 meq/L    3.5-5.1                   



             (test code = 379)                                        

 

             CHLORIDE (BEAKER) 100 meq/L                        



             (test code = 382)                                        

 

             CO2 (BEAKER) (test 26 meq/L     22-29                     



             code = 355)                                         

 

             BLOOD UREA NITROGEN 7 mg/dL      7-21                      



             (BEAKER) (test code                                        



             = 354)                                              

 

             CREATININE (BEAKER) 0.83 mg/dL   0.57-1.25                 



             (test code = 358)                                        

 

             GLUCOSE RANDOM 101 mg/dL                        



             (BEAKER) (test code                                        



             = 652)                                              

 

             CALCIUM (BEAKER) 7.7 mg/dL    8.4-10.2     L            



             (test code = 697)                                        

 

             EGFR (BEAKER) (test 98 mL/min/1.73                           ESTIMA

SUSI GFR IS



             code = 1092) sq m                                   NOT AS ACCURATE

 AS



                                                                 CREATININE



                                                                 CLEARANCE IN



                                                                 PREDICTING



                                                                 GLOMERULAR



                                                                 FILTRATION RATE

.



                                                                 ESTIMATED GFR I

S



                                                                 NOT APPLICABLE 

FOR



                                                                 DIALYSIS PATIEN

TS.



 ID - EFREM FSpecimen moderately ictericPROTHROMBIN TIME/INR
2021 09:50:00





             Test Item    Value        Reference Range Interpretation Comments

 

             PROTIME (BEAKER) (test code = 22.7 seconds 11.9-14.2    H          

  



             759)                                                

 

             INR (BEAKER) (test code = 370) 2.08         <=5.90                 

   



Effective 2019: PT Reference Range ChangeNew: 11.9-14.2 Previous: 11.7-
14.7RECOMMENDED COUMADIN/WARFARIN INR THERAPY RANGESSTANDARD DOSE: 2.0-3.0 
Includes: PROPHYLAXIS for venous thrombosis, systemic embolization; TREATMENT 
for venous thrombosis and/or pulmonary embolus.HIGH RISK: Target INR is 2.5-3.5 
for patients wiht mechanical heart valves.CBC W/PLT COUNT &amp; AUTO 
[id number] 09:30:00





             Test Item    Value        Reference Range Interpretation Comments

 

             WHITE BLOOD CELL COUNT (BEAKER) 6.0 K/ L     3.5-10.5              

    



             (test code = 775)                                        

 

             RED BLOOD CELL COUNT (BEAKER) 3.63 M/ L    4.63-6.08    L          

  



             (test code = 761)                                        

 

             HEMOGLOBIN (BEAKER) (test code = 13.2 GM/DL   13.7-17.5    L       

     



             410)                                                

 

             HEMATOCRIT (BEAKER) (test code = 38.8 %       40.1-51.0    L       

     



             411)                                                

 

             MEAN CORPUSCULAR VOLUME (BEAKER) 106.9 fL     79.0-92.2    H       

     



             (test code = 753)                                        

 

             MEAN CORPUSCULAR HEMOGLOBIN 36.4 pg      25.7-32.2    H            



             (BEAKER) (test code = 751)                                        

 

             MEAN CORPUSCULAR HEMOGLOBIN CONC 34.0 GM/DL   32.3-36.5            

     



             (BEAKER) (test code = 752)                                        

 

             RED CELL DISTRIBUTION WIDTH 14.6 %       11.6-14.4    H            



             (BEAKER) (test code = 412)                                        

 

             PLATELET COUNT (BEAKER) (test 141 K/CU MM  150-450      L          

  



             code = 756)                                         

 

             MEAN PLATELET VOLUME (BEAKER) 11.4 fL      9.4-12.4                

  



             (test code = 754)                                        

 

             NUCLEATED RED BLOOD CELLS 0 /100 WBC   0-0                       



             (BEAKER) (test code = 413)                                        

 

             NEUTROPHILS RELATIVE PERCENT 53 %                                  

 



             (BEAKER) (test code = 429)                                        

 

             LYMPHOCYTES RELATIVE PERCENT 23 %                                  

 



             (BEAKER) (test code = 430)                                        

 

             MONOCYTES RELATIVE PERCENT 15 %                                   



             (BEAKER) (test code = 431)                                        

 

             EOSINOPHILS RELATIVE PERCENT 7 %                                   

 



             (BEAKER) (test code = 432)                                        

 

             BASOPHILS RELATIVE PERCENT 1 %                                    



             (BEAKER) (test code = 437)                                        

 

             NEUTROPHILS ABSOLUTE COUNT 3.20 K/ L    1.78-5.38                 



             (BEAKER) (test code = 670)                                        

 

             LYMPHOCYTES ABSOLUTE COUNT 1.36 K/ L    1.32-3.57                 



             (BEAKER) (test code = 414)                                        

 

             MONOCYTES ABSOLUTE COUNT (BEAKER) 0.93 K/ L    0.30-0.82    H      

      



             (test code = 415)                                        

 

             EOSINOPHILS ABSOLUTE COUNT 0.40 K/ L    0.04-0.54                 



             (BEAKER) (test code = 416)                                        

 

             BASOPHILS ABSOLUTE COUNT (BEAKER) 0.08 K/ L    0.01-0.08           

      



             (test code = 417)                                        

 

             IMMATURE GRANULOCYTES-RELATIVE 1 %          0-1                    

   



             PERCENT (BEAKER) (test code =                                      

  



             2801)                                               



BLOOD [id number] 17:00:00





             Test Item    Value        Reference Range Interpretation Comments

 

             CULTURE (BEAKER) (test No growth in 5 days                         

  



             code = 1095)                                        



BLOOD [id number] 17:00:00





             Test Item    Value        Reference Range Interpretation Comments

 

             CULTURE (BEAKER) (test No growth in 5 days                         

  



             code = 1095)                                        



COMPREHENSIVE METABOLIC [id number] 07:39:00





             Test Item    Value        Reference Range Interpretation Comments

 

             TOTAL PROTEIN 6.6 gm/dL    6.0-8.3                   Specimen sligh

tly



             (BEAKER) (test code =                                        hemoly

zed



             770)                                                

 

             ALBUMIN (BEAKER) 2.7 g/dL     3.5-5.0      L            Specimen sl

ightly



             (test code = 1145)                                        hemolyzed

 

             ALKALINE PHOSPHATASE 78 U/L                           



             (BEAKER) (test code =                                        



             346)                                                

 

             BILIRUBIN TOTAL 2.6 mg/dL    0.2-1.2      H            Specimen sli

ghtly



             (BEAKER) (test code =                                        hemoly

zed



             377)                                                

 

             SODIUM (BEAKER) (test 134 meq/L    136-145      L            



             code = 381)                                         

 

             POTASSIUM (BEAKER) 4.0 meq/L    3.5-5.1                   Specimen 

slightly



             (test code = 379)                                        hemolyzed

 

             CHLORIDE (BEAKER) 107 meq/L                        



             (test code = 382)                                        

 

             CO2 (BEAKER) (test 21 meq/L     22-29        L            



             code = 355)                                         

 

             BLOOD UREA NITROGEN 9 mg/dL      7-21                      



             (BEAKER) (test code =                                        



             354)                                                

 

             CREATININE (BEAKER) 0.69 mg/dL   0.57-1.25                 Specimen

 slightly



             (test code = 358)                                        hemolyzed

 

             GLUCOSE RANDOM 126 mg/dL           H            



             (BEAKER) (test code =                                        



             652)                                                

 

             CALCIUM (BEAKER) 7.8 mg/dL    8.4-10.2     L            



             (test code = 697)                                        

 

             AST (SGOT) (BEAKER) 52 U/L       5-34         H            Specimen

 slightly



             (test code = 353)                                        hemolyzed

 

             ALT (SGPT) (BEAKER) 24 U/L       6-55                      Specimen

 slightly



             (test code = 347)                                        hemolyzed

 

             EGFR (BEAKER) (test 123                                    ESTIMATE

D GFR IS



             code = 1092) mL/min/1.73 sq                           NOT AS ACCURA

TE AS



                          m                                      CREATININE



                                                                 CLEARANCE IN



                                                                 PREDICTING



                                                                 GLOMERULAR



                                                                 FILTRATION RATE

.



                                                                 ESTIMATED GFR I

S



                                                                 NOT APPLICABLE 

FOR



                                                                 DIALYSIS PATIEN

TS.



Specimen slightly ictericHEMOGLOBIN AND [id number] 06:16:00





             Test Item    Value        Reference Range Interpretation Comments

 

             HEMOGLOBIN (BEAKER) (test code = 8.8 GM/DL    13.7-17.5    L       

     



             410)                                                

 

             HEMATOCRIT (BEAKER) (test code = 28.4 %       40.1-51.0    L       

     



             411)                                                



HEMOGLOBIN AND [id number] 23:04:00





             Test Item    Value        Reference Range Interpretation Comments

 

             HEMOGLOBIN (BEAKER) (test code = 9.6 GM/DL    13.7-17.5    L       

     



             410)                                                

 

             HEMATOCRIT (BEAKER) (test code = 31.6 %       40.1-51.0    L       

     



             411)                                                



ANG, [id number] 17:11:00Referring: Dr. Qiana Joshi for exam:-
&gt;Recurrent GI bleedFINAL REPORT PATIENT ID: [id number] TIPS procedure, 
2017 HISTORY: Variceal bleeding Anesthesia: General Approach: Right 
internal jugular vein Modality: Ultrasound and fluoroscopy, fluoroscopy time:18 
minutes, total dose: 6-0 mGy, reference air kerma method TECHNIQUE: This 
procedure was performed after obtaining written informed consent using all 
elements maximal sterile barrier technique. Ultrasound was used to identify the 
right internal jugular vein was used for access the vein is well. No images were
 saved on PACS. Catheter was advanced in the right hepatic vein and contrast was
 injected confirming patency and appropriate positioning. A coaxial needle 
system was then advanced into the right hepatic vein and directed through the 
liver inferiorly. A vessel was encountered and the sheath partially advanced 
into the vascular system. The vessel entered was a prominent right hepatic 
artery. The sheath was removed from the right hepatic artery and the tract 
between the hepatic artery and the right hepatic vein was embolized with a 
stainless steel coil. A second puncture was then made more medially, entering 
the right portal vein. A guidewire was introduced into the portal vein and 
subsequently in the splenic vein. The tract was initially dilated with a 6 mm 
balloon catheter. An 8 cm in length by 10 mm in diameter Viatorr covered stent 
was deployed and subsequently dilated to 8 mm. Post TIPS placement angiography 
discloses satisfactory appearance with a pet portosystemic gradient of 9 mm. P
ortal vein pressure was 25 mm in the IVC pressure was 16 mm. The catheter was 
removed and neck and hemostasis obtained. CONCLUSION: TIPS placement. Signed: 
Rupa Blanco MDReport Verified Date/Time: 2017 17:11:07 Reading Location:
 [place] [address] Angio Body Reading Room Electronically signed by: RUPA BLANCO M.D. on 2017 05:11 PMURINE [id number] 14:13:00





             Test Item    Value        Reference Range Interpretation Comments

 

             CULTURE (DARBYNATASHA) (test code = 1095) No growth                      

        



CBC W/PLT COUNT &amp; AUTO [id number] 13:29:00





             Test Item    Value        Reference Range Interpretation Comments

 

             WHITE BLOOD CELL COUNT 4.3 K/ L     3.5-10.5                  



             (BEAKER) (test code =                                        



             775)                                                

 

             RED BLOOD CELL COUNT 3.19 M/ L    4.63-6.08    L            



             (BEAKER) (test code =                                        



             761)                                                

 

             HEMOGLOBIN (BEAKER) 7.7 GM/DL    13.7-17.5    L            



             (test code = 410)                                        

 

             HEMATOCRIT (BEAKER) 25.0 %       40.1-51.0    L            



             (test code = 411)                                        

 

             MEAN CORPUSCULAR VOLUME 78.7 fL      79.0-92.2    L            



             (BEAKER) (test code =                                        



             753)                                                

 

             MEAN CORPUSCULAR 24.1 pg      25.7-32.2    L            



             HEMOGLOBIN (BEAKER)                                        



             (test code = 751)                                        

 

             MEAN CORPUSCULAR 30.7 GM/DL   32.3-36.5    L            



             HEMOGLOBIN CONC                                        



             (BEAKER) (test code =                                        



             752)                                                

 

             RED CELL DISTRIBUTION 18.2 %       11.6-14.4    H            



             WIDTH (BEAKER) (test                                        



             code = 412)                                         

 

             PLATELET COUNT (BEAKER) 54 K/CU MM   150-450      L            



             (test code = 756)                                        

 

             MEAN PLATELET VOLUME  fL          9.4-12.4                  Unable 

to report due



             (BEAKER) (test code =                                        to abn

ormal Platelet



             754)                                                population



                                                                 distribution.

 

             NUCLEATED RED BLOOD 0 /100 WBC   0-0                       



             CELLS (BEAKER) (test                                        



             code = 413)                                         

 

             NEUTROPHILS RELATIVE 56 %                                   



             PERCENT (BEAKER) (test                                        



             code = 429)                                         

 

             LYMPHOCYTES RELATIVE 22 %                                   



             PERCENT (BEAKER) (test                                        



             code = 430)                                         

 

             MONOCYTES RELATIVE 11 %                                   



             PERCENT (BEAKER) (test                                        



             code = 431)                                         

 

             EOSINOPHILS RELATIVE 10 %                                   



             PERCENT (BEAKER) (test                                        



             code = 432)                                         

 

             BASOPHILS RELATIVE 1 %                                    



             PERCENT (BEAKER) (test                                        



             code = 437)                                         

 

             NEUTROPHILS ABSOLUTE 2.41 K/ L    1.78-5.38                 



             COUNT (BEAKER) (test                                        



             code = 670)                                         

 

             LYMPHOCYTES ABSOLUTE 0.96 K/ L    1.32-3.57    L            



             COUNT (BEAKER) (test                                        



             code = 414)                                         

 

             MONOCYTES ABSOLUTE 0.47 K/ L    0.30-0.82                 



             COUNT (BEAKER) (test                                        



             code = 415)                                         

 

             EOSINOPHILS ABSOLUTE 0.41 K/ L    0.04-0.54                 



             COUNT (BEAKER) (test                                        



             code = 416)                                         

 

             BASOPHILS ABSOLUTE 0.02 K/ L    0.01-0.08                 



             COUNT (BEAKER) (test                                        



             code = 417)                                         

 

             IMMATURE     0 %          0-1                       



             GRANULOCYTES-RELATIVE                                        



             PERCENT (BEAKER) (test                                        



             code = 2806)                                        



(MANUAL DIFFERENTIAL)2017 13:29:00





             Test Item    Value        Reference Range Interpretation Comments

 

             TOTAL COUNTED (BEAKER) (test code =                                

        



             1351)                                               

 

             WBC MORPHOLOGY (BEAKER) (test code = Normal                        

         



             487)                                                

 

             PLT MORPHOLOGY (BEAKER) (test code = Normal                        

         



             486)                                                

 

             RBC MORPHOLOGY (BEAKER) (test code = Normal                        

         



             762)                                                



PROTHROMBIN [id number] 09:45:00





             Test Item    Value        Reference Range Interpretation Comments

 

             PROTIME (BEAKER) (test code = 17.3 seconds 11.7-14.7    H          

  



             759)                                                

 

             INR (BEAKER) (test code = 370) 1.4          <=5.9                  

   



RECOMMENDED COUMADIN/WARFARIN INR THERAPY RANGESSTANDARD DOSE: 2.0 - 3.0 
Includes: PROPHYLAXIS for venous thrombosis, systemic embolization; TREATMENT 
for venous thrombosis and/or pulmonary embolus.HIGH RISK: Target INR is 2.5-3.5 
for patients with mechanical heart valves.COMPREHENSIVE METABOLIC PANEL
2017 07:07:00





             Test Item    Value        Reference Range Interpretation Comments

 

             TOTAL PROTEIN 6.6 gm/dL    6.0-8.3                   



             (BEAKER) (test code =                                        



             770)                                                

 

             ALBUMIN (BEAKER) 2.8 g/dL     3.5-5.0      L            



             (test code = 1145)                                        

 

             ALKALINE PHOSPHATASE 77 U/L                           



             (BEAKER) (test code =                                        



             346)                                                

 

             BILIRUBIN TOTAL 2.6 mg/dL    0.2-1.2      H            



             (BEAKER) (test code =                                        



             377)                                                

 

             SODIUM (BEAKER) (test 136 meq/L    136-145                   



             code = 381)                                         

 

             POTASSIUM (BEAKER) 3.6 meq/L    3.5-5.1                   



             (test code = 379)                                        

 

             CHLORIDE (BEAKER) 106 meq/L                        



             (test code = 382)                                        

 

             CO2 (BEAKER) (test 23 meq/L     22-29                     



             code = 355)                                         

 

             BLOOD UREA NITROGEN 11 mg/dL     7-21                      



             (BEAKER) (test code =                                        



             354)                                                

 

             CREATININE (BEAKER) 0.82 mg/dL   0.57-1.25                 



             (test code = 358)                                        

 

             GLUCOSE RANDOM 107 mg/dL           H            



             (BEAKER) (test code =                                        



             652)                                                

 

             CALCIUM (BEAKER) 7.9 mg/dL    8.4-10.2     L            



             (test code = 697)                                        

 

             AST (SGOT) (BEAKER) 34 U/L       5-34                      



             (test code = 353)                                        

 

             ALT (SGPT) (BEAKER) 14 U/L       6-55                      



             (test code = 347)                                        

 

             EGFR (BEAKER) (test 101                                    ESTIMATE

D GFR IS



             code = 1092) mL/min/1.73 sq                           NOT AS ACCURA

TE AS



                          m                                      CREATININE



                                                                 CLEARANCE IN



                                                                 PREDICTING



                                                                 GLOMERULAR



                                                                 FILTRATION RATE

.



                                                                 ESTIMATED GFR I

S



                                                                 NOT APPLICABLE 

FOR



                                                                 DIALYSIS PATIEN

TS.



Specimen slightly [id number] 06:58:00





             Test Item    Value        Reference Range Interpretation Comments

 

             PHOSPHORUS (BEAKER) (test code = 3.1 mg/dL    2.3-4.7              

     



             604)                                                



[id number] 06:58:00





             Test Item    Value        Reference Range Interpretation Comments

 

             MAGNESIUM (BEAKER) (test code = 1.8 mg/dL    1.6-2.6               

    



             627)                                                



HEMOGLOBIN AND [id number] 22:27:00





             Test Item    Value        Reference Range Interpretation Comments

 

             HEMOGLOBIN (BEAKER) (test code = 7.3 GM/DL    13.7-17.5    L       

     



             410)                                                

 

             HEMATOCRIT (BEAKER) (test code = 23.9 %       40.1-51.0    L       

     



             411)                                                



HEMOGLOBIN AND [id number] 13:19:00





             Test Item    Value        Reference Range Interpretation Comments

 

             HEMOGLOBIN (BEAKER) (test code = 7.5 GM/DL    13.7-17.5    L       

     



             410)                                                

 

             HEMATOCRIT (BEAKER) (test code = 24.8 %       40.1-51.0    L       

     



             411)                                                



POCT-GLUCOSE [id number] 12:25:00





             Test Item    Value        Reference Range Interpretation Comments

 

             POC-GLUCOSE METER 128 mg/dL           H            TESTED AT 

[facility] [address]



             (BEAKER) (test code =                                        [address] [place]



             1538)                                               [address]



MR, ABDOMEN, [id number] 11:54:00Referring: Dr. Qiana Pollack PROTOCOL
FINAL REPORT PATIENT ID: [id number] INDICATION:47-year-old male with cirrhosis. 
Surveillance for hepatocellular carcinoma. COMPARISON: Abdomen pelvis CT exam 
2016 TECHNIQUE: MR of the AbdomenWITHOUT and WITH intravenous 
contrast. FINDINGS: Irregular contour of the liver is in keeping with cirrhosis.
 No significant lobar hypertrophy or atrophy is demonstrated. Diffuse mild 
heterogeneous signal of the liver parenchyma is noted. No suspicious enhancing 
liver mass is demonstrated. There is nothrombosis in the portal vein, superior 
mesenteric vein, or splenic vein. Main portal vein is mildlydilated measuring 
1.3 cm in diameter. Spleen is mildly enlarged measuring 14 cm in sagittal 
diameter. There is no ascites or upper abdominal lymphadenopathy. No significant
 varices are demonstrated. Gallbladder is notable for a few small stones. There 
is no biliary ductal dilatation. Pancreas, adrenalglands, kidneys, and 
visualized bowel loops are unremarkable. Partial imaging of the lower thorax is
unremarkable. No suspicious marrow signal abnormality demonstrated. 
IMPRESSION:Cirrhosis without evidence of hepatocellular carcinoma. No portal 
vein, superior mesenteric vein, or splenic vein thrombosis. Mildly dilated main 
portal vein and mild prostatomegaly. No ascites. Cholelithiasis. Signed: Rommel Mendoza MDReport Verified Date/Time: 2017 11:54:46 Reading Location: 
[address] Reading Room Electronically signed by: ROMMEL YAP M.D. on 2017 11:54 [id number] 06:12:00





             Test Item    Value        Reference Range Interpretation Comments

 

             PHOSPHORUS (BEAKER) (test code = 2.0 mg/dL    2.3-4.7      L       

     



             604)                                                



[id number] 06:12:00





             Test Item    Value        Reference Range Interpretation Comments

 

             MAGNESIUM (BEAKER) (test code = 1.7 mg/dL    1.6-2.6               

    



             627)                                                



COMPREHENSIVE METABOLIC [id number] 06:12:00





             Test Item    Value        Reference Range Interpretation Comments

 

             TOTAL PROTEIN 6.5 gm/dL    6.0-8.3                   



             (BEAKER) (test code =                                        



             770)                                                

 

             ALBUMIN (BEAKER) 2.8 g/dL     3.5-5.0      L            



             (test code = 1145)                                        

 

             ALKALINE PHOSPHATASE 79 U/L                           



             (BEAKER) (test code =                                        



             346)                                                

 

             BILIRUBIN TOTAL 3.0 mg/dL    0.2-1.2      H            



             (BEAKER) (test code =                                        



             377)                                                

 

             SODIUM (BEAKER) (test 139 meq/L    136-145                   



             code = 381)                                         

 

             POTASSIUM (BEAKER) 3.7 meq/L    3.5-5.1                   



             (test code = 379)                                        

 

             CHLORIDE (BEAKER) 109 meq/L           H            



             (test code = 382)                                        

 

             CO2 (BEAKER) (test 26 meq/L     22-29                     



             code = 355)                                         

 

             BLOOD UREA NITROGEN 19 mg/dL     7-21                      



             (BEAKER) (test code =                                        



             354)                                                

 

             CREATININE (BEAKER) 0.88 mg/dL   0.57-1.25                 



             (test code = 358)                                        

 

             GLUCOSE RANDOM 113 mg/dL           H            



             (BEAKER) (test code =                                        



             652)                                                

 

             CALCIUM (BEAKER) 8.0 mg/dL    8.4-10.2     L            



             (test code = 697)                                        

 

             AST (SGOT) (BEAKER) 36 U/L       5-34         H            



             (test code = 353)                                        

 

             ALT (SGPT) (BEAKER) 16 U/L       6-55                      



             (test code = 347)                                        

 

             EGFR (BEAKER) (test 93 mL/min/1.73                           ESTIMA

SUSI GFR IS



             code = 1092) sq m                                   NOT AS ACCURATE

 AS



                                                                 CREATININE



                                                                 CLEARANCE IN



                                                                 PREDICTING



                                                                 GLOMERULAR



                                                                 FILTRATION RATE

.



                                                                 ESTIMATED GFR I

S



                                                                 NOT APPLICABLE 

FOR



                                                                 DIALYSIS PATIEN

TS.



Specimen slightly ictericCBC W/PLT COUNT &amp; AUTO [id number] 
05:43:00





             Test Item    Value        Reference Range Interpretation Comments

 

             WHITE BLOOD CELL COUNT 3.8 K/ L     3.5-10.5                  



             (BEAKER) (test code =                                        



             775)                                                

 

             RED BLOOD CELL COUNT 3.19 M/ L    4.63-6.08    L            



             (BEAKER) (test code =                                        



             761)                                                

 

             HEMOGLOBIN (BEAKER) 7.6 GM/DL    13.7-17.5    L            



             (test code = 410)                                        

 

             HEMATOCRIT (BEAKER) 24.9 %       40.1-51.0    L            



             (test code = 411)                                        

 

             MEAN CORPUSCULAR VOLUME 78.1 fL      79.0-92.2    L            



             (BEAKER) (test code =                                        



             753)                                                

 

             MEAN CORPUSCULAR 23.8 pg      25.7-32.2    L            



             HEMOGLOBIN (BEAKER)                                        



             (test code = 751)                                        

 

             MEAN CORPUSCULAR 30.5 GM/DL   32.3-36.5    L            



             HEMOGLOBIN CONC                                        



             (BEAKER) (test code =                                        



             752)                                                

 

             RED CELL DISTRIBUTION 17.7 %       11.6-14.4    H            



             WIDTH (BEAKER) (test                                        



             code = 412)                                         

 

             PLATELET COUNT (BEAKER) 61 K/CU MM   150-450      L            



             (test code = 756)                                        

 

             MEAN PLATELET VOLUME  fL          9.4-12.4                  Unable 

to report due



             (BEAKER) (test code =                                        to abn

ormal Platelet



             754)                                                population



                                                                 distribution.

 

             NUCLEATED RED BLOOD 0 /100 WBC   0-0                       



             CELLS (BEAKER) (test                                        



             code = 413)                                         

 

             NEUTROPHILS RELATIVE 59 %                                   



             PERCENT (BEAKER) (test                                        



             code = 429)                                         

 

             LYMPHOCYTES RELATIVE 22 %                                   



             PERCENT (BEAKER) (test                                        



             code = 430)                                         

 

             MONOCYTES RELATIVE 11 %                                   



             PERCENT (BEAKER) (test                                        



             code = 431)                                         

 

             EOSINOPHILS RELATIVE 7 %                                    



             PERCENT (BEAKER) (test                                        



             code = 432)                                         

 

             BASOPHILS RELATIVE 1 %                                    



             PERCENT (BEAKER) (test                                        



             code = 437)                                         

 

             NEUTROPHILS ABSOLUTE 2.26 K/ L    1.78-5.38                 



             COUNT (BEAKER) (test                                        



             code = 670)                                         

 

             LYMPHOCYTES ABSOLUTE 0.83 K/ L    1.32-3.57    L            



             COUNT (BEAKER) (test                                        



             code = 414)                                         

 

             MONOCYTES ABSOLUTE 0.42 K/ L    0.30-0.82                 



             COUNT (BEAKER) (test                                        



             code = 415)                                         

 

             EOSINOPHILS ABSOLUTE 0.27 K/ L    0.04-0.54                 



             COUNT (BEAKER) (test                                        



             code = 416)                                         

 

             BASOPHILS ABSOLUTE 0.03 K/ L    0.01-0.08                 



             COUNT (BEAKER) (test                                        



             code = 417)                                         

 

             IMMATURE     1 %          0-1                       



             GRANULOCYTES-RELATIVE                                        



             PERCENT (BEAKER) (test                                        



             code = 2801)                                        



HEMOGLOBIN AND [id number] 05:40:00





             Test Item    Value        Reference Range Interpretation Comments

 

             HEMOGLOBIN (BEAKER) (test code = 7.6 GM/DL    13.7-17.5    L       

     



             410)                                                

 

             HEMATOCRIT (BEAKER) (test code = 24.9 %       40.1-51.0    L       

     



             411)                                                



POCT-GLUCOSE [id number] 00:46:00





             Test Item    Value        Reference Range Interpretation Comments

 

             POC-GLUCOSE METER 122 mg/dL           H            TESTED AT 

[facility] [address]



             (BEAKER) (test code =                                        [address] [place]



             [address])                                               [address]



HEMOGLOBIN AND [id number] 00:12:00





             Test Item    Value        Reference Range Interpretation Comments

 

             HEMOGLOBIN (BEAKER) (test code = 6.6 GM/DL    13.7-17.5    L       

     



             410)                                                

 

             HEMATOCRIT (BEAKER) (test code = 21.6 %       40.1-51.0    L       

     



             411)                                                



URINALYSIS W/ [id number] 19:27:00





             Test Item    Value        Reference Range Interpretation Comments

 

             COLOR (BEAKER) (test code = Yellow                                 



             470)                                                

 

             CLARITY (BEAKER) (test code = Clear                                

  



             469)                                                

 

             SPECIFIC GRAVITY UA (BEAKER) 1.012        1.001-1.035              

 



             (test code = 468)                                        

 

             PH UA (BEAKER) (test code = 7.5          5.0-8.0                   



             467)                                                

 

             PROTEIN UA (BEAKER) (test code Negative     Negative               

   



             = 464)                                              

 

             GLUCOSE UA (BEAKER) (test code Negative     Negative               

   



             = 365)                                              

 

             KETONES UA (BEAKER) (test code Trace        Negative     A         

   



             = 371)                                              

 

             BILIRUBIN UA (BEAKER) (test Negative     Negative                  



             code = 462)                                         

 

             BLOOD UA (BEAKER) (test code = Negative     Negative               

   



             461)                                                

 

             NITRITE UA (BEAKER) (test code Negative     Negative               

   



             = 465)                                              

 

             LEUKOCYTE ESTERASE UA (BEAKER) Negative     Negative               

   



             (test code = 466)                                        

 

             UROBILINOGEN UA (BEAKER) (test 0.2 mg/dL    0.2-1.0                

   



             code = 463)                                         

 

             RBC UA (BEAKER) (test code = < /HPF                                

 



             519)                                                

 

             WBC UA (BEAKER) (test code = 0 /HPF                                

 



             520)                                                

 

             SOURCE(BEAKER) (test code = Urine, Voided                          

 



             9503)                                               



POCT-GLUCOSE [id number] 18:35:00





             Test Item    Value        Reference Range Interpretation Comments

 

             POC-GLUCOSE METER 191 mg/dL           H            TESTED AT 

[facility] [address]



             (BEAKER) (test code =                                        JAY GUAJARDO [facility]



             1538)                                               [address]



BILIRUBIN, [id number] 18:14:00





             Test Item    Value        Reference Range Interpretation Comments

 

             BILIRUBIN DIRECT (BEAKER) (test 0.6 mg/dL    0.1-0.5      H        

    



             code = 706)                                         



HEMOGLOBIN AND [id number] 17:41:00





             Test Item    Value        Reference Range Interpretation Comments

 

             HEMOGLOBIN (BEAKER) (test code = 7.0 GM/DL    13.7-17.5    L       

     



             410)                                                

 

             HEMATOCRIT (BEAKER) (test code = 22.9 %       40.1-51.0    L       

     



             411)                                                



T4, [id number] 13:51:00





             Test Item    Value        Reference Range Interpretation Comments

 

             FREE T4 (BEAKER) (test code = 655) 0.86 ng/dL   0.70-1.48          

       



[id number] 13:51:00





             Test Item    Value        Reference Range Interpretation Comments

 

             THYROID STIMULATING HORMONE 0.36 uIU/mL  0.35-4.94                 



             (BEAKER) (test code = 772)                                        



HEPATIC FUNCTION [id number] 13:26:00





             Test Item    Value        Reference Range Interpretation Comments

 

             TOTAL PROTEIN (BEAKER) (test code = 7.2 gm/dL    6.0-8.3           

        



             770)                                                

 

             ALBUMIN (BEAKER) (test code = 1145) 3.1 g/dL     3.5-5.0      L    

        

 

             BILIRUBIN TOTAL (BEAKER) (test code 3.1 mg/dL    0.2-1.2      H    

        



             = 377)                                              

 

             BILIRUBIN DIRECT (BEAKER) (test 0.6 mg/dL    0.1-0.5      H        

    



             code = 706)                                         

 

             ALKALINE PHOSPHATASE (BEAKER) (test 87 U/L                   

        



             code = 346)                                         

 

             AST (SGOT) (BEAKER) (test code = 32 U/L       5-34                 

     



             353)                                                

 

             ALT (SGPT) (BEAKER) (test code = 18 U/L       6-55                 

     



             347)                                                



Specimen slightly ictericHEMOGLOBIN AND [id number] 12:29:00





             Test Item    Value        Reference Range Interpretation Comments

 

             HEMOGLOBIN (BEAKER) (test code = 7.2 GM/DL    13.7-17.5    L       

     



             410)                                                

 

             HEMATOCRIT (BEAKER) (test code = 23.8 %       40.1-51.0    L       

     



             411)                                                



BASIC METABOLIC [id number] 12:03:00





             Test Item    Value        Reference Range Interpretation Comments

 

             SODIUM (BEAKER) 140 meq/L    136-145                   



             (test code = 381)                                        

 

             POTASSIUM (BEAKER) 4.7 meq/L    3.5-5.1                   



             (test code = 379)                                        

 

             CHLORIDE (BEAKER) 108 meq/L           H            



             (test code = 382)                                        

 

             CO2 (BEAKER) (test 24 meq/L     22-29                     



             code = 355)                                         

 

             BLOOD UREA NITROGEN 22 mg/dL     7-21         H            



             (BEAKER) (test code                                        



             = 354)                                              

 

             CREATININE (BEAKER) 0.78 mg/dL   0.57-1.25                 



             (test code = 358)                                        

 

             GLUCOSE RANDOM 155 mg/dL           H            



             (BEAKER) (test code                                        



             = 652)                                              

 

             CALCIUM (BEAKER) 8.1 mg/dL    8.4-10.2     L            



             (test code = 697)                                        

 

             EGFR (BEAKER) (test 107 mL/min/1.73                           ESTIM

ATED GFR IS



             code = 1092) sq m                                   NOT AS ACCURATE

 AS



                                                                 CREATININE



                                                                 CLEARANCE IN



                                                                 PREDICTING



                                                                 GLOMERULAR



                                                                 FILTRATION RATE

.



                                                                 ESTIMATED GFR I

S



                                                                 NOT APPLICABLE 

FOR



                                                                 DIALYSIS PATIEN

TS.



Specimen slightly ictericRAD, CHEST, 1 VIEW, NON [id number] 11:40:00
Referring: Dr. Qiana Joshi for exam:-&gt;pulmonary insufficiencyShould
 this be performed at the bedside?-&gt;YesFINAL REPORT PATIENT ID: [id number] 
Chest one view AP 2017 11:40 AM CLINICAL HISTORY: pulmonary insufficiency 
COMPARISON: None available FINDINGS: The lungs are clear. Cardiomediastinal 
contours are within normal limits. The central pulmonary vasculature is not 
engorged. IMPRESSION: Unremarkable frontal chest radiograph. Signed: Seipel, Timothy MDReport Verified Date/Time: 2017 11:40:03 Reading Location: [facility] Radiology Reading Room Electronically signed by: TIMOTHY J SEIPEL, M.D. on 2017 11:40 AMCBC W/PLT COUNT &amp; AUTO [id number] 
10:54:00





             Test Item    Value        Reference Range Interpretation Comments

 

             WHITE BLOOD CELL COUNT 6.1 K/ L     3.5-10.5                  



             (BEAKER) (test code =                                        



             775)                                                

 

             RED BLOOD CELL COUNT 3.06 M/ L    4.63-6.08    L            



             (BEAKER) (test code =                                        



             761)                                                

 

             HEMOGLOBIN (BEAKER) 7.2 GM/DL    13.7-17.5    L            



             (test code = 410)                                        

 

             HEMATOCRIT (BEAKER) 23.7 %       40.1-51.0    L            



             (test code = 411)                                        

 

             MEAN CORPUSCULAR VOLUME 77.5 fL      79.0-92.2    L            



             (BEAKER) (test code =                                        



             753)                                                

 

             MEAN CORPUSCULAR 23.5 pg      25.7-32.2    L            



             HEMOGLOBIN (BEAKER)                                        



             (test code = 751)                                        

 

             MEAN CORPUSCULAR 30.4 GM/DL   32.3-36.5    L            



             HEMOGLOBIN CONC                                        



             (BEAKER) (test code =                                        



             752)                                                

 

             RED CELL DISTRIBUTION 18.6 %       11.6-14.4    H            



             WIDTH (BEAKER) (test                                        



             code = 412)                                         

 

             PLATELET COUNT (BEAKER) 69 K/CU MM   150-450      L            



             (test code = 756)                                        

 

             MEAN PLATELET VOLUME  fL          9.4-12.4                  Unable 

to report due



             (BEAKER) (test code =                                        to abn

ormal Platelet



             754)                                                population



                                                                 distribution.

 

             NUCLEATED RED BLOOD 0 /100 WBC   0-0                       



             CELLS (BEAKER) (test                                        



             code = 413)                                         

 

             NEUTROPHILS RELATIVE 70 %                                   



             PERCENT (BEAKER) (test                                        



             code = 429)                                         

 

             LYMPHOCYTES RELATIVE 16 %                                   



             PERCENT (BEAKER) (test                                        



             code = 430)                                         

 

             MONOCYTES RELATIVE 13 %                                   



             PERCENT (BEAKER) (test                                        



             code = 431)                                         

 

             EOSINOPHILS RELATIVE 0 %                                    



             PERCENT (BEAKER) (test                                        



             code = 432)                                         

 

             BASOPHILS RELATIVE 1 %                                    



             PERCENT (BEAKER) (test                                        



             code = 437)                                         

 

             NEUTROPHILS ABSOLUTE 4.25 K/ L    1.78-5.38                 



             COUNT (BEAKER) (test                                        



             code = 670)                                         

 

             LYMPHOCYTES ABSOLUTE 0.98 K/ L    1.32-3.57    L            



             COUNT (BEAKER) (test                                        



             code = 414)                                         

 

             MONOCYTES ABSOLUTE 0.79 K/ L    0.30-0.82                 



             COUNT (BEAKER) (test                                        



             code = 415)                                         

 

             EOSINOPHILS ABSOLUTE 0.00 K/ L    0.04-0.54    L            



             COUNT (BEAKER) (test                                        



             code = 416)                                         

 

             BASOPHILS ABSOLUTE 0.04 K/ L    0.01-0.08                 



             COUNT (BEAKER) (test                                        



             code = 417)                                         

 

             IMMATURE     1 %          0-1                       



             GRANULOCYTES-RELATIVE                                        



             PERCENT (BEAKER) (test                                        



             code = 2801)                                        



PROTHROMBIN [id number] 10:50:00





             Test Item    Value        Reference Range Interpretation Comments

 

             PROTIME (BEAKER) (test code = 17.5 seconds 11.7-14.7    H          

  



             759)                                                

 

             INR (BEAKER) (test code = 370) 1.4          <=5.9                  

   



RECOMMENDED COUMADIN/WARFARIN INR THERAPY RANGESSTANDARD DOSE: 2.0 - 3.0 
Includes: PROPHYLAXIS for venous thrombosis, systemic embolization; TREATMENT 
for venous thrombosis and/or pulmonary embolus.HIGH RISK: Target INR is 2.5-3.5 
for patients with mechanical heart valves.

## 2022-08-22 NOTE — ER
Nurse's Notes                                                                                     

 [facility]                                                                 

Name: Emanuel Burch                                                                             

Age: 52 yrs                                                                                       

Sex: Male                                                                                         

: 1970                                                                                   

MRN: [id number]                                                                                   

Arrival Date: 2022                                                                          

Time: 09:23                                                                                       

Account#: [id number]                                                                            

Bed 8                                                                                             

Private MD:                                                                                       

Diagnosis: Hepatic Encephalopathy ;Altered mental status, unspecified;Acute and subacute hepatic  

  failure                                                                                         

                                                                                                  

Presentation:                                                                                     

                                                                                             

09:19 Chief complaint: EMS states: famly called EMS this am for increased AMS over the last   [facility] 

      two days, worse this am. Pt with Hx of Cirrhosis of the liver and has not been taking       

      home meds. Pt was last "tapped" two weeks ago per EMS and 4 liters were removed.            

      Coronavirus screen: Vaccine status: Client denies travel out of the [place] in the last 14     

      days. At this time, the client does not indicate any symptoms associated with               

      coronavirus-19. Ebola Screen: Patient negative for fever greater than or equal to 101.5     

      degrees Fahrenheit, and additional compatible Ebola Virus Disease symptoms Patient          

      denies exposure to infectious person. Patient denies travel to an Ebola-affected area       

      in the 21 days before illness onset. Initial Sepsis Screen: Does the patient meet any 2     

      criteria? Altered Mental Status. Does the patient have a suspected source of infection?     

      No. Patient's initial sepsis screen is negative. Risk Assessment: Do you want to hurt       

      yourself or someone else? Patient reports no desire to harm self or others.                 

09:19 Method Of Arrival: EMS: [facility]                                                [facility] 

09:19 Onset of symptoms was 2022.                                                  [facility] 

09:19 Acuity: CHERYL 2                                                                           [facility] 

                                                                                                  

Triage Assessment:                                                                                

09:19 General: Appears uncomfortable, ill, unkempt, Behavior is restless. Pain: Denies pain.  [facility] 

      Neuro: Level of Consciousness is awake, confused, Oriented to person,  are equal       

      bilaterally Speech is normal, Pupils are PERRLA. Cardiovascular: No deficits noted.         

      Capillary refill < 3 seconds Clubbing of nail beds is absent Patient's skin is warm and     

      dry. GI: Abdomen is distended, noted to have ascites, Bowel sounds present X 4 quads.       

                                                                                                  

- Family history:: not pertinent.                                                                 

- Hospitalizations: : No recent hospitalization is reported.                                      

                                                                                                  

                                                                                                  

Screening:                                                                                        

10:03 Abuse screen: Denies threats or abuse. Nutritional screening: No deficits noted.        jh6 

      Tuberculosis screening: No symptoms or risk factors identified. Fall Risk Secondary         

      diagnosis (15 points) impaired mobility, IV access (20 points). Mental Status-              

      Overestimates/Forgets Limitations (15 pts.).                                                

                                                                                                  

Assessment:                                                                                       

09:25 General: see triage note. .                                                             jh6 

10:30 Reassessment: No changes from previously documented assessment. Patient and/or family   jh6 

      updated on plan of care and expected duration. Pain level reassessed. Patient is alert,     

      oriented x 3, equal unlabored respirations, skin warm/dry/pink. pt alert to self only.      

12:00 Reassessment: Patient and/or family updated on plan of care and expected duration. Pain jh6 

      level reassessed. pt taken to CT via stretcher. restless at times nd when asked states      

      arm and back hurt.                                                                          

13:00 Reassessment: Patient and/or family updated on plan of care and expected duration. Pain jh6 

      level reassessed. family at bedside, aware of results and the need for admission and        

      further meds. pt at times is lucid and others alert to self only. no further vomiting       

      episodes and was able to keep down po meds.                                                 

14:00 Reassessment: No changes from previously documented assessment.                         jh6 

15:00 Reassessment: No changes from previously documented assessment. Patient and/or family   jh6 

      updated on plan of care and expected duration. Pain level reassessed.                       

                                                                                                  

Vital Signs:                                                                                      

09:19  / 82; Pulse 80; Resp 17; Temp 97.6(TE); Pulse Ox 96% on 2 lpm NC; Weight 84.37   jh6 

      kg; Height 5 ft. 8 in. (172.72 cm); Pain 0/10;                                              

10:00  / 85; Pulse 108; Resp 15; Pulse Ox 97% on 2 lpm NC; Pain 2/10;                   jh6 

12:00  / 74; Pulse 110; Resp 20; Pulse Ox 97% on 2 lpm NC; Pain 0/10;                   jh6 

13:00  / 72; Pulse 111; Resp 18; Pulse Ox 98% on 2 lpm NC; Pain 0/10;                   jh6 

14:00  / 81; Pulse 106; Resp 18; Pulse Ox 97% on 2 lpm NC; Pain 3/10;                   jh6 

15:00  / 78; Pulse 105; Resp 20; Pulse Ox 98% on 2 lpm NC; Pain 0/10;                   6 

09:19 Body Mass Index 28.28 (84.37 kg, 172.72 cm)                                             6 

                                                                                                  

ED Course:                                                                                        

09:19 Arm band placed on right wrist. Patient placed in the treatment room, on a stretcher,   jh6 

      on oxygen, on cardiac monitor, on pulse oximetry.                                           

09:20 Inserted saline lock: 20 gauge in left antecubital area, using aseptic technique. Blood 6 

      collected.                                                                                  

09:20 Maintain EMS IV. Dressing intact. Good blood return noted. Site clean \T\ dry. Gauge \T\    
6

      site: 18g rt ac.                                                                            

09:20 Oxygen administration via nasal cannula \T\ 3L/min.                                       6 

09:23 Patient arrived in ED.                                                                  rn  

09:23 Gigi Raymond MD is Attending Physician.                                                rn  

09:25 Placed in gown. Bed in low position. Call light in reach. Side rails up X2. Adult w/    jh6 

      patient.                                                                                    

09:25 Initial lab(s) drawn, by me, sent to lab. First set of blood cultures drawn Second set  jh 

      of blood cultures drawn by me, EKG done, by ED staff, COVID swab sent to lab. X-ray(s)      

      taken.                                                                                      

09:55 Florence Holm, RN is Primary Nurse.                                                 6 

09:59 Triage completed.                                                                       jh6 

10:00 Patient moved to CT via stretcher.                                                      6 

10:06 CT Head Brain wo Cont In Process Unspecified.                                           EDMS

10:33 Adiel Raymond MD is Hospitalizing Provider.                                           rn  

12:41 CT Abd/Pelvis - IV Contrast Only In Process Unspecified.                                EDMS

13:15 initiated transfer to [facility].                                              bd  

15:12 pt accepted in transfer to [facility] by dr Nava admit approval given by   radha Friedman.                                                                            

16:40 Patient transferred, IV remains in place.                                               [facility] 

16:40 No provider procedures requiring assistance completed.                                  [facility] 

                                                                                                  

Administered Medications:                                                                         

10:18 Drug: Zofran (Ondansetron) 4 mg Route: IVP; Site: right antecubital;                    6 

11:22 Drug: Lactulose 20 grams Volume: 30 ml; Route: PO;                                      6 

14:08 Drug: Rocephin (cefTRIAXone) 1 grams Route: IV; Rate: calculated rate; Site: right      jh6 

      antecubital;                                                                                

                                                                                                  

                                                                                                  

Medication:                                                                                       

16:40 VIS not applicable for this client.                                                     6 

                                                                                                  

Outcome:                                                                                          

10:34 Decision to Hospitalize by Provider.                                                    rn  

13:53 ER care complete, transfer ordered by MD.                                               rn  

16:39 Transferred by ground EMS to [facility], Transfer form completed.        [facility] 

16:39 Condition: stable                                                                           

16:39 Instructed on the need for admit.                                                           

16:41 Patient left the ED.                                                                    [facility] 

                                                                                                  

Signatures:                                                                                       

Dispatcher [employer]                           EDMS                                                 

Dory Barbosa Roman, MD MD rn Hastedt, Jennifer, RN RN   [facility]                                                  

                                                                                                  

**************************************************************************************************

## 2022-08-22 NOTE — RAD REPORT
EXAM DESCRIPTION:  CT - Abdomen   Pelvis W Contrast - 8/22/2022 12:39 pm

 

CLINICAL HISTORY:  Abdominal pain

 

COMPARISON:  2016

 

TECHNIQUE:  Computed axial tomography of the abdomen pelvis was obtained. 100 cc Isovue-300 was admin
istered intravenously. Oral contrast was not requested which limits evaluation of bowel and appendix

 

All CT scans are performed using dose optimization technique as appropriate and may include automated
 exposure control or mA/KV adjustment according to patient size.

 

FINDINGS:  Cirrhotic liver. Portacaval shunt.

 

Cholelithiasis without significant gallbladder wall thickening.

 

Spleen upper limits normal size.

 

Pancreas, adrenals and kidneys are unremarkable.

 

No evidence diverticulitis.

 

Large amount of ascites within the abdomen pelvis.

 

There is no evidence of diverticulitis.

 

IMPRESSION:  Cirrhosis with large amount of ascites

## 2022-08-23 NOTE — EKG
Test Date:    2022-08-22               Test Time:    10:11:15

Technician:   DIEGO                                    

                                                     

MEASUREMENT RESULTS:                                       

Intervals:                                           

Rate:         106                                    

FL:           142                                    

QRSD:         64                                     

QT:           376                                    

QTc:          499                                    

Axis:                                                

P:            77                                     

FL:           142                                    

QRS:          42                                     

T:            43                                     

                                                     

INTERPRETIVE STATEMENTS:                                       

                                                     

Sinus tachycardia

Nonspecific ST and T wave abnormality

Abnormal ECG

Compared to ECG 08/02/2022 09:10:53

ST (T wave) deviation now present

T-wave abnormality no longer present



Electronically Signed On 08-23-22 13:49:59 CDT by Artemio Aggarwal